# Patient Record
Sex: FEMALE | Race: WHITE | NOT HISPANIC OR LATINO | Employment: UNEMPLOYED | URBAN - METROPOLITAN AREA
[De-identification: names, ages, dates, MRNs, and addresses within clinical notes are randomized per-mention and may not be internally consistent; named-entity substitution may affect disease eponyms.]

---

## 2017-01-24 ENCOUNTER — HOSPITAL ENCOUNTER (OUTPATIENT)
Dept: BONE DENSITY | Facility: CLINIC | Age: 57
End: 2017-01-24
Attending: PHYSICIAN ASSISTANT
Payer: COMMERCIAL

## 2017-01-24 ENCOUNTER — HOSPITAL ENCOUNTER (OUTPATIENT)
Dept: MAMMOGRAPHY | Facility: CLINIC | Age: 57
Discharge: HOME | End: 2017-01-24
Attending: INTERNAL MEDICINE | Admitting: INTERNAL MEDICINE
Payer: COMMERCIAL

## 2017-01-24 DIAGNOSIS — Z12.31 VISIT FOR SCREENING MAMMOGRAM: ICD-10-CM

## 2017-01-24 DIAGNOSIS — N95.1 MENOPAUSAL STATE: ICD-10-CM

## 2017-01-24 PROCEDURE — G0202 SCR MAMMO BI INCL CAD: HCPCS

## 2017-01-24 PROCEDURE — 77080 DXA BONE DENSITY AXIAL: CPT

## 2017-03-02 ENCOUNTER — HOSPITAL ENCOUNTER (EMERGENCY)
Facility: CLINIC | Age: 57
Discharge: HOME | End: 2017-03-02
Attending: EMERGENCY MEDICINE | Admitting: EMERGENCY MEDICINE
Payer: OTHER MISCELLANEOUS

## 2017-03-02 VITALS
TEMPERATURE: 97.8 F | HEART RATE: 65 BPM | DIASTOLIC BLOOD PRESSURE: 91 MMHG | RESPIRATION RATE: 15 BRPM | SYSTOLIC BLOOD PRESSURE: 179 MMHG | WEIGHT: 155 LBS | OXYGEN SATURATION: 99 %

## 2017-03-02 DIAGNOSIS — S61.412A HAND LACERATION, LEFT, INITIAL ENCOUNTER: ICD-10-CM

## 2017-03-02 PROCEDURE — 12001 RPR S/N/AX/GEN/TRNK 2.5CM/<: CPT

## 2017-03-02 PROCEDURE — 99283 EMERGENCY DEPT VISIT LOW MDM: CPT

## 2017-03-02 ASSESSMENT — ENCOUNTER SYMPTOMS: WOUND: 1

## 2017-03-02 NOTE — ED AVS SNAPSHOT
Emergency Department    [address] [facility] [address]    Phone:  [phone]    Fax:  [phone]                                       Nancy Montemayor   MRN: [id number]    Department:   Emergency Department   Date of Visit:  3/2/2017           Patient Information     Date Of Birth          1960        Your diagnoses for this visit were:     Hand laceration, left, initial encounter        You were seen by Dorothy Hale MD.      Follow-up Information     Schedule an appointment as soon as possible for a visit with Anisha Cosme MD, MD.    Specialty:  Internal Medicine    Why:  For suture removal    Contact information:    [facility]  [address] [facility] [address] [id number] [phone]          Follow up with  Emergency Department.    Specialty:  EMERGENCY MEDICINE    Why:  If symptoms worsen    Contact information:    [address] [id number] [phone]        Discharge Instructions       Follow up with primary care provider for suture removal  Keep laceration dry for first 24 hours, then can get it wet but don't soak it  Every day, apply bacitracin and new dressing while at work  At night, can keep it open to air    Discharge Instructions  Laceration (Cut)    You were seen today for a laceration (cut).  Your doctor examined your laceration for any problems such a buried foreign body (like glass, a splinter, or gravel), or injury to blood vessels, tendons, and nerves.  Your doctor may have also rinsed and/or scrubbed your laceration to help prevent an infection.  Your laceration may have been closed with glue, staples or sutures (stitches).      It may not be possible to find all problems with your laceration on the first visit, and we can't always prevent infections.  Antibiotics are only given when the benefit is more than the risk, and don't prevent all infections. Some lacerations are too high risk to close, and are left  open to heal.  All lacerations, no matter how expertly repaired, will cause scarring.    Return to the Emergency Department right away if:    You have more redness, swelling, pain, drainage (pus), a bad smell, or red streaking from your laceration.      You have a fever of 101oF or more.    You have bleeding that you can t stop at home. If your cut starts to bleed, hold pressure on the bleeding area with a clean cloth or put pressure over the bandage.  If the bleeding doesn t stop after using constant pressure for 30 minutes, you should return to the Emergency Department for further treatment.    An area past the laceration is cool, pale, or blue compared with the other side, or has a slower return of color when squeezed.    Your dressing seems too tight or starts to get uncomfortable or painful.    You have loss of normal function or use of an area, such as being unable to straighten or bend a finger normally.    You have a numb area past the laceration.    Return to the Emergency Department or see your regular doctor if:    The laceration starts to come open.     You have something coming out of the cut or a feeling that there is something in the laceration.    Your wound will not heal, or keeps breaking open. There can always be glass, wood, dirt or other things in any wound.  They won t always show up, even on x-rays.  If a wound doesn t heal, this may be why, and it is important to follow-up with your regular doctor.    Home Care:    Take your dressing off in 12 hours, or as instructed by your doctor, to check your laceration. Remove the dressing sooner if it seems too tight or painful, or if it is getting numb, tingly, or pale past the dressing.    Gently wash your laceration 2 times a day with clean cloth and soap.     It is okay to shower, but do not let the laceration soak in water.      If your laceration was closed with wound adhesive or strips: pat it dry and leave it open to the air.     For all other  "repairs: after you wash your laceration, or at least 2 times a day, apply bacitracin or other antibiotic ointment to the laceration, then cover it with a Band-Aid  or gauze.    Keep the laceration clean. Wear gloves or other protective clothing if you are around dirt.    Follow-up:    You need to follow-up with your regular doctor in 7-10 days.    Your sutures or staples need to be removed in 7-10 days. Schedule an appointment with your regular doctor to have this done.    Scars:  To help minimize scarring:    Wear sunscreen over the healed laceration when out in the sun.    Massage the area regularly.    You may use Vitamin E oil.    Wait a year.  Most scars will start to fade within a year.    Probiotics: If you have been given an antibiotic, you may want to also take a probiotic pill or eat yogurt with live cultures. Probiotics have \"good bacteria\" to help your intestines stay healthy. Studies have shown that probiotics help prevent diarrhea and other intestine problems (including C. diff infection) when you take antibiotics. You can buy these without a prescription in the pharmacy section of the store.     If you were given a prescription for medicine here today, be sure to read all of the information (including the package insert) that comes with your prescription.  This will include important information about the medicine, its side effects, and any warnings that you need to know about.  The pharmacist who fills the prescription can provide more information and answer questions you may have about the medicine.  If you have questions or concerns that the pharmacist cannot address, please call or return to the Emergency Department.     Remember that you can always come back to the Emergency Department if you are not able to see your regular doctor in the amount of time listed above, if you get any new symptoms, or if there is anything that worries you.          24 Hour Appointment Hotline       To make an " appointment at any [facility] clinic, call [phone] ([phone]). If you don't have a family doctor or clinic, we will help you find one. [facility] clinics are conveniently located to serve the needs of you and your family.             Review of your medicines      Notice     You have not been prescribed any medications.            Orders Needing Specimen Collection     None      Pending Results     No orders found from 2/28/2017 to 3/3/2017.            Pending Culture Results     No orders found from 2/28/2017 to 3/3/2017.             Test Results from your hospital stay            Clinical Quality Measure: Blood Pressure Screening     Your blood pressure was checked while you were in the emergency department today. The last reading we obtained was  BP: (!) 179/91 . Please read the guidelines below about what these numbers mean and what you should do about them.  If your systolic blood pressure (the top number) is less than 120 and your diastolic blood pressure (the bottom number) is less than 80, then your blood pressure is normal. There is nothing more that you need to do about it.  If your systolic blood pressure (the top number) is 120-139 or your diastolic blood pressure (the bottom number) is 80-89, your blood pressure may be higher than it should be. You should have your blood pressure rechecked within a year by a primary care provider.  If your systolic blood pressure (the top number) is 140 or greater or your diastolic blood pressure (the bottom number) is 90 or greater, you may have high blood pressure. High blood pressure is treatable, but if left untreated over time it can put you at risk for heart attack, stroke, or kidney failure. You should have your blood pressure rechecked by a primary care provider within the next 4 weeks.  If your provider in the emergency department today gave you specific instructions to follow-up with your doctor or provider even sooner than that, you should follow that  "instruction and not wait for up to 4 weeks for your follow-up visit.        Thank you for choosing [facility]       Thank you for choosing [facility] for your care. Our goal is always to provide you with excellent care. Hearing back from our patients is one way we can continue to improve our services. Please take a few minutes to complete the written survey that you may receive in the mail after you visit with us. Thank you!        [facility] [employer] Information     [employer] lets you send messages to your doctor, view your test results, renew your prescriptions, schedule appointments and more. To sign up, go to [email or web address] . Click on \"Log in\" on the left side of the screen, which will take you to the Welcome page. Then click on \"Sign up Now\" on the right side of the page.     You will be asked to enter the access code listed below, as well as some personal information. Please follow the directions to create your username and password.     Your access code is: [email or web address]  Expires: 2017  1:18 PM     Your access code will  in 90 days. If you need help or a new code, please call your [facility] clinic or [phone].        Care EveryWhere ID     This is your Care EveryWhere ID. This could be used by other organizations to access your [facility] medical records  [id number]        After Visit Summary       This is your record. Keep this with you and show to your community pharmacist(s) and doctor(s) at your next visit.                  "

## 2017-03-02 NOTE — DISCHARGE INSTRUCTIONS
Follow up with primary care provider for suture removal  Keep laceration dry for first 24 hours, then can get it wet but don't soak it  Every day, apply bacitracin and new dressing while at work  At night, can keep it open to air    Discharge Instructions  Laceration (Cut)    You were seen today for a laceration (cut).  Your doctor examined your laceration for any problems such a buried foreign body (like glass, a splinter, or gravel), or injury to blood vessels, tendons, and nerves.  Your doctor may have also rinsed and/or scrubbed your laceration to help prevent an infection.  Your laceration may have been closed with glue, staples or sutures (stitches).      It may not be possible to find all problems with your laceration on the first visit, and we can't always prevent infections.  Antibiotics are only given when the benefit is more than the risk, and don't prevent all infections. Some lacerations are too high risk to close, and are left open to heal.  All lacerations, no matter how expertly repaired, will cause scarring.    Return to the Emergency Department right away if:    You have more redness, swelling, pain, drainage (pus), a bad smell, or red streaking from your laceration.      You have a fever of 101oF or more.    You have bleeding that you can t stop at home. If your cut starts to bleed, hold pressure on the bleeding area with a clean cloth or put pressure over the bandage.  If the bleeding doesn t stop after using constant pressure for 30 minutes, you should return to the Emergency Department for further treatment.    An area past the laceration is cool, pale, or blue compared with the other side, or has a slower return of color when squeezed.    Your dressing seems too tight or starts to get uncomfortable or painful.    You have loss of normal function or use of an area, such as being unable to straighten or bend a finger normally.    You have a numb area past the laceration.    Return to the  "Emergency Department or see your regular doctor if:    The laceration starts to come open.     You have something coming out of the cut or a feeling that there is something in the laceration.    Your wound will not heal, or keeps breaking open. There can always be glass, wood, dirt or other things in any wound.  They won t always show up, even on x-rays.  If a wound doesn t heal, this may be why, and it is important to follow-up with your regular doctor.    Home Care:    Take your dressing off in 12 hours, or as instructed by your doctor, to check your laceration. Remove the dressing sooner if it seems too tight or painful, or if it is getting numb, tingly, or pale past the dressing.    Gently wash your laceration 2 times a day with clean cloth and soap.     It is okay to shower, but do not let the laceration soak in water.      If your laceration was closed with wound adhesive or strips: pat it dry and leave it open to the air.     For all other repairs: after you wash your laceration, or at least 2 times a day, apply bacitracin or other antibiotic ointment to the laceration, then cover it with a Band-Aid  or gauze.    Keep the laceration clean. Wear gloves or other protective clothing if you are around dirt.    Follow-up:    You need to follow-up with your regular doctor in 7-10 days.    Your sutures or staples need to be removed in 7-10 days. Schedule an appointment with your regular doctor to have this done.    Scars:  To help minimize scarring:    Wear sunscreen over the healed laceration when out in the sun.    Massage the area regularly.    You may use Vitamin E oil.    Wait a year.  Most scars will start to fade within a year.    Probiotics: If you have been given an antibiotic, you may want to also take a probiotic pill or eat yogurt with live cultures. Probiotics have \"good bacteria\" to help your intestines stay healthy. Studies have shown that probiotics help prevent diarrhea and other intestine problems " (including C. diff infection) when you take antibiotics. You can buy these without a prescription in the pharmacy section of the store.     If you were given a prescription for medicine here today, be sure to read all of the information (including the package insert) that comes with your prescription.  This will include important information about the medicine, its side effects, and any warnings that you need to know about.  The pharmacist who fills the prescription can provide more information and answer questions you may have about the medicine.  If you have questions or concerns that the pharmacist cannot address, please call or return to the Emergency Department.     Remember that you can always come back to the Emergency Department if you are not able to see your regular doctor in the amount of time listed above, if you get any new symptoms, or if there is anything that worries you.

## 2017-03-02 NOTE — ED AVS SNAPSHOT
Emergency Department    [address] [place] [address]    Phone:  [phone]    Fax:  [phone]                                       Nancy Montemayor   MRN: [id number]    Department:   Emergency Department   Date of Visit:  3/2/2017           After Visit Summary Signature Page     I have received my discharge instructions, and my questions have been answered. I have discussed any challenges I see with this plan with the nurse or doctor.    ..........................................................................................................................................  Patient/Patient Representative Signature      ..........................................................................................................................................  Patient Representative Print Name and Relationship to Patient    ..................................................               ................................................  Date                                            Time    ..........................................................................................................................................  Reviewed by Signature/Title    ...................................................              ..............................................  Date                                                            Time

## 2017-03-02 NOTE — ED PROVIDER NOTES
History     Chief Complaint:  Laceration     HPI   Nancy Montemayor is a 56 year old female who presents with a laceration on the palm of her left hand. The patient cut her hand today on a wooden knife block while she was working at [employer]. Of note, the patient is right handed. Tetanus up to date within last 5 years. No numbness/tingling.    Allergies:  The patient has no known drug allergies.    Medications:    The patient is currently on no regular medications.      Past Medical History:    Depressive disorder  Migraines    Past Surgical History:    History reviewed.  No significant past surgical history.     Family History:    History reviewed.  No significant family history.     Social History:  Relationship status:   Tobacco use: Former smoker  Alcohol use: Pos  The patient presents with a friend.     Review of Systems   Skin: Positive for wound.   All other systems reviewed and are negative.    Physical Exam   First Vitals:  BP: (!) 179/91  Pulse: 65  Heart Rate: 65  Temp: 97.8  F (36.6  C)  Resp: 15  Weight: 70.3 kg (155 lb)  SpO2: 99 %    Physical Exam    General: Resting comfortably on the gurney  Eyes:  The pupils are equal and round  ENT:    Atraumatic  Neck:  Normal range of motion  CV:  Regular rate and rhythm    Skin warm and well perfused     Radial pulse 2+ bilaterally     Capillary refill <2 seconds on left hand  Resp:  Non labored breathing  MS:  Normal muscular tone    Moving left hand and left wrist in full ROM  Skin:  2 cm laceration on left palm, minimal bleeding, no visualized vessel, tendon, nerve injury. Superficial  Neuro:   Awake, alert.      Speech is normal and fluent.    Face is symmetric.     Moves all extremities equally    Normal ROM and strength on left hand/wrist    SILT on median/ulnar/radial nerve distribution on left hand  Psych:  Normal affect.  Appropriate interactions.    Emergency Department Course   Procedures:    Narrative: Procedure: Laceration Repair         LACERATION:  A simple clean 2 cm laceration.      LOCATION:  Left palm      FUNCTION:  Distally sensation, circulation, motor and tendon function are intact.      ANESTHESIA:  Local using 0.5% Marcaine total of 4 mLs      PREPARATION:  Irrigation with Normal Saline      DEBRIDEMENT:  no debridement      CLOSURE:  Wound was closed with One Layer.  Skin closed with 3 x 4.0 Ethylon using interrupted sutures.    Emergency Department Course:  Nursing notes and vitals reviewed.  I performed an exam of the patient as documented above.  The above workup was undertaken.  1245: I conducted the procedure as documented above.   1251: I rechecked the patient and discussed results.    Findings and plan explained to the Patient. Patient discharged home, status improved, with instructions regarding supportive care, medications, and reasons to return as well as the importance of close follow-up was reviewed.      Impression & Plan    Medical Decision Making:  Nancy Montemayor is a 56 year old female who presents for evaluation of a laceration to the left palm.  The wound was carefully evaluated and explored.  The laceration was closed with stitches as noted above.  There is no evidence of muscular, tendon, or bony damage with this laceration.  No signs of foreign body.  Possible complications (infection, scarring) were reviewed with the patient.  Follow up with primary care as noted in the discharge section for suture removal in 7-10 days.    Diagnosis:    ICD-10-CM   1. Hand laceration, left, initial encounter S61.412A     Disposition:  discharged to home    Bennett PALOMARES am serving as a scribe on 3/2/2017 at 12:36 PM to personally document services performed by Dorothy Hale MD, based on my observations and the provider's statements to me.   EMERGENCY DEPARTMENT       Dorothy Hale MD  03/02/17 [id number]

## 2018-02-02 ENCOUNTER — HOSPITAL ENCOUNTER (OUTPATIENT)
Dept: MAMMOGRAPHY | Facility: CLINIC | Age: 58
Discharge: HOME | End: 2018-02-02
Attending: INTERNAL MEDICINE | Admitting: INTERNAL MEDICINE
Payer: COMMERCIAL

## 2018-02-02 DIAGNOSIS — Z12.31 VISIT FOR SCREENING MAMMOGRAM: ICD-10-CM

## 2018-02-02 PROCEDURE — 77067 SCR MAMMO BI INCL CAD: CPT

## 2023-03-14 ENCOUNTER — HOSPITAL ENCOUNTER (EMERGENCY)
Facility: CLINIC | Age: 63
Discharge: HOME | End: 2023-03-15
Attending: EMERGENCY MEDICINE | Admitting: EMERGENCY MEDICINE
Payer: COMMERCIAL

## 2023-03-14 DIAGNOSIS — F12.90 MARIJUANA USE: ICD-10-CM

## 2023-03-14 DIAGNOSIS — F33.9 EPISODE OF RECURRENT MAJOR DEPRESSIVE DISORDER, UNSPECIFIED DEPRESSION EPISODE SEVERITY (H): ICD-10-CM

## 2023-03-14 PROCEDURE — 99285 EMERGENCY DEPT VISIT HI MDM: CPT | Mod: 25 | Performed by: EMERGENCY MEDICINE

## 2023-03-14 PROCEDURE — 99285 EMERGENCY DEPT VISIT HI MDM: CPT | Performed by: EMERGENCY MEDICINE

## 2023-03-14 PROCEDURE — 90791 PSYCH DIAGNOSTIC EVALUATION: CPT

## 2023-03-14 RX ORDER — DULOXETIN HYDROCHLORIDE 30 MG/1
30 CAPSULE, DELAYED RELEASE ORAL DAILY
COMMUNITY
End: 2023-03-16 | Stop reason: DRUGHIGH

## 2023-03-14 ASSESSMENT — COLUMBIA-SUICIDE SEVERITY RATING SCALE - C-SSRS
4. HAVE YOU HAD THESE THOUGHTS AND HAD SOME INTENTION OF ACTING ON THEM?: NO
4. HAVE YOU HAD THESE THOUGHTS AND HAD SOME INTENTION OF ACTING ON THEM?: NO
2. HAVE YOU ACTUALLY HAD ANY THOUGHTS OF KILLING YOURSELF?: YES
TOTAL  NUMBER OF ABORTED OR SELF INTERRUPTED ATTEMPTS LIFETIME: NO
6. HAVE YOU EVER DONE ANYTHING, STARTED TO DO ANYTHING, OR PREPARED TO DO ANYTHING TO END YOUR LIFE?: NO
REASONS FOR IDEATION PAST MONTH: MOSTLY TO END OR STOP THE PAIN (YOU COULDN'T GO ON LIVING WITH THE PAIN OR HOW YOU WERE FEELING)
ATTEMPT LIFETIME: NO
1. HAVE YOU WISHED YOU WERE DEAD OR WISHED YOU COULD GO TO SLEEP AND NOT WAKE UP?: YES
2. HAVE YOU ACTUALLY HAD ANY THOUGHTS OF KILLING YOURSELF?: YES
TOTAL  NUMBER OF INTERRUPTED ATTEMPTS LIFETIME: NO
5. HAVE YOU STARTED TO WORK OUT OR WORKED OUT THE DETAILS OF HOW TO KILL YOURSELF? DO YOU INTEND TO CARRY OUT THIS PLAN?: NO
1. IN THE PAST MONTH, HAVE YOU WISHED YOU WERE DEAD OR WISHED YOU COULD GO TO SLEEP AND NOT WAKE UP?: YES
5. HAVE YOU STARTED TO WORK OUT OR WORKED OUT THE DETAILS OF HOW TO KILL YOURSELF? DO YOU INTEND TO CARRY OUT THIS PLAN?: NO
3. HAVE YOU BEEN THINKING ABOUT HOW YOU MIGHT KILL YOURSELF?: YES
REASONS FOR IDEATION LIFETIME: MOSTLY TO END OR STOP THE PAIN (YOU COULDN'T GO ON LIVING WITH THE PAIN OR HOW YOU WERE FEELING)

## 2023-03-14 ASSESSMENT — ACTIVITIES OF DAILY LIVING (ADL): ADLS_ACUITY_SCORE: 35

## 2023-03-15 ENCOUNTER — TELEPHONE (OUTPATIENT)
Dept: BEHAVIORAL HEALTH | Facility: CLINIC | Age: 63
End: 2023-03-15
Payer: COMMERCIAL

## 2023-03-15 VITALS
HEART RATE: 97 BPM | OXYGEN SATURATION: 95 % | WEIGHT: 177 LBS | SYSTOLIC BLOOD PRESSURE: 122 MMHG | DIASTOLIC BLOOD PRESSURE: 90 MMHG | TEMPERATURE: 97.2 F | RESPIRATION RATE: 16 BRPM

## 2023-03-15 NOTE — TELEPHONE ENCOUNTER
Writer spoke with patient and scheduled initial psychiatry appointment for 03/16/2023 @ 10:30 am with Chela Levy. Tracker completed.    Nasreen Galavizrera  03/15/2023  1141    ----- Message from Roberto Carlos Dominguez RN sent at 3/15/2023  8:31 AM CDT -----  Regarding: FW: Medication Management and Therapy   Mental Health &Addiction (MH&A)Transition Clinic (TC):     Provides Patient Support While Waiting to Access Programmatic and Outpatient MH&A Care and Provides Select Crisis Assessment Services     NURSING Referral Review  _________________________________________    This RN has reviewed this Medication Management referral to the Transition Clinic and deemed the referral    Appropriate x   Inappropriate   Consulting     Based on the following criteria:    Pt has a psychiatric provider (or pending plan) in place for future prescribing: Yes:   To: Home with Med Management   Provider(s) Ameena Romero with [facility]   Location [address] [place] [address], [address]   Date/Time 3/22 @ 10am      Timeframe until pt's scheduled psychiatry appointment is less than 6 months: Yes: 7 days     Pt takes psychiatric medications: Yes:   DULoxetine (CYMBALTA) 30 MG capsule Take 30 mg by mouth daily      Pt's goals seem to align with this temporary service: Yes: Transition Clinic to bridge psychiatric care and psychiatric medication management until next Level of Care.         Any additional pertinent information regarding this referral: Patient was seen in the ED on 3/14/23 to 3/15/23. Per ED HPI. Nancy Montemayor is a 62 year old female with hx of fibromyalgia, hypertension, depression who presents with her sister for mental health evaluation. The patient was with friends tonight and she talked about thinking about suicide every day which has been going on for quite awhile.  They felt she should come for evaluation. She says she has been depressed for a long time and had suicidal thoughts for a long time but it seems to have worsened the  past 2 months.  She doesn't like her job. 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  Her parents are elderly and were both in the hospital with influenza in February. Her mother was just placed in a nursing home.  She lives alone. Her adult children live in [place]. She does not talk to her daughter but talks to her son once a week. She hasn't seen him for about 1 year.  She uses thc gummies sometimes and smokes thc about once a day.  She is on cymbalta 60 mg dose for a long period of time via her pmd.  She does not have a therapist or mental health provider. No prior mental health admissions.  She doesn't think she would ever kill herself because of her parents.  She has thought about ways of doing it but denies plan or intent. She finds herself sleeping a lot.  Saw pmd on 3/9/23 and they placed therapy and psychiatry referrals.     Initial contact w/ patient/parent: Transition Clinic RN called and spoke with Nancy JOJO Montemayor who stated that she was interested in Transition Clinic bridging service until her next level of care appointment with Ameena Romero MD. TC Coordinator to contact this patient/patients guardian to schedule a New Person Visit with TC Provider Chela Levy.       Additional Scheduling Instructions for Transition Clinic Coordinator:   TC Coordinators:  This is a Medication management and Therapy  Referral.        Please call the patient at [phone] . Please schedule a NewPerson appointment with TC Provider Chela Levy as soon as possible or as indicated by the patient.       TC RN informe this (patient) as to the purpose and benefit of the TC.      The Transition Clinic is a Temporary Service that helps to bridge the time to your next appointment.  It is not intended to be a long-term service and you are expected to attend your scheduled appointment with your next provider.      Patient/Parent/ Facility Staff Member verbalized understanding x    If you need support  between appointments, please call [phone] and let them know you're seen by [facility] Psychiatry.  You may also send a [facility] message to reach us.        RN Signature Roberto Carlos Dominguez RN on 3/15/2023 at 8:17 AM         FW: Medication Management and Therapy  Received: Today  Nasreen De La Fuente [facility] Rn Mateusz Fraser,     Medication-     Next Level of Care Patient Will Be Transitioned To: Home with Med Management   Provider(s)Ameena Romero with [facility]   Location [address] [place] [address], [address]   Date/Time 3/22 @ 10am       Thank you!         Previous Messages       ----- Message -----   From: Kushal Carreno   Sent: 3/15/2023  12:43 AM CDT   To: Transition [facility]   Subject: Medication Management and Therapy                 Transition Clinic [place]   [place]         Please Check Type of Referral Requested:       __x__THERAPY: The Transition clinic is able to schedule patients without current medical insurance; these patient will be referred to our Social Work Care Coordinator for Medical Insurance              Assistance. We are open for referral for psychotherapy. Patient is referred from:  [facility]       __x__MEDICATION:  Referrals for Medication are ONLY accepted from the following areas (select): Emergency Department/Urgent Care - HIGH PRIORITY                                       Suboxone and Opioid Management Referrals are automatically denied. TC Psychiatry cannot see patient without active medical insurance.   TC Psychiatry cannot accept patient with next level of care scheduled with PCP       Referring Provider Contact Name: Torri Metcalf; Phone Number: [phone]     Reason for Transition Clinic Referral: Medication Management and Therapy. Patient is very worried about not having appointments set up until next week.     Next Level of Care Patient Will Be Transitioned To: Home with Therapy   Provider(s)Luis A Garcia with [facility]    Location [address]   Date/Time 3/21 @ 10am     Next Level of Care Patient Will Be Transitioned To: Home with Med Management   Provider(s)Ameena Romero with [facility]   Location [address], [address]   Date/Time 3/22 @ 10am     What Would Be Helpful from the Transition Clinic: Medication Management and Therapy      Needs: NO     Does Patient Have Access to Technology: Yes     Patient E-mail Address: [email or web address]     Current Patient Phone Number: [phone]     Clinician Gender Preference (if applicable): NO     Patient location preference: [place]                        ----- Message -----  From: Nasreen De La Fuente  Sent: 3/15/2023   7:19 AM CDT  To: [facility] Magdalena Child  Subject: FW: Medication Management and Therapy            Hello,    Medication-    Next Level of Care Patient Will Be Transitioned To: Home with Med Management   Provider(s)Ameena smith [facility]   Location [address], [address]   Date/Time 3/22 @ 10am       Thank you!  ----- Message -----  From: Kushal Carreno  Sent: 3/15/2023  12:43 AM CDT  To: [facility]  Subject: Medication Management and Therapy                Transition Clinic Referral   [place]         Please Check Type of Referral Requested:       __x__THERAPY: The Transition clinic is able to schedule patients without current medical insurance; these patient will be referred to our Social Work Care Coordinator for Medical Insurance              Assistance. We are open for referral for psychotherapy. Patient is referred from:  [facility]      __x__MEDICATION:  Referrals for Medication are ONLY accepted from the following areas (select): Emergency Department/Urgent Care - HIGH PRIORITY                                       Suboxone and Opioid Management Referrals are automatically denied. TC Psychiatry cannot see patient without active medical insurance.   TC Psychiatry  cannot accept patient with next level of care scheduled with PCP      Referring Provider Contact Name: Torri Metcalf; Phone Number: [phone]    Reason for Transition Clinic Referral: Medication Management and Therapy. Patient is very worried about not having appointments set up until next week.     Next Level of Care Patient Will Be Transitioned To: Home with Therapy  Provider(s)Luis A Garcia with [facility]  Location [address]  Date/Time 3/21 @ 10am    Next Level of Care Patient Will Be Transitioned To: Home with Med Management  Provider(s)Ameena Romero with [facility]  Location [address] , [address]  Date/Time 3/22 @ 10am    What Would Be Helpful from the Transition Clinic: Medication Management and Therapy     Needs: NO    Does Patient Have Access to Technology: Yes    Patient E-mail Address: [email or web address]    Current Patient Phone Number: [phone]    Clinician Gender Preference (if applicable): NO    Patient location preference: [place]

## 2023-03-15 NOTE — CONSULTS
Diagnostic Evaluation Consultation  Crisis Assessment    Patient was assessed: In Person  Patient location: [facility] ED  Was a release of information signed: Yes. Providers included on the release: [facility] and [facility]      Referral Data and Chief Complaint  Nancy is a 62 year old, who uses she/her pronouns, and presents to the ED with family/friends. Patient is referred to the ED by family/friends. Patient is presenting to the ED for the following concerns: suicidal ideation.       Informed Consent and Assessment Methods     Patient is her own guardian. Writer met with patient and explained the crisis assessment process, including applicable information disclosures and limits to confidentiality, assessed understanding of the process, and obtained consent to proceed with the assessment. Patient was observed to be able to participate in the assessment as evidenced by being alert, oriented, and engaged. Assessment methods included conducting a formal interview with patient, review of medical records, collaboration with medical staff, and obtaining relevant collateral information from family and community providers when available.     Over the course of this crisis assessment provided reassurance, offered validation, engaged patient in problem solving and disposition planning and worked with patient on safety and aftercare planning. Patient's response to interventions was calm and cooperative.      Summary of Patient Situation     Patient presents to [facility] ED with her sister for evaluation of suicidal ideation. Pt has a hx of depression. The patient was with friends tonight and she talked about thinking about suicide every day which has been going on for quite awhile. She reports her friends encouraged her to come to the ED for evaluation. Pt confided in her sister as well who then accompanied her to the ED this evening. Pt endorses depression and SI for a long time, but this has worsened over the past 2  "months. Pt reports being very unhappy at her current job. She works at [employer] and starts her shift each day at 4 am. Reports it is very stressful and the hours are hard on her. She reports feeling as though she does not have the skill set to find a better job and states \"that is depressing in itself.\" Reports stress about her finances if she were to stop working. She reports sleeping excessively due to these hours, but also due to her depression. She reports withdrawing from others more, not responding as much to others and cancelling plans. She reports her parents are elderly and were both hospitalized with influenza recently. Her mother was recently placed in a nursing home. Her adult children live in [place]. She does not talk to her daughter but talks to her son once a week. She reports she only gets to see him once per year though over the holidays. She endorses using thc gummies occasional and smoking marijuana daily for the past 2 years. She reports she recently started drinking wine and has 1 glass per day. She has a PCP who prescribes her cymbalta 60 mg which she is compliant with. She recently asked for an increase, but her PCP told her that was the max she could do but offered to refer her to a psychiatrist. She does not have any other outpatient providers at this time. No hx of IP MH. Patient endorses SI, but denies plan or intent. States she does not believe she would ever follow through with this and is able to identify protective factors, including her parents. She denies HI, NSSI, hallucinations or delusions.     Brief Psychosocial History     Patient currently lives alone. She has two adult children who live in [place]. She reports she talks to her son once per week, but only sees him in-person once per year when he comes home for Thanksgiving. She has a daughter who does not talk to her. She reports she was  for 32 years and  her partner about 6 years ago and her " daughter was very upset about this and has not talked to her since. Pt currently works at [employer] and reports it is very stressful and takes a significant toll on her mental health. She is not happy with her job. She reports stress about finances if she were to quit though. She also feels that she does not have the skill set to find a different, better job. She is considering taking a leave of absence to focus on her mental kassi though. No relevant legal issues noted or reported.     Significant Clinical History    Patient has a hx of depression. She reports a hx of marijuana use, and she went to [facility] treatment at [facility] for this when she was 25 years old. She reports she started smoking marijuana again about two years ago daily and occasionally takes THC gummies. She reports a hx of seeing individual and marriage therapists in the past, but does not have one currently. No hx of MH day treatment programming. No hx of IP MH. No hx of commitment. Currently has a PCP who prescribes her cymbalta 60 mg, which she is compliant with. Denies any relevant trauma hx. No other providers at this time.      Collateral Information    Kavita Perry (sister) [phone] - present at the time of assessment. Supportive and able to engage in safety planning along with patient. Reports it was patient's friends that she opened up to and pt called her afterwards. Reports she is aware pt has struggled with her mental health in the past, especially during her marriage and divorce. Reports she was very unhappy in her marriage. Has offered to have patient stay with her for additional safety and support.      Risk Assessment    [place] Suicide Severity Rating Scale Full Clinical Version: moderate risk 3/14/23  Suicidal Ideation  1. Wish to be Dead (Lifetime): Yes  1. Wish to be Dead (Past 1 Month): Yes  2. Non-Specific Active Suicidal Thoughts (Lifetime): Yes  2. Non-Specific Active Suicidal Thoughts (Past 1 Month): Yes  3. Active  Suicidal Ideation with any Methods (Not Plan) Without Intent to Act (Lifetime): Yes  3. Active Suicidal Ideation with any Methods (Not Plan) Without Intent to Act (Past 1 Month): Yes  4. Active Suicidal Ideation with Some Intent to Act, Without Specific Plan (Lifetime): No  4. Active Suicidal Ideation with Some Intent to Act, Without Specific Plan (Past 1 Month): No  5. Active Suicidal Ideation with Specific Plan and Intent (Lifetime): No  5. Active Suicidal Ideation with Specific Plan and Intent (Past 1 Month): No  Intensity of Ideation  Most Severe Ideation Rating (Lifetime): 4  Most Severe Ideation Rating (Past 1 Month): 4  Frequency (Lifetime): Many times each day  Frequency (Past 1 Month): Many times each day  Duration (Lifetime): 1-4 hours/a lot of time  Duration (Past 1 Month): 1-4 hours/a lot of time  Controllability (Lifetime): Can control thoughts with little difficulty  Controllability (Past 1 Month): Can control thoughts with little difficulty  Deterrents (Lifetime): Deterrents definitely stopped you from attempting suicide  Deterrents (Past 1 Month): Deterrents definitely stopped you from attempting suicide  Reasons for Ideation (Lifetime): Mostly to end or stop the pain (You couldn't go on living with the pain or how you were feeling)  Reasons for Ideation (Past 1 Month): Mostly to end or stop the pain (You couldn't go on living with the pain or how you were feeling)  Suicidal Behavior  Actual Attempt (Lifetime): No  Has subject engaged in non-suicidal self-injurious behavior? (Lifetime): No  Interrupted Attempts (Lifetime): No  Aborted or Self-Interrupted Attempt (Lifetime): No  Preparatory Acts or Behavior (Lifetime): No  C-SSRS Risk (Lifetime/Recent)  Calculated C-SSRS Risk Score (Lifetime/Recent): Moderate Risk      Validity of evaluation is not impacted by presenting factors during interview.   Comments regarding subjective versus objective responses to [place] tool: none. See clinical narrative.    Environmental or Psychosocial Events: loss of relationship due to divorce/separation, challenging interpersonal relationships, helplessness/hopelessness, other life stressors and other: stressful job  Chronic Risk Factors: none identified  Warning Signs: talking or writing about death, dying, or suicide and withdrawing from friends, family, and society  Protective Factors: strong bond to family unit, community support, or employment, responsibilities and duties to others, including pets and children, lives in a responsibly safe and stable environment, supportive ongoing medical and mental health care relationships, help seeking and sense of belonging  Interpretation of Risk Scoring, Risk Mitigation Interventions and Safety Plan:  Pt is assessed to be of moderate risk of harm to herself based on the [place] screening. Pt endorses ongoing SI. She denies any plan or intent at this time though and reports she would never follow through with this and reports her parents as a protective factor. No hx of attempts. Able to engage in safety planning.     Does the patient have thoughts of harming others? No     Is the patient engaging in sexually inappropriate behavior?  no        Current Substance Abuse     Is there recent substance abuse? marijuana, THC edibles daily for past two years. 1 glass of wine per night.      Was a urine drug screen or blood alcohol level obtained: No       Mental Status Exam     Affect: Appropriate   Appearance: Appropriate    Attention Span/Concentration: Attentive  Eye Contact: Engaged   Fund of Knowledge: Appropriate    Language /Speech Content: Fluent   Language /Speech Volume: Normal    Language /Speech Rate/Productions: Normal    Recent Memory: Intact   Remote Memory: Intact   Mood: Normal    Orientation to Person: Yes    Orientation to Place: Yes   Orientation to Time of Day: Yes    Orientation to Date: Yes    Situation (Do they understand why they are here?): Yes    Psychomotor  Behavior: Normal    Thought Content: Clear   Thought Form: Intact      History of commitment: No    Medication    Psychotropic medications: Yes. Pt is currently taking cymbalta 60 mg. Medication compliant: Yes. Recent medication changes: No    Current Care Team    Primary Care Provider: Anisha Cosme MD - [facility], [facility]  [address]    [place] [id number]    Phone: [phone]    Fax: [phone]  Psychiatrist: No  Therapist: No  : No     CTSS or ARMHS: No  ACT Team: No  Other: No      Diagnosis    1 Major depressive disorder, Recurrent episode, Unspecified F33.9 - Primary, By History   Clinical Summary and Substantiation of Recommendations    After therapeutic assessment, intervention and aftercare planning by ED care team and LM and in consultation with attending provider, the patient's circumstances and mental state were appropriate for outpatient management. It is the recommendation of this clinician that pt discharge with OP MH support. A this time the pt is not presenting as an acute risk to self or others due to the following factors: pt endorses SI, but denies any plan or intent. Denies HI, NSSI, hallucinations or delusions. Pt is able to engage in safety planning and involved her sister, who is present at the time of assessment, in that safety plan. She plans to take a leave of absence from work to focus on her mental health. She is agreeable to recommendations for programmatic care, medication management and individual therapy. She feels safe discharging home to her sister's care with these appointments.    Disposition    Recommended disposition: Individual Therapy, Medication Management and Programmatic Care: Adult Day Treatment       Reviewed case and recommendations with attending provider. Attending Name: Dr. Sanchez       Attending concurs with disposition: Yes       Patient and/or validated legal guardian concurs with  disposition: Yes       Final disposition: Individual therapy , Medication management and Programmatic care: Adult Day Treatment.     Outpatient Details (if applicable):   Aftercare plan and appointments placed in the AVS and provided to patient: Yes. Given to patient by ED Nursing Staff    Assessment Details    Patient interview started at: 10:54 pm and completed at: 11:25 pm.     Total duration spent on the patient case in minutes: .50 hrs      CPT code(s) utilized: 20752 - Psychotherapy for Crisis - 60 (30-74*) min       JAYDEN Armendariz, Psychotherapist  [facility] - Triage & Transition Services  Callback: [phone]      Aftercare Plan     Mental Health Resources and Appointments     Intake Assessment - Virtual   A diagnostic assessment has been scheduled for the [facility] on the date of March 16th at 1:30pm.  This appointment will be conducted virtually. The [facility] has been given your contact information; They will provide you with appointment login information via e-mail and call you 15-30 minutes prior to your appointment to confirm you have the correct login information.      If you need to reschedule or cancel this appointment, please call Behavioral Access at [phone]. If you must cancel, please call at least 24 hours prior to your scheduled appointment. Please note that this assessment is needed to determine eligibility for Dialogic Programming. If you are determined to not be an appropriate fit for programming, please feel free to call [facility] at [phone] to speak with one of our coordinators on other programming options.      Type: Therapy - Initial (In-Person)  Date: Tuesday, 3/21/2023    Time: 10:00 am - 11:00 am  Provider: Luis A Garcia  Location: [facility], [facility] [place] [facility], [address] [place] [id number]  Phone: [phone]  This clinic will reach out to you and provide more information. If  you don't hear from them within 48 hours, please call the number listed above.      Type: Medication Mgmt - Initial (In-Person)  Date: Wednesday, 3/22/2023    Time: 10:00 am - 11:00 am  Provider: Ameena Romero MD, MD  Location: [facility], [address] , [address], [place] [address]  Phone: [phone]  Patient Instructions  THIS IS ONLY A RESERVATION. PATIENT MUST CALL [phone] TO PRE-REGISTER AND CONFIRM APPOINTMENT. Please arrive 15 min early with ID and insurance card. Insurance accepted: [place], PMAPs, commercial insurance. If pt has no insurance, we have a [facility] Navigator available to assist in applying for state insurance. We have 3 locations - intake appts available in [place] or [place], depending on day/time of week. Please call [phone] to verify location and pre-register to confirm appt.     Provider referred patient for Transition Clinic services through in basket message. The transition clinic will reach out directly to you.     If I am feeling unsafe or I am in a crisis, I will:   Contact my established care providers   Call the National Suicide Prevention Lifeline: 988  Go to the nearest emergency room   Call 911      Warning signs that I or other people might notice when a crisis is developing for me: increased thoughts or plans to harm myself or others, feeling unable to keep myself safe, symptoms do not improve or worsen     Things I am able to do on my own or with others to cope or help me feel better: listen to music, watch favorite movie or show, talk to friends or sister, yoga, meditation, coping skills listed below, go for a walk     Changes I can make to support my mental health and wellness: adhere to treatment recommendations, continue taking medications as prescribed, follow up with scheduled outpatient appointments, follow up with programmatic care      People in my life that I can ask for help: friends, sister, loved ones, outpatient providers - including  "newly scheduled providers above     Your county has a mental health crisis team you can call 24/7: [facility] Mobile Crisis  [phone]      Crisis Lines  Crisis Text Line  Text 053070  You will be connected with a trained live crisis counselor to provide support.     Por aylin, texto  EDUARDO a 099868 o texto a 442-AYUDAME en WhatsApp     The Jm Project (LGBTQ Youth Crisis Line)  4.606.565.9892  text START to 297-543        [employer]  Fast Tracker  Linking people to mental health and substance use disorder resources  [employer]      [place] Mental Health Warm Line  Peer to peer support  Monday thru Saturday, 12 pm to 10 pm  941.492.8108 or 9.222.717.1540  Text \"Support\" to 86183     National [place] on Mental Illness (KAN)  794.713.7892 or 1.888.KAN.HELPS        Mental Health Apps  My3  https://Cloud Content.org/     VirtualHopeBox  https://Hithru.org/apps/virtual-hope-box/        Additional Information  Today you were seen by a licensed mental health professional through Triage and Transition services, [facility] ([facility])  for a crisis assessment in the Emergency Department at [facility].  It is recommended that you follow up with your established providers (psychiatrist, mental health therapist, and/or primary care doctor - as relevant) as soon as possible. Coordinators from [facility] will be calling you in the next 24-48 hours to ensure that you have the resources you need.  You can also contact [facility] coordinators directly at [phone]. You may have been scheduled for or offered an appointment with a mental health provider. [facility] maintains an extensive network of licensed behavioral health providers to connect patients with the services they need.  We do not charge providers a fee to participate in our referral network.  We match patients with providers based on a patient's specific needs, insurance coverage, and location.  Our first effort will be " to refer you to a provider within your care system, and will utilize providers outside your care system as needed.       Grounding Techniques:    Try to notice where you are, your surroundings including the people, the sounds like the TV or radio.    Concentrate on your breathing. Take a deep cleansing breath from your diaphragm. Count the breaths as you exhale. Make sure you breath slowly.    Hold something that you find comforting, for some it may be a stuffed animal or a blanket. Notice how it feels in your hands. Is it hard or soft?    During a non-crisis time make a list of positive affirmations. Print them out and keep them handy for times of intense anxiety. At those times, read them aloud.  Try the Orchard Platform game:    Name 5 things you can see in the room with you    Name 4 things you can feel ( chair on my back  or  feet on floor )     Name 3 things you can hear right now ( people talking  or  tv )     Name 2 things you can smell right now (or, 2 things you like the smell of)     Name 1 good thing about yourself  Create A Safe Place    Image a safe place -- it can be a real or imaginary place:     What do you see -- especially colors?     What sounds do you hear?     What sensations do you feel?     What smells do you smell?     What people or animals would you want in your safe place?     Imagine a protective bubble, wall or boundary around your safe place.     Imagine a door or gate with a guard at your safe place.     Image a lock and key to your safe place and only you can unlock it.    You can draw or make a collage that represents your safe place.     Choose a souvenir of your safe place -- a color, an object, a song.     Keep your image of your safe place so you can come back to it when you need to

## 2023-03-15 NOTE — ED PROVIDER NOTES
ED Provider Note  [facility]      History     Chief Complaint   Patient presents with     Suicidal     Patient reports suicidal thoughts, making plans. Feeling these thoughts on and off for months.      HPI  Nancy Montemayor is a 62 year old female with hx of fibromyalgia, hypertension, depression who presents with her sister for mental health evaluation. The patient was with friends tonight and she talked about thinking about suicide every day which has been going on for quite awhile.  They felt she should come for evaluation. She says she has been depressed for a long time and had suicidal thoughts for a long time but it seems to have worsened the past 2 months.  She doesn't like her job. 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  Her parents are elderly and were both in the hospital with influenza in February. Her mother was just placed in a nursing home.  She lives alone. Her adult children live in [place]. She does not talk to her daughter but talks to her son once a week. She hasn't seen him for about 1 year.  She uses thc gummies sometimes and smokes thc about once a day.  She is on cymbalta 60 mg dose for a long period of time via her pmd.  She does not have a therapist or mental health provider. No prior mental health admissions.  She doesn't think she would ever kill herself because of her parents.  She has thought about ways of doing it but denies plan or intent. She finds herself sleeping a lot.     Saw pmd on 3/9/23 and they placed therapy and psychiatry referrals.     Past Medical History  Past Medical History:   Diagnosis Date     Depressive disorder      Migraines      History reviewed. No pertinent surgical history.  DULoxetine (CYMBALTA) 30 MG capsule      No Known Allergies  Family History  History reviewed. No pertinent family history.  Social History   Social History     Tobacco Use     Smoking status: Former     Smokeless tobacco: Never   Substance  Use Topics     Alcohol use: Yes     Comment: 1-2 glasses of wine at night     Drug use: Yes     Types: Marijuana      Past medical history, past surgical history, medications, allergies, family history, and social history were reviewed with the patient. No additional pertinent items.      A complete review of systems was performed with pertinent positives and negatives noted in the HPI, and all other systems negative.    Physical Exam   BP: (!) 160/97  Pulse: 95  Temp: 97.2  F (36.2  C)  Resp: 16  Weight: 80.3 kg (177 lb)  SpO2: 97 %  Physical Exam  Vitals and nursing note reviewed.   Constitutional:       General: She is not in acute distress.     Appearance: She is not ill-appearing, toxic-appearing or diaphoretic.   HENT:      Head: Normocephalic and atraumatic.      Right Ear: External ear normal.      Left Ear: External ear normal.      Nose: No congestion or rhinorrhea.   Eyes:      General: No scleral icterus.     Extraocular Movements: Extraocular movements intact.   Cardiovascular:      Rate and Rhythm: Normal rate.   Pulmonary:      Effort: Pulmonary effort is normal.   Musculoskeletal:         General: No deformity or signs of injury.      Cervical back: Normal range of motion.   Skin:     Coloration: Skin is not jaundiced.   Neurological:      General: No focal deficit present.      Mental Status: She is alert and oriented to person, place, and time.   Psychiatric:         Attention and Perception: Attention and perception normal.         Mood and Affect: Mood is depressed.         Speech: Speech normal.         Behavior: Behavior normal. Behavior is cooperative.         Thought Content: Thought content is not paranoid or delusional. Thought content includes suicidal (chronic) ideation. Thought content does not include homicidal ideation. Thought content does not include homicidal or suicidal plan.         Cognition and Memory: Cognition and memory normal.         Judgment: Judgment normal.           ED  Course, Procedures, & Data      Procedures         Mental Health Risk Assessment      PSS-3    Date and Time Over the past 2 weeks have you felt down, depressed, or hopeless? Over the past 2 weeks have you had thoughts of killing yourself? Have you ever attempted to kill yourself? When did this last happen? User   03/14/23 2130 yes yes no -- [facility]      C-SSRS ([place])    Date and Time Q1 Wished to be Dead (Past Month) Q2 Suicidal Thoughts (Past Month) Q3 Suicidal Thought Method Q4 Suicidal Intent without Specific Plan Q5 Suicide Intent with Specific Plan Q6 Suicide Behavior (Lifetime) Within the Past 3 Months? RETIRED: Level of Risk per Screen Screening Not Complete User   03/14/23 2130 yes yes yes no yes no -- -- -- [facility]              Suicide assessment completed by mental health (D.E.C., LCSW, etc.)       No results found for any visits on 03/14/23.  Medications - No data to display  Labs Ordered and Resulted from Time of ED Arrival to Time of ED Departure - No data to display  No orders to display          Critical care was not performed.     Medical Decision Making  The patient's presentation was of high complexity (an acute health issue posing potential threat to life or bodily function).    The patient's evaluation involved:  an assessment requiring an independent historian (sister present and involved with discussion)  discussion of management or test interpretation with another health professional (dec )    The patient's management necessitated high risk (a decision regarding hospitalization).      Assessment & Plan    Nancy Montemayor is a 62 year old female with hx of fibromyalgia and depression who presents with her sister for mental health evaluation  She has chronic suicidal thoughts.  Her depression and suicidal thoughts have worsened the past 2 months.  She thinks of ways to kill herself but says she would never do it because of her elderly parents.  She denies plan or intent.  She was seen by  myself and the DEC  and we discussed her case.  The patient denies suicidal plan, active suicidal ideation or intent.  She is able to safety plan and can identify parents as reason why she would not kill herself. She was discharged with referral to PHP, therapy and medication provider appointments.     I have reviewed the nursing notes. I have reviewed the findings, diagnosis, plan and need for follow up with the patient.    New Prescriptions    No medications on file       Final diagnoses:   Episode of recurrent major depressive disorder, unspecified depression episode severity (H)       Wanda Sanchez MD  [facility] EMERGENCY DEPARTMENT  3/14/2023     Wanda Sanchez MD  03/16/23 1020

## 2023-03-15 NOTE — TELEPHONE ENCOUNTER
Writer has fwd medication management referral only to TC RN pool as next level of care set and replied to referral source. Will wait to hear if referral is appropriate or inappropriate.Therapy and psychiatry first attempt.    Nasreen Galavizrera  03/15/2023  720    ----- Message from Kushal Carreno sent at 3/15/2023 12:38 AM CDT -----  Regarding: Medication Management and Therapy  Transition Clinic Referral   [place]         Please Check Type of Referral Requested:       __x__THERAPY: The Transition clinic is able to schedule patients without current medical insurance; these patient will be referred to our Social Work Care Coordinator for Medical Insurance              Assistance. We are open for referral for psychotherapy. Patient is referred from:  [facility]      __x__MEDICATION:  Referrals for Medication are ONLY accepted from the following areas (select): Emergency Department/Urgent Care - HIGH PRIORITY                                       Suboxone and Opioid Management Referrals are automatically denied. TC Psychiatry cannot see patient without active medical insurance.   TC Psychiatry cannot accept patient with next level of care scheduled with PCP      Referring Provider Contact Name: Torri Metcalf; Phone Number: [phone]    Reason for Transition Clinic Referral: Medication Management and Therapy. Patient is very worried about not having appointments set up until next week.     Next Level of Care Patient Will Be Transitioned To: Home with Therapy  Provider(s)Luis A Garcia with [facility]  Location [address]  Date/Time 3/21 @ 10am    Next Level of Care Patient Will Be Transitioned To: Home with Med Management  Provider(s)Ameena Romero with [facility]  Location [address], [address]  Date/Time 3/22 @ 10am    What Would Be Helpful from the Transition Clinic: Medication Management and Therapy     Needs: NO    Does Patient Have Access  to Technology: Yes    Patient E-mail Address: [email or web address]    Current Patient Phone Number: [phone]    Clinician Gender Preference (if applicable): NO    Patient location preference: [place]

## 2023-03-15 NOTE — TELEPHONE ENCOUNTER
First attempt to contact pt. Writer left a VM with TC contact info and encouraged a phone call back to schedule initial therapy and psychiatry appointment. Writer will postpone for tomorrow.    Nasreen De La Fuente  03/15/2023  926    ----- Message from Roberto Carlos Dominguez RN sent at 3/15/2023  8:31 AM CDT -----  Regarding: FW: Medication Management and Therapy   Mental Health &Addiction (MH&A)Transition Clinic (TC):     Provides Patient Support While Waiting to Access Programmatic and Outpatient MH&A Care and Provides Select Crisis Assessment Services     NURSING Referral Review  _________________________________________    This RN has reviewed this Medication Management referral to the Transition Clinic and deemed the referral    Appropriate x   Inappropriate   Consulting     Based on the following criteria:    Pt has a psychiatric provider (or pending plan) in place for future prescribing: Yes:   To: Home with Med Management   Provider(s) Ameena Romero with [facility]   Location [address] [place] [address], [address]   Date/Time 3/22 @ 10am      Timeframe until pt's scheduled psychiatry appointment is less than 6 months: Yes: 7 days     Pt takes psychiatric medications: Yes:   DULoxetine (CYMBALTA) 30 MG capsule Take 30 mg by mouth daily      Pt's goals seem to align with this temporary service: Yes: Transition Clinic to bridge psychiatric care and psychiatric medication management until next Level of Care.         Any additional pertinent information regarding this referral: Patient was seen in the ED on 3/14/23 to 3/15/23. Per ED HPI. Nancy Montemayor is a 62 year old female with hx of fibromyalgia, hypertension, depression who presents with her sister for mental health evaluation. The patient was with friends tonight and she talked about thinking about suicide every day which has been going on for quite awhile.  They felt she should come for evaluation. She says she has been depressed for a long time and had suicidal thoughts  for a long time but it seems to have worsened the past 2 months.  She doesn't like her job. 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  Her parents are elderly and were both in the hospital with influenza in February. Her mother was just placed in a nursing home.  She lives alone. Her adult children live in [place]. She does not talk to her daughter but talks to her son once a week. She hasn't seen him for about 1 year.  She uses thc gummies sometimes and smokes thc about once a day.  She is on cymbalta 60 mg dose for a long period of time via her pmd.  She does not have a therapist or mental health provider. No prior mental health admissions.  She doesn't think she would ever kill herself because of her parents.  She has thought about ways of doing it but denies plan or intent. She finds herself sleeping a lot.  Saw pmd on 3/9/23 and they placed therapy and psychiatry referrals.     Initial contact w/ patient/parent: Transition Clinic RN called and spoke with Nancy Montemayor who stated that she was interested in Transition Clinic bridging service until her next level of care appointment with Ameena Romero MD. TC Coordinator to contact this patient/patients guardian to schedule a New Person Visit with TC Provider Chela Levy.       Additional Scheduling Instructions for Transition Clinic Coordinator:   TC Coordinators:  This is a Medication management and Therapy  Referral.        Please call the patient at [phone] . Please schedule a NewPerson appointment with TC Provider Chela Levy as soon as possible or as indicated by the patient.       TC RN informe this (patient) as to the purpose and benefit of the TC.      The Transition Clinic is a Temporary Service that helps to bridge the time to your next appointment.  It is not intended to be a long-term service and you are expected to attend your scheduled appointment with your next provider.      Patient/Parent/ Facility Staff Member  verbalized understanding x    If you need support between appointments, please call [phone] and let them know you're seen by Transition Clinic Psychiatry.  You may also send a Vee[employer] message to reach us.        RN Signature Roberto Carlos Dominguez RN on 3/15/2023 at 8:17 AM         FW: Medication Management and Therapy  Received: Today  Nasreen De La Fuente [facility] Rn Mateusz Fraser     Medication-     Next Level of Care Patient Will Be Transitioned To: Home with Med Management   Provider(s)Ameena Romero with [facility]   Location [address] [place] [address], [address]   Date/Time 3/22 @ 10am       Thank you!         Previous Messages       ----- Message -----   From: Kushal Carreno   Sent: 3/15/2023  12:43 AM CDT   To: Transition Clinic   Subject: Medication Management and Therapy                 Transition Clinic Referral   [place]         Please Check Type of Referral Requested:       __x__THERAPY: The Transition clinic is able to schedule patients without current medical insurance; these patient will be referred to our Social Work Care Coordinator for Medical Insurance              Assistance. We are open for referral for psychotherapy. Patient is referred from:  [facility]       __x__MEDICATION:  Referrals for Medication are ONLY accepted from the following areas (select): Emergency Department/Urgent Care - HIGH PRIORITY                                       Suboxone and Opioid Management Referrals are automatically denied. TC Psychiatry cannot see patient without active medical insurance.   TC Psychiatry cannot accept patient with next level of care scheduled with PCP       Referring Provider Contact Name: Torri Metcalf; Phone Number: [phone]     Reason for Transition Clinic Referral: Medication Management and Therapy. Patient is very worried about not having appointments set up until next week.     Next Level of Care Patient Will Be Transitioned To: Home with Therapy    Provider(s)Luis A Garcia with [facility]   Location [address] [facility] [address]   Date/Time 3/21 @ 10am     Next Level of Care Patient Will Be Transitioned To: Home with Med Management   Provider(s)Ameena Romero with [facility]   Location [address] , [address]   Date/Time 3/22 @ 10am     What Would Be Helpful from the Transition Clinic: Medication Management and Therapy      Needs: NO     Does Patient Have Access to Technology: Yes     Patient E-mail Address: [email or web address]     Current Patient Phone Number: [phone]     Clinician Gender Preference (if applicable): NO     Patient location preference: [place]                        ----- Message -----  From: Nasreen De La Fuente  Sent: 3/15/2023   7:19 AM CDT  To: Transition Clinic Magdalena Child  Subject: FW: Medication Management and Therapy            Hello,    Medication-    Next Level of Care Patient Will Be Transitioned To: Home with Med Management   Provider(s)Ameena smith [facility]   Location [address]   Date/Time 3/22 @ 10am       Thank you!  ----- Message -----  From: Kushal Carreno  Sent: 3/15/2023  12:43 AM CDT  To: Transition Clinic  Subject: Medication Management and Therapy                Transition Clinic Referral   [place]         Please Check Type of Referral Requested:       __x__THERAPY: The Transition clinic is able to schedule patients without current medical insurance; these patient will be referred to our Social Work Care Coordinator for Medical Insurance              Assistance. We are open for referral for psychotherapy. Patient is referred from:  [facility]      __x__MEDICATION:  Referrals for Medication are ONLY accepted from the following areas (select): Emergency Department/Urgent Care - HIGH PRIORITY                                       Suboxone and Opioid Management Referrals are automatically denied. TC Psychiatry cannot see  patient without active medical insurance.    Psychiatry cannot accept patient with next level of care scheduled with PCP      Referring Provider Contact Name: Torri Metcalf; Phone Number: [phone]    Reason for Transition Clinic Referral: Medication Management and Therapy. Patient is very worried about not having appointments set up until next week.     Next Level of Care Patient Will Be Transitioned To: Home with Therapy  Provider(s)Luis A Garcia with [facility]  Location [address] [facility] [address]  Date/Time 3/21 @ 10am    Next Level of Care Patient Will Be Transitioned To: Home with Med Management  Provider(s)Ameena Romero with [facility]  Location [address] , [address]  Date/Time 3/22 @ 10am    What Would Be Helpful from the Transition Clinic: Medication Management and Therapy     Needs: NO    Does Patient Have Access to Technology: Yes    Patient E-mail Address: [email or web address]    Current Patient Phone Number: [phone]    Clinician Gender Preference (if applicable): NO    Patient location preference: [place]

## 2023-03-15 NOTE — DISCHARGE INSTRUCTIONS
Aftercare Plan    Mental Health Resources and Appointments    Intake Assessment - Virtual   A diagnostic assessment has been scheduled for the [facility] on the date of March 16th at 1:30pm.  This appointment will be conducted virtually. The [facility] has been given your contact information; They will provide you with appointment login information via e-mail and call you 15-30 minutes prior to your appointment to confirm you have the correct login information.     If you need to reschedule or cancel this appointment, please call [facility] at [phone]. If you must cancel, please call at least 24 hours prior to your scheduled appointment. Please note that this assessment is needed to determine eligibility for [facility]. If you are determined to not be an appropriate fit for programming, please feel free to call [facility] at [phone] to speak with one of our coordinators on other programming options.     Type: Therapy - Initial (In-Person)  Date: Tuesday, 3/21/2023    Time: 10:00 am - 11:00 am  Provider: Luis A Garcia  Location: [facility], [address] [place] [address]  Phone: [phone]  This clinic will reach out to you and provide more information. If you don't hear from them within 48 hours, please call the number listed above.     Type: Medication Mgmt - Initial (In-Person)  Date: Wednesday, 3/22/2023    Time: 10:00 am - 11:00 am  Provider: Ameena Romero MD, MD  [facility], [address] [place] [address], [address], [place] [address]  Phone: [phone]  Patient Instructions  THIS IS ONLY A RESERVATION. PATIENT MUST CALL [phone] TO PRE-REGISTER AND CONFIRM APPOINTMENT. Please arrive 15 min early with ID and insurance card. Insurance accepted: Dylan [place]STON, commercial insurance. If pt has no insurance, we have a [facility] Navigator available to assist in applying  for state insurance. We have 3 locations - intake appts available in [place] or [place], depending on day/time of week. Please call [phone] to verify location and pre-register to confirm appt.    Provider referred patient for Transition Clinic services through in basket message. The transition clinic will reach out directly to you.    If I am feeling unsafe or I am in a crisis, I will:   Contact my established care providers   Call the National Suicide Prevention Lifeline: 988  Go to the nearest emergency room   Call 911     Warning signs that I or other people might notice when a crisis is developing for me: increased thoughts or plans to harm myself or others, feeling unable to keep myself safe, symptoms do not improve or worsen    Things I am able to do on my own or with others to cope or help me feel better: listen to music, watch favorite movie or show, talk to friends or sister, yoga, meditation, coping skills listed below, go for a walk    Changes I can make to support my mental health and wellness: adhere to treatment recommendations, continue taking medications as prescribed, follow up with scheduled outpatient appointments, follow up with programmatic care     People in my life that I can ask for help: friends, sister, loved ones, outpatient providers - including newly scheduled providers above    Your county has a mental health crisis team you can call 24/7: [facility] Mobile Crisis  [phone]     Crisis Lines  Crisis Text Line  Text 063935  You will be connected with a trained live crisis counselor to provide support.    Por aylin, texto  EDUARDO a 066935 o texto a 442-AYUDAME en WhatsApp    The Jm Project (LGBTQ Youth Crisis Line)  7.349.922.3364  text START to 194-471      Community Resources  Fast Tracker  Linking people to mental health and substance use disorder resources  fastEadBoxckermn.org     [place] Mental Health Warm Line  Peer to peer support  Monday thru  "Saturday, 12 pm to 10 pm  819.586.6114 or 5.217.314.7966  Text \"Support\" to 23277    National [place] on Mental Illness (KAN)  994.281.9755 or 1.888.KAN.HELPS      Mental Health Apps  My3  https://Bonfaire.org/    VirtualHopeBox  https://SigFig/apps/virtual-hope-box/      Additional Information  Today you were seen by a licensed mental health professional through Triage and Transition services, [facility] ([facility])  for a crisis assessment in the Emergency Department at [facility].  It is recommended that you follow up with your established providers (psychiatrist, mental health therapist, and/or primary care doctor - as relevant) as soon as possible. Coordinators from [facility] will be calling you in the next 24-48 hours to ensure that you have the resources you need.  You can also contact [facility] coordinators directly at [phone]. You may have been scheduled for or offered an appointment with a mental health provider. [facility] maintains an extensive network of licensed behavioral health providers to connect patients with the services they need.  We do not charge providers a fee to participate in our referral network.  We match patients with providers based on a patient's specific needs, insurance coverage, and location.  Our first effort will be to refer you to a provider within your care system, and will utilize providers outside your care system as needed.      Grounding Techniques:  Try to notice where you are, your surroundings including the people, the sounds like the TV or radio.  Concentrate on your breathing. Take a deep cleansing breath from your diaphragm. Count the breaths as you exhale. Make sure you breath slowly.  Hold something that you find comforting, for some it may be a stuffed animal or a blanket. Notice how it feels in your hands. Is it hard or soft?  During a non-crisis time make a list of positive affirmations. Print them out and keep them handy for times of " intense anxiety. At those times, read them aloud.  Try the Crispy Driven Pixels game:  Name 5 things you can see in the room with you  Name 4 things you can feel ( chair on my back  or  feet on floor )   Name 3 things you can hear right now ( people talking  or  tv )   Name 2 things you can smell right now (or, 2 things you like the smell of)   Name 1 good thing about yourself  Create A Safe Place  Image a safe place -- it can be a real or imaginary place:   What do you see -- especially colors?   What sounds do you hear?   What sensations do you feel?   What smells do you smell?   What people or animals would you want in your safe place?   Imagine a protective bubble, wall or boundary around your safe place.   Imagine a door or gate with a guard at your safe place.   Image a lock and key to your safe place and only you can unlock it.  You can draw or make a collage that represents your safe place.   Choose a souvenir of your safe place -- a color, an object, a song.   Keep your image of your safe place so you can come back to it when you need to.

## 2023-03-15 NOTE — ED NOTES
This writer called pt's emergency contact/sister Kavita Perry at [phone] and left a voicemail requesting a call-back for collateral.    JAYDEN Bee

## 2023-03-15 NOTE — TELEPHONE ENCOUNTER
Mental Health &Addiction (MH&A)Transition Clinic (TC):     Provides Patient Support While Waiting to Access Programmatic and Outpatient MH&A Care and Provides Select Crisis Assessment Services     NURSING Referral Review  _________________________________________    This RN has reviewed this Medication Management referral to the Transition Clinic and deemed the referral   [x] Appropriate x  [] Inappropriate   []Consulting     Based on the following criteria:    Pt has a psychiatric provider (or pending plan) in place for future prescribing: Yes:   To: Home with Med Management   Provider(s) Ameena Romero with [facility]   Location [address], [address]   Date/Time 3/22 @ 10am      Timeframe until pt's scheduled psychiatry appointment is less than 6 months: Yes: 7 days     Pt takes psychiatric medications: Yes:   DULoxetine (CYMBALTA) 30 MG capsule Take 30 mg by mouth daily      Pt's goals seem to align with this temporary service: Yes: Transition Clinic to bridge psychiatric care and psychiatric medication management until next Level of Care.         Any additional pertinent information regarding this referral: Patient was seen in the ED on 3/14/23 to 3/15/23. Per ED HPI. Nancy Montemayor is a 62 year old female with hx of fibromyalgia, hypertension, depression who presents with her sister for mental health evaluation. The patient was with friends tonight and she talked about thinking about suicide every day which has been going on for quite awhile.  They felt she should come for evaluation. She says she has been depressed for a long time and had suicidal thoughts for a long time but it seems to have worsened the past 2 months.  She doesn't like her job. 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  Her parents are elderly and were both in the hospital with influenza in February. Her mother was just placed in a nursing home.  She lives alone. Her adult children live in [place]. She does  not talk to her daughter but talks to her son once a week. She hasn't seen him for about 1 year.  She uses thc gummies sometimes and smokes thc about once a day.  She is on cymbalta 60 mg dose for a long period of time via her pmd.  She does not have a therapist or mental health provider. No prior mental health admissions.  She doesn't think she would ever kill herself because of her parents.  She has thought about ways of doing it but denies plan or intent. She finds herself sleeping a lot.  Saw pmd on 3/9/23 and they placed therapy and psychiatry referrals.     Initial contact w/ patient/parent: Transition Clinic RN called and spoke with Nancy Montemayor who stated that she was interested in Transition Clinic bridging service until her next level of care appointment with Ameena Romero MD. TC Coordinator to contact this patient/patients guardian to schedule a New Person Visit with TC Provider Chela Levy.       Additional Scheduling Instructions for Transition Clinic Coordinator:   TC Coordinators:  This is a Medication management and Therapy  Referral.        Please call the patient at [phone] . Please schedule a NewPerson appointment with TC Provider Chela Levy as soon as possible or as indicated by the patient.       TC RN informe this (patient) as to the purpose and benefit of the TC.      The Transition Clinic is a Temporary Service that helps to bridge the time to your next appointment.  It is not intended to be a long-term service and you are expected to attend your scheduled appointment with your next provider.      Patient/Parent/ Facility Staff Member verbalized understanding x    If you need support between appointments, please call [phone] and let them know you're seen by Transition Clinic Psychiatry.  You may also send a [employer] message to reach us.        RN Signature Roberto Carlos Dominguez RN on 3/15/2023 at 8:17 AM         FW: Medication Management and Therapy  Received: Today  Sudhakar  Nasreen MATUTE Transition [facility] Magdalena Fraser,     Medication-     Next Level of Care Patient Will Be Transitioned To: Home with Med Management   Provider(s)Ameena Romero with [facility]   Location [address], [address]   Date/Time 3/22 @ 10am       Thank you!           Previous Messages       ----- Message -----   From: Kushal Carreno   Sent: 3/15/2023  12:43 AM CDT   To: Transition Clinic   Subject: Medication Management and Therapy                 Transition Clinic Ref[place]         Please Check Type of Referral Requested:       __x__THERAPY: The Transition clinic is able to schedule patients without current medical insurance; these patient will be referred to our Social Work Care Coordinator for Medical Insurance              Assistance. We are open for referral for psychotherapy. Patient is referred from:  Assessment Center       __x__MEDICATION:  Referrals for Medication are ONLY accepted from the following areas (select): Emergency Department/Urgent Care - HIGH PRIORITY                                       Suboxone and Opioid Management Referrals are automatically denied. TC Psychiatry cannot see patient without active medical insurance.   TC Psychiatry cannot accept patient with next level of care scheduled with PCP       Referring Provider Contact Name: Torri Metcalf; Phone Number: [phone]     Reason for Transition Clinic Referral: Medication Management and Therapy. Patient is very worried about not having appointments set up until next week.     Next Level of Care Patient Will Be Transitioned To: Home with Therapy   Provider(s)Luis A Garcia with [facility]   Location [address]   Date/Time 3/21 @ 10am     Next Level of Care Patient Will Be Transitioned To: Home with Med Management   Provider(s)Ameena Romero with [facility]   Location [address], [address]   Date/Time 3/22 @ 10am     What Would Be Helpful from the  Transition Clinic: Medication Management and Therapy      Needs: NO     Does Patient Have Access to Technology: Yes     Patient E-mail Address: [email or web address]     Current Patient Phone Number: [phone]     Clinician Gender Preference (if applicable): NO     Patient location preference: [place]

## 2023-03-15 NOTE — ED TRIAGE NOTES
Patient reports suicidal thoughts, making plans. Feeling these thoughts on and off for months.        Triage Assessment     Row Name 03/14/23 2121       Triage Assessment (Adult)    Airway WDL WDL       Respiratory WDL    Respiratory WDL WDL       Skin Circulation/Temperature WDL    Skin Circulation/Temperature WDL WDL       Cardiac WDL    Cardiac WDL WDL       Peripheral/Neurovascular WDL    Peripheral Neurovascular WDL WDL       Cognitive/Neuro/Behavioral WDL    Cognitive/Neuro/Behavioral WDL WDL

## 2023-03-16 ENCOUNTER — HOSPITAL ENCOUNTER (OUTPATIENT)
Dept: BEHAVIORAL HEALTH | Facility: CLINIC | Age: 63
Discharge: HOME | End: 2023-03-16
Attending: FAMILY MEDICINE | Admitting: FAMILY MEDICINE
Payer: COMMERCIAL

## 2023-03-16 ENCOUNTER — BEH TREATMENT PLAN (OUTPATIENT)
Dept: BEHAVIORAL HEALTH | Facility: CLINIC | Age: 63
End: 2023-03-16
Attending: PSYCHIATRY & NEUROLOGY

## 2023-03-16 ENCOUNTER — VIRTUAL VISIT (OUTPATIENT)
Dept: BEHAVIORAL HEALTH | Facility: CLINIC | Age: 63
End: 2023-03-16
Payer: COMMERCIAL

## 2023-03-16 DIAGNOSIS — F33.1 MODERATE EPISODE OF RECURRENT MAJOR DEPRESSIVE DISORDER (H): Primary | ICD-10-CM

## 2023-03-16 PROCEDURE — 99205 OFFICE O/P NEW HI 60 MIN: CPT | Mod: VID | Performed by: NURSE PRACTITIONER

## 2023-03-16 PROCEDURE — 90791 PSYCH DIAGNOSTIC EVALUATION: CPT | Mod: GT,95 | Performed by: COUNSELOR

## 2023-03-16 RX ORDER — ESTROGEN,CON/M-PROGEST ACET 0.3-1.5MG
1 TABLET ORAL
COMMUNITY
Start: 2023-02-27

## 2023-03-16 RX ORDER — BUPROPION HYDROCHLORIDE 300 MG/1
300 TABLET ORAL EVERY MORNING
Qty: 30 TABLET | Refills: 0 | Status: SHIPPED | OUTPATIENT
Start: 2023-03-16 | End: 2023-03-29 | Stop reason: DRUGHIGH

## 2023-03-16 RX ORDER — AMLODIPINE BESYLATE 5 MG/1
1 TABLET ORAL DAILY
COMMUNITY
Start: 2023-03-09

## 2023-03-16 RX ORDER — ATORVASTATIN CALCIUM 20 MG/1
20 TABLET, FILM COATED ORAL
Status: ON HOLD | COMMUNITY
Start: 2023-02-01 | End: 2023-08-24

## 2023-03-16 RX ORDER — DULOXETIN HYDROCHLORIDE 60 MG/1
60 CAPSULE, DELAYED RELEASE ORAL DAILY
COMMUNITY
Start: 2023-01-14 | End: 2023-03-29 | Stop reason: SINTOL

## 2023-03-16 RX ORDER — ALPRAZOLAM 0.25 MG
0.25 TABLET ORAL PRN
Status: ON HOLD | COMMUNITY
Start: 2023-03-09 | End: 2023-08-18

## 2023-03-16 RX ORDER — BUPROPION HYDROCHLORIDE 150 MG/1
150 TABLET ORAL EVERY MORNING
Qty: 10 TABLET | Refills: 0 | Status: SHIPPED | OUTPATIENT
Start: 2023-03-16 | End: 2023-03-29

## 2023-03-16 ASSESSMENT — ANXIETY QUESTIONNAIRES
IF YOU CHECKED OFF ANY PROBLEMS ON THIS QUESTIONNAIRE, HOW DIFFICULT HAVE THESE PROBLEMS MADE IT FOR YOU TO DO YOUR WORK, TAKE CARE OF THINGS AT HOME, OR GET ALONG WITH OTHER PEOPLE: SOMEWHAT DIFFICULT
2. NOT BEING ABLE TO STOP OR CONTROL WORRYING: NEARLY EVERY DAY
7. FEELING AFRAID AS IF SOMETHING AWFUL MIGHT HAPPEN: NOT AT ALL
2. NOT BEING ABLE TO STOP OR CONTROL WORRYING: MORE THAN HALF THE DAYS
7. FEELING AFRAID AS IF SOMETHING AWFUL MIGHT HAPPEN: NOT AT ALL
GAD7 TOTAL SCORE: 9
1. FEELING NERVOUS, ANXIOUS, OR ON EDGE: MORE THAN HALF THE DAYS
7. FEELING AFRAID AS IF SOMETHING AWFUL MIGHT HAPPEN: NOT AT ALL
3. WORRYING TOO MUCH ABOUT DIFFERENT THINGS: NEARLY EVERY DAY
3. WORRYING TOO MUCH ABOUT DIFFERENT THINGS: MORE THAN HALF THE DAYS
5. BEING SO RESTLESS THAT IT IS HARD TO SIT STILL: NOT AT ALL
6. BECOMING EASILY ANNOYED OR IRRITABLE: SEVERAL DAYS
4. TROUBLE RELAXING: NOT AT ALL
GAD7 TOTAL SCORE: 7
GAD7 TOTAL SCORE: 7
6. BECOMING EASILY ANNOYED OR IRRITABLE: SEVERAL DAYS
4. TROUBLE RELAXING: SEVERAL DAYS
GAD7 TOTAL SCORE: 7
GAD7 TOTAL SCORE: 9
1. FEELING NERVOUS, ANXIOUS, OR ON EDGE: SEVERAL DAYS
5. BEING SO RESTLESS THAT IT IS HARD TO SIT STILL: NOT AT ALL
8. IF YOU CHECKED OFF ANY PROBLEMS, HOW DIFFICULT HAVE THESE MADE IT FOR YOU TO DO YOUR WORK, TAKE CARE OF THINGS AT HOME, OR GET ALONG WITH OTHER PEOPLE?: SOMEWHAT DIFFICULT

## 2023-03-16 ASSESSMENT — PATIENT HEALTH QUESTIONNAIRE - PHQ9
SUM OF ALL RESPONSES TO PHQ QUESTIONS 1-9: 15
SUM OF ALL RESPONSES TO PHQ QUESTIONS 1-9: 23
10. IF YOU CHECKED OFF ANY PROBLEMS, HOW DIFFICULT HAVE THESE PROBLEMS MADE IT FOR YOU TO DO YOUR WORK, TAKE CARE OF THINGS AT HOME, OR GET ALONG WITH OTHER PEOPLE: VERY DIFFICULT
SUM OF ALL RESPONSES TO PHQ QUESTIONS 1-9: 15

## 2023-03-16 NOTE — PROGRESS NOTES
" This video/telephone visit will be conducted virtually between you and your provider. We have found that certain health care needs can be provided without the need for an in-person physical exam. This service lets us provide the care you need with a video /telephone conversation. If a prescription is necessary we can send it directly to your pharmacy. If lab work is needed we can place an order for that and you can then stop by our lab to have the test done at a later time.\"   Just as we bill insurance for in-person visits, we also bill insurance for video/telephone visits. If you have questions about your insurance coverage, we recommend that you speak with your insurance company.      Patient/Parent has given verbal consent for video/Telephone visit? yes    Patient would like the video visit invitation sent by: Send to email: [email or web address]. if difficulties connecting text to [phone]    Patient verified allergies, medications and pharmacy via phone. Patient states they are ready for visit.    Mental Health &Addiction (MH&A)Transition Clinic (TC):     Provides Patient Support While Waiting to Access Programmatic and Outpatient MH&A Care and Provides Select Crisis Assessment Services     INTAKE  ____________________________________________________    \"The Transition Clinic is a temporary psychiatry service that helps to bridge the time to your next appointment. It is not intended to be a long-term service and you are expected to attend your scheduled appointment with your next provider.\"  [x] Patient/Parent verbalized understanding    If you need support between appointments, please call [phone] and let them know you're seen by Transition Clinic Psychiatry. You may also send a [employer] message to reach us.    General-     Most pressing MH needs at this time: depression and anxiety      Any physical health conditions or diagnoses we should be aware of or that are impacting you: fibromyalgia, has only " one kidney      Medications-     Injectable medications currently prescribed: None  If yes, do you need an appointment for the next injection: NA    Any Controlled Substances that you are prescribed: Alprazolam      Primary care provider: Anisha Cosme MD, MD        RAMAN-7 scores:  9    PHQ-9 scores: 23 Flag for SI        Anything the provider should be aware of for today's appointment: pt reports sleeping all the time. Duloxetine dose is 60 mg/day    New (awaiting) Mental health provider or next programming:  Summer Romero with [facility]     Date of scheduled apt: 3/22/23        Nohemi Russell LPN on March 16, 2023 at 9:46 AM

## 2023-03-16 NOTE — PROGRESS NOTES
"[facility] [place]      Mental Health & Addiction Service Line    Transition Clinic: Psychiatry Note  Medication Management              Charts/documentation reviewed within EMR:  -3/14/2023 x2    -3/9/2023          VISIT INFORMATION    Date:  2023     Number:  -Initial     Referral source:  -ED      Patient Identifying Information:  Legal name: Nancy Montemayor  Preferred name: Nancy  : 1960  Preferred pronouns: She/her      Participants:   -Patient  -Provider          Telehealth visit details:  Type of service:  Video  Patient location:  At home, Off site  Provider Location:  [facility] Mental Health & Addiction Service Line  Platform utilized:  Bestcake    Start time: 10:37 am  End time:  11:10 am      HPI    Copied/Pasted from 3/14/2023 DEC encounter:    Patient presents to [facility] ED with her sister for evaluation of suicidal ideation. Pt has a hx of depression. The patient was with friends tonight and she talked about thinking about suicide every day which has been going on for quite awhile. She reports her friends encouraged her to come to the ED for evaluation. Pt confided in her sister as well who then accompanied her to the ED this evening. Pt endorses depression and SI for a long time, but this has worsened over the past 2 months. Pt reports being very unhappy at her current job. She works at [employer] and starts her shift each day at 4 am. Reports it is very stressful and the hours are hard on her. She reports feeling as though she does not have the skill set to find a better job and states \"that is depressing in itself.\" Reports stress about her finances if she were to stop working. She reports sleeping excessively due to these hours, but also due to her depression.    She reports withdrawing from others more, not responding as much to others and cancelling plans. She reports her parents are elderly and were both hospitalized with influenza recently. Her mother was recently " placed in a nursing home. Her adult children live in [place]. She does not talk to her daughter but talks to her son once a week. She reports she only gets to see him once per year though over the holidays.     She endorses using thc gummies occasional and smoking marijuana daily for the past 2 years. She reports she recently started drinking wine and has 1 glass per day. She has a PCP who prescribes her cymbalta 60 mg which she is compliant with. She recently asked for an increase, but her PCP told her that was the max she could do but offered to refer her to a psychiatrist. She does not have any other outpatient providers at this time. No hx of IP MH. Patient endorses SI, but denies plan or intent. States she does not believe she would ever follow through with this and is able to identify protective factors, including her parents. She denies HI, NSSI, hallucinations or delusions.           PSYCHIATRIC ROS    Sleep:   -To much  -Last Sunday slept 16 hours  -Don't like going to bed early but should because I have to get up around 4 am for my job        Appetite/Weight Changes:   -Denies unintentional loss or gain > 10 lbs in the past 4 weeks      Energy Levels:   -3/10  -Yesterday just didn't have the motivation to shower or do much, but today I did get up and take a shower      Depression/Anxiety:   -See above      Suicidal ideations:   +Passive ideations  -Denies intent or plan      SIB:  -Denies hx or current engagement of self harm       Side effects:  -Not using Xanax .25 mg prn because it just makes me tired and I'm already sleeping to much        MENTAL HEALTH HISTORY      Individual therapy or IOP:   -Hx of: individual therapy+ marriage couseling      Suicide attempts:  -None reported       Inpatient psychiatric hospitalizations:  -None reported   -No hx of commitments       ECT:  -None reported         Medication Trials:    SSRIs:  -Celexa  -Prozac    SNRIs:  -Effexor    Other  antidepressants:  -Wellbutrin 450 mg        SUBSTANCE USE    Prior use:  -Previously attended [facility], age 26 y/o for THC usage        Current use:    Alcohol:   -1 glass wine per day      Recreational Drugs:   -THC smokes daily + uses gummies      Medical Marijuana:  -None reported       Cigarettes per day:   -None reported       Chewing tobacco:   -None reported       Vaping:    -None reported       Caffeine intake:    -None reported             SOCIAL HISTORY  -Was reviewed within the EMR per: 3/14/2023 encounter by JAYDEN Corona          MEDICAL HISTORY    Current:  -The problem list was reviewed prior to the appointment  -The patient denies any concerning physical and or medical symptoms during the interviewing process      Developmental:   -Mother had normal pregnancy: Yes  -Met age appropriate milestones: Yes  -Participated in special education classes and or had an IEP: No, but might have had ADHD  -Hx of autism spectrum disorder, learning disability, and or other cognitive disorder: No      Neurological:  -Denies any hx of: seizures, concussions, or TBI        MEDICATIONS      Current Outpatient Medications:      DULoxetine (CYMBALTA) 30 MG capsule, Take 30 mg by mouth daily, Disp: , Rfl:           If a controlled substance has been prescribed during the appointment:    -The [place] Prescription Monitoring Program has been reviewed and there are no current concerns with: diversionary activity, early refill requests, and or obtaining the medication from multiple providers.          VITALS    BP Readings from Last 3 Encounters:   03/15/23 (!) 122/90   03/02/17 (!) 179/91       Pulse Readings from Last 3 Encounters:   03/15/23 97   03/02/17 65       Wt Readings from Last 3 Encounters:   03/14/23 80.3 kg (177 lb)   03/02/17 70.3 kg (155 lb)       LABS    The following have been reviewed prior to or during the appointment:  -3/9/2023      SCALES    No flowsheet data found.     No flowsheet data found.    "    MENTAL STATUS EXAMINATION    Appearance: Well Groomed, Attire Appropriate for the Season  General Behavior:  Cooperative, Direct Eye Contact  Speech: Fluent, Normal rate and volume  Musculoskeletal:    -Gait not observed during t.h. visit  -No facial tics/tremors observed   -Motor coordination is grossly intact   Mood: Depressed, Down  Affect: Appropriate to Content of Speech and Circumstances  Attention: Intact  Orientation:  Person, Place, Time, Situation  Thought Associations:  Intact  Thought Content: Reality based   Thought Processes: Organized, Normal rate  Memory: No overt impairment; no screenings or formal testing performed  Language: Intact  Judgement: Fair to good  Insight: Fair to good        ASSESSMENT/CLINICAL IMPRESSIONS    Summary:    Nancy Montemayor is a 61 y/o female with a history of: MDD, Anxiety Disorder + Panic attacks.    Clinical/medical decision making is complex based on: age, problem list (see 3/9/2023 PCP encounter for further details), chronic pain, and polypharmacy.    Recently went to the ED on 3/14/2023 in the context of worsening of depressive symptoms + suicidal ideations.    Nancy outlines (also reviewed per the above encounter by PCP) she previously was on Wellbutrin   450 mg/day which was keeping mood stable and \"effective\".  However, she ended up eventually being switched from Wellbutrin to Cymbalta since she could no longer be on Celbrex (elevated Cr + s/p nehrectomy 2020) as a means to try and address fibromyalgia pain + MDD.    Will restart Wellbutrin XL with titration to 300 mg/day.   Discussed extensively with Nancy the drug + drug interaction of Cymbalta + Wellbutrin (see below) per [employer] and [employer] :      Concurrent use of BUPROPION and SEIZURE THRESHOLD LOWERING CYP2D6 SUBSTRATES may result in increased exposure of CYP2D6 substrates; increased risk of seizure.    CYP2D6 Inhibitors (Strong) may increase the serum concentration of DULoxetine.     Also highlighted " both antidepressants can increase heart rate, bp, therefore monitor these remain WNL.    Continues to experience: hypersomnia, poor motivation, anhedonia, and low mood.   Endorses passive suicidal ideations since discharge from the ED but denies intent/plan and identifies a good support system: her sister + girlfriends are calling/checking in on her regularly.   Is forward thinking/future oriented.   Reviewed safety plan (see below) + provided additional crisis resources (see AVS).      DSM-V and or working diagnosis:    1. Moderate to severe episode of recurrent major depressive disorder (H)    2. Hx of: Anxiety Disorder        SAFETY EVALUATION:  Suicidal ideations:  +passive ideations  -denies intent or plan  Homicidal ideations:  -denies  Risk factors:  -currently single/  -psychosocial stressors: financial strain  Protective and mitigating factors:  -parents, siblings, friend, son  -no prior attempts  Risk assessment:  -low to medium      SAFETY PLAN:  -Has numbers of at least 1 family member + 1 friend in personal contact list  -Knows clinic number(s) + operation of regular business hours   -Reviewed to utilize 988 or text [place] to [phone] for mental health crisis  -Discussed to call 911 and or return to the nearest Emergency Department if unable to maintain safety of self or others (due to severity of suicidal and or homicidal ideations)          TREATMENT PLAN      Medications:  Start:  Bupropion/Wellbutrin 150 mg XL in the AM for 10 days then increase to 300 mg in the AM thereafter    Continue:  Duloxetine/Cymbalta 60 mg daily      Labs:  -None Obtained        Therapy and or Non-pharmacological modalities:  -Individual therapy recommended + psychotropics  -Discontinue drinking 1 glass of wine daily        Return to Clinic or Referrals:  -You do not need to return to the Transition Clinic for medication management  -Please make sure to keep the appointment for longitudinal outpatient psychiatry  services      Provider(s): Ameena Romero  Clinic/Location: [facility], [address], [address], [place] [address]  Phone: [phone]  Date/Time:  3/22/2023 @ 10am      Total time: 62 minutes per:    -Review of EMR   -Appointment time  -Documentation           Chela CRAVEN  [facility]          ----------------------------------------------------------------------------------------------------------------------        TREATMENT RISK STATEMENT    The risks, benefits, alternatives, and potential adverse effects have been explained and are understood by the patient.  The patient agrees to the treatment plan with their ability to do so.      The patient knows to call the clinic: [phone]  for any problems or concerns until the next psychiatry visit, regardless if it is within or outside of the [facility] system.     If unable to reach clinic staff (via phone call or medical messaging) during the normal business hours: 8:00 am to 4:30 pm then it is recommended accessing the nearest: emergency department, urgent care facility, or utilizing local (varies based on county of residence) and national crisis #'s or text messaging services for immediate assistance.          ----------------------------------------------------------------------------------------------------------------------        If applicable the following has been discussed with the patient, parent/guardian, and or attending family member during the appointment:    1. Risks of polypharmacy and possible drug interactions with current medication list + common OTC products, herbs, and supplements.  Moving forward, it is suggested to intermittently check-in with a clinic or retail pharmacist whenever new medications or OTC/h/s are consumed.    2. Risks of polypharmacy and possible drug + drug interactions with current medication list.  Moving forward, it is suggested to intermittently check-in with a clinic or retail pharmacist  whenever new prescription medications are added to your treatment regimen.    3. Recommendation to adhere to CDC guidelines as it relates to alcohol consumption.  If taking benzodiazepines, you should abstain from alcohol intake due to increased risks of CNS and respiratory depression, as well as psychomotor impairment.    4. If possible, it is recommended to avoid concurrent use of prescribed: opioids + benzodiazepines due to increased risks of CNS and respiratory depression, as well as the increased risk of overdose.     5. Recommendation to minimize and or abstain from THC use (unless you are prescribed medical marijuana).    6. Recommendation to abstain from illicit substances including but not limited to the following: heroin, street fentanyl, cocaine, methamphetamines, bath salts, and other synthetic products.    7. Recommendation to abstain from tobacco/smoking/nicotine, alcohol, THC, and all illicit substances if trying to become or are pregnant.    8. Do not take opioids, stimulants, and or other prescription medications unless they are specifically prescribed for you.     9. Black Box Warnings associated with the prescribed psychotropic(s).    10.  Potential adverse effects of antipsychotics including but not limited to the following: weight gain, metabolic syndrome, EPS/Tardive Dyskinesias, and Neuroleptic Malignant Syndrome.    11. Potential adverse effects of stimulants including but not limited to the following: sudden death, MI, stroke, HTN, cardiomyopathy (long term use), seizures, janet, psychosis, and aggressive behaviors.

## 2023-03-16 NOTE — PATIENT INSTRUCTIONS
-You do not need to return to the Transition Clinic for medication management  -Please make sure to keep the appointment for longitudinal outpatient psychiatry services      Provider(s): Ameena Romero  Clinic/Location: [facility], [address], [address], [place] [address]  Phone: [phone]  Date/Time:  3/22/2023 @ 10 am      --------------------      Start:  Bupropion/Wellbutrin 150 mg XL in the AM for 10 days then increase to 300 mg in the AM thereafter    Continue:  Duloxetine/Cymbalta 60 mg daily      If you or someone you know is experiencing a mental health crisis contributing to immediate danger that is life threatening: call 911 and or go to the nearest emergency department.    ----------------------------------------------------------------------------------------------------------------------      Call **CRISIS (**[phone]) from a cell phone anywhere in the [facility] to reach the local [facility] crisis team.      ----------------------------------------------------------------------------------------------------------------------      Please use the following websites to obtain a comprehensive list of all counties within the [facility] for adult and child mental health crisis numbers:    https://[place].gov/dhs/people-we-serve/people-with-disabilities/health-care/adult-mental-health/resources/crisis-contacts.jsp    https://[place]/dhs/people-we-serve/people-with-disabilities/health-care/childrens-mental-health/resources/crisis-contacts.jsp      Below is a list of county (also found on the above websites), state, and national crisis numbers.   These resources can provide real-time assistance for moderately severe mental health symptoms.    Additionally, please visit your county website to find the most up-to-date list of local:  adult, child, family, and chemical dependency services available.          [facility]  [phone]      [facility]  [phone]      [facility]  [facility]  [phone]      [facility]  [phone]      [facility]  [phone]: Adults 18 and over    [phone]: Children 17 and under    [facility] ([facility] Acute Psychiatric Services (APS) Assessment & Referral:   [phone]    Community Outreach for Psychiatric Emergencies (COPE):  [phone]      [facility]  [phone]: Adults 18 and over    [phone]: Children 17 and under      Walk-in crisis services are available Monday to Friday from 8 am to 5:30 pm at Urgent Care for  Adult metal health:    [address] [place] [address]  Closed on Saturday, Sunday, and holidays      [place]  [phone]: Toll free      [facility]  [phone]      [facility] [place]  [phone]: Toll free    Text: [place] to 619210      [facility] and human services  Call 211 or visit [email or web address] to obtain free and confidential h/hs information       [place] [facility]  If you are an adult needing support, you can talk to a specialist who has first hand experience living with a mental health condition:    [phone]    Text: Support to 45137      [facility] [place]:  domestic violence/LGBTQ+  [phone] option 3      [employer]: LGBTQ+  2-852-614-7117    Text: START to 633664       Resources  0-555-JsiqCnh: (1-928.839.2454)    Crisis line: 1-513.291.2406    Text: 666278      [employer]: women and sexually exploited youth  [phone]      [employer] Domestic Violence Shelter Hotline  539.213.1489      National Suicide Prevention Lifeline  981    National Youth Crisis Hotline  460
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
## 2023-03-16 NOTE — PATIENT INSTRUCTIONS
Welcome to [facility] Adult Mental Health Outpatient Programs    Thank you for choosing [facility]!    Congratulations! You have completed the first step in your recovery by participating in a Diagnostic Assessment. With your input, [facility], is recommending the following level of care and services to best meet your needs.    Managing mental health symptoms while balancing life stressors can be difficult. Our mental health programming will provide the group therapy, education, skills, and support needed to improve your well-being while living a healthy and manageable lifestyle.    All our Adult Mental Health Outpatient Programs are group-based and allow you to meet with peers who are trying to manage similar symptoms and/or life circumstances in a safe and therapeutic setting.    Recommendations and Plan:    Level of Care:  Admission Intensive Outpatient Program     Start Date:  March / 17 / 2023    Schedule:  Admission IOP AM: Monday through Thursday @ 9 AM to 11:50 AM    If you were placed on a Wait List following your Diagnostic Assessment, please expect the following:  You will receive a phone call from the program within a few days to discuss a start date and plan.    Please contact the program at the number listed above if you are choosing to be removed from the Wait List, need to reschedule your start or if you have any additional questions.    Type of Participation:  Virtual     We are currently providing 100% online group-based programming. It is a requirement that you be physically in the State of [place] when accessing services. Our providers must be licensed in the state you are located in.      To provide the best group experience for everyone, we expect confidentiality, regular attendance, and respectful participation by all.      Cameras need to be on during groups. We want to see you!   Be sure to be in a private place where others will not overhear or walk in. Using headphones  and making sure your screen is not visible to others are steps you can take to ensure confidentiality.   What is said in group, stays in group. (All personal or identifying information shared in group should be kept confidential and not shared with anyone).  NOTE: Audio or Visual recordings are not allowed and may result in immediate discharge from the program.  Zoom may automatically show your first and last name unless you change it when logging into group. We encourage you to change your name to your first or preferred name. You may also include preferred pronouns.   Please be sitting upright in a comfortable position. This will maximize engagement and participation for everyone.   Please refrain from smoking, eating, driving, or engaging in other distracting activities during groups.  Facilitators may provide reminders of the above expectations during group as needed.    If your camera is off and you are not responding to prompts, facilitators will assume you have left and place you in the waiting room. You will need to request to return to group.    Please do NOT cancel your appointments through [employer].  If you are going to be absent, please contact the program number and leave a message so staff will not expect you.   You will NOT be billed for any sessions you do not attend.    See additional attendance and program participation information below.     Accessing Virtual Groups:    The best way to attend groups is through your [employer] account. Please ask staff if you need assistance setting up an account. [employer] HELPLINE: [phone] or [employer] - Login Page ([email or web address]).  You will also need to download Zoom Download for Windows - Zoom to your computer (preferred), phone or iPad/Tablet devise. It is NOT necessary to log into or set up a Zoom account.  Log into My Chart each day before group.   Go to the  Visits  tab and select the current date.    You will be asked to verify personal information on the  first day or for longer programs, every 30 days. Please allow extra time on your first day to complete this. You will also be asked to complete assessments regularly so we can monitor your progress and address concerns.    The daily invite through [email or web address] expires 15 minutes after group starts.   You will need to call the program number to request a link to the group if you:   log out of group once it begins   are late to group   get disconnected   are unable to access group for any reason    There is a new link created every day.    Breaks are provided between groups (10 minutes)   Do not log out.  You may mute or pause your video.   The group facilitator may put you in virtual waiting room until the next group starts.        Admission Intensive Outpatient Treatment Program Overview  (Admission-IOP) [phone]      Patients will be scheduled to start the Admission IOP track which meets four days/week. The treatment team will provide education, skills training, and support to build a foundation for a successful treatment experience. The focus is to help orient new patients to programming and prepare them for the best possible outcomes. This track is intended to be short-term, and most patients will participate for approximately one week. Providers will continue to assess your needs, help you set up an individualized treatment plan and, with your input, determine which treatment team and peer group is best for you.       Topics covered include: a review of program expectations and structure, assessing group readiness, introduction to understanding healthy vs. unhealthy coping strategies, experiential mindfulness, and working to identify and create social supports and services. Additional topics will align with those listed in the grid below. All groups are facilitated by a Licensed mental health professional and include the expertise of a Registered Nurse and Occupational Therapist.       Patients will also see the  program Psychiatrist and/or a psychologist on the first or second day of treatment.      Patients will transition to an IOP  home track,  where they will continue their treatment. The  home track  will meet three days per week. See more information in the grid.      Most patients attend the combined IOP services for up to 12-weeks. Time frames may vary throughout this process as patients will transfer when an appropriate placement opportunity is available. Your treatment team will work closely with you for a smooth transition.       [facility] provides several  home track  IOP options. Sometimes patients and/or the treatment team may determine that another service is recommended. Referrals will be offered when appropriate.      Intensive Outpatient Program Overview:   This level of care is less intensive than an inpatient or partial hospitalization program and provides more support than traditional individual psychotherapy.        Length of Stay Typically, patients attend up to 12 weeks of programming, although this can     vary depending on specific patient needs.      Treatment team During the Admission IOP patients will meet with a multi-disciplinary team  including: a psychiatrist, psychotherapist, registered nurse, and occupational     therapist.      When transferred to an IOP  home group , patients will continue groups with a team of licensed mental health professionals including psychotherapists and a psychiatrist and/or psychologist.          Group-based programming 3 groups per day; 3-4 days per week   50 minutes per group   10-minute break between each group   Facilitated by a member of the treatment team      Group Psychotherapy is provided daily, allowing time to check-in, process concerns and share feedback/support. All other groups include a topic and provide opportunities for education, skill building, discussion, and support.       Example   Group   Topics Admission IOP topics are listed above  and will align with additional group topics covered throughout the 12-week combined programming.       Topics may include:        Behavior Activation, Cognitive Restructuring: Cognitive Distortions, Communication Skills/ Areas for Self-Improvement. Coping Skills, Discharge Planning, Dimensions of Wellness, Emotions, Forgiveness, Functional Nutrition, Grief, Life Transitions, Leisure Exploration, Life Skills, Listening for Meaning, Medication Assessment, Mental Health Social Support, Mindfulness, Mood Tracking/Triggers, Motivation and Procrastination, Relationship, Relaxation Techniques, Self-Awareness, Self-Confidence, Self-Care, Self-Compassion, Self-Esteem and Self-compassion, Shame and Guilt, Sleep hygiene, SMART Goals, Stages to Ord with Stress, Stigma, Strategies to Improve Motivation, Symptom Awareness, Time Management, Trauma Triggers, Values, Wellness       Psychiatrist and/or Psychologist Patients will continue to see the program psychiatrist and/or psychologist for follow-up every 30 days or more frequent, as needed.       If seeing the psychiatrist, they will partner with you and your other providers for medication management, as needed.    Psychiatry services will be billed separately.    For insurance purposes, please coordinate with team to prevent scheduling to see the program psychiatrist on the same day you see your outpatient psychiatrist.    If seeing the psychologist, they will provide individual therapy. Medication management will not be provided.       NOTE: Either provider will continue to assess your progress and coordinate with the treatment team to determine when you may be ready for completion.  This is a program requirement.        Individual Therapy See psychologist services above.   Physical Health Screen You will meet with a program nurse within the first week to complete a brief physical health screen. This is a program requirement.   FMLA or Short-term Disability We encourage you to  request completion of paperwork from your long-term providers.   If this is not an option, please notify the program nurse as soon as possible.  We will do our best to help you coordinate completion of paperwork.   Medical Record requests: please contact Release of Information  at [phone] and allow up to 14 days for records once the authorization for release is received.    Treatment and Discharge Planning Patients meet individually with team members for treatment planning.     We will help you develop goals and identify strengths and/or barriers.   We will discuss your program participation, progress, and discharge planning.   We are available to assist with referrals and service coordination; please let us know how best we can support your specific needs.   You will receive copies of your treatment plan and discharge summary via [employer].          An Important Note from your Program Treatment Team...    Welcome! We are happy to be partnering with you on your recovery journey. Our experienced clinicians have developed programming based on current research and evidence-based practice to provide you with high quality mental health treatment.     Attendance and Program Participation:  You will participate in a variety of groups each day. Our goal is to provide you with a rich and varied therapeutic healing environment knowing patients have unique experiences and preferred ways of sharing, learning, processing, and engaging in the recovery process.  You are expected to attend all groups on time.  If you are going to be late or absent, please let a team member know the reason. You can also leave that same information at the number listed above.  In the event of an unreported absence, we will reach out to you. If we are unable to reach you, know that we may call your emergency contact.  We always attempt to establish contact with your emergency contact prior to initiating a wellness check.  While it is important that  you continue to attend appointments with your individual therapist, psychiatrist, and other medical providers, you are encouraged to schedule these appointments outside of group hours whenever possible.  If your attendance becomes a concern, your treatment team will have a discussion with you and may start an Attendance Agreement. Following your Attendance Agreement is required to prevent early discharge from the program.  To get the most out of the program and to support your wellbeing we require that you do not use any chemicals, tobacco or vape products on screen or during program hours. The team is available to assist you if this is something you struggle with.  Please be mindful that you are part of a group; therefore, we ask that you be respectful of other group members' needs and do not use derogatory, offensive, or discriminatory language.  You will be removed from group if suspected of being under the influence of substances or if using language that negatively impacts the group.  Your treatment team will address any concerns with you and offer recommendations. A Behavior Agreement may be started. Following your Behavior Agreement is required to prevent early discharge from the program.  Communication with other group members outside of group is discouraged. This can affect your treatment and the way the group functions.  If you choose to share contact information with group peers AFTER you are discharged from the program, this decision is completely independent of any program coordination or support.  While in the program, participants may not engage in any sexual or intimate relationships with each other outside of group. This may result in immediate discharge of both participants from the program.   Trauma:  Many of our patients have experienced trauma. You are not alone. This can be challenging for patients to manage. All our team members have been trained in Trauma Informed Care and will provide you  "with the education, skills training, and support that you need to stabilize your symptoms.  Specific details and descriptions of abuse, assault, violence, neglect, etc., are best processed in individual therapy as to avoid triggering other group members.  Discussing how traumatic experiences have impacted beliefs about self/others/world and practicing skills to cope with symptoms is very appropriate for group therapy.     We look forward to working together to support your mental health.     We love feedback from our patients about our program!  Please take a few moments to respond to surveys sent out by [employer].  This will help us continue to make improvements and to keep the things that are   important to you!    [facility]                                        Nancy Montemayor      SAFETY PLAN:  Step 1: Warning signs / cues (Thoughts, images, mood, situation, behavior) that a crisis may be developing:  Thoughts: \"I don't matter\", \"People would be better off without me\", \"I'm a burden\" and \"I can't do this anymore\"  Images: denies  Thinking Processes: ruminations (can't stop thinking about my problems): and highly critical and negative thoughts  Mood: worsening depression, hopelessness and helplessness  Behaviors: isolating/withdrawing  and sleeping too much  Situations: \"Work is very stressful, my daughter doesn't speak to me, I don't know if I will have enough money to keep my condo and my parents are sick.\"   Step 2: Coping strategies - Things I can do to take my mind off of my problems without contacting another person (relaxation technique, physical activity):  Distress Tolerance Strategies:  listen to positive and upbeat music and watch a funny movie  Physical Activities: None  Focus on helpful thoughts:  \"This is temporary\", \"I will get through this\" and \"It always passes\"  Step 3: People and social settings that provide distraction:                " " Name:  My friends                              going out to eat          Step 4: Remind myself of people and things that are important to me and worth living for:  \"my parents and my friends\"        Step 5: When I am in crisis, I can ask these people to help me use my safety plan:                 Name: Kavita Wooten (sister)             Phone: [phone]                                    Step 6: Making the environment safe:   remove alcohol, remove things I could use to hurt myself and be around others  Step 7: Professionals or agencies I can contact during a crisis:  Suicide Prevention Lifeline: Call or Text 538   Local Crisis Services: [facility] Crisis line [phone]     Call 911 or go to my nearest emergency department.       I helped develop this safety plan and agree to use it when needed.  I have been given a copy of this plan.       Client signature _________________________________________________________________  Today s date:  3/16/2023  Completed by Provider Name/ Credentials:  Shannan Perkins, [facility], [facility] [place] [facility] [place]                                March 16, 2023  Adapted from Safety Plan Template 2008 Lola Robison and Tony Resendez is reprinted with the express permission of the authors.  No portion of the Safety Plan Template may be reproduced without the express, written permission.  You can contact the authors at [email or web address] or [email or web address].                  "

## 2023-03-17 ENCOUNTER — TELEPHONE (OUTPATIENT)
Dept: BEHAVIORAL HEALTH | Facility: CLINIC | Age: 63
End: 2023-03-17
Payer: COMMERCIAL

## 2023-03-17 NOTE — TELEPHONE ENCOUNTER
Clinician was informed that pt would not be able to start group until Monday. Clinician called pt and informed her of her new start date.

## 2023-03-21 ENCOUNTER — TELEPHONE (OUTPATIENT)
Dept: BEHAVIORAL HEALTH | Facility: CLINIC | Age: 63
End: 2023-03-21
Payer: COMMERCIAL

## 2023-03-21 NOTE — PROGRESS NOTES
"    Admission SBAR NOTE  Adult  Outpatient Programs          SITUATION:     Admission Date: 3/21/2023    Provider verified identity through the following two step process.  Patient provided: verbal spelling of full first and last name and Patient     Patient name:  Nancy Montemayor  Preferred name: Nancy She/Her/Hers/Herself 62 year old  Diagnosis/-es (copy from [employer], including ICD-10):   Principal DSM5 Diagnoses  (Sustained by DSM5 Criteria Listed Above):   296.32 (F33.1) Major Depressive Disorder, Recurrent Episode, Moderate _.       Assigned Program/Track: AIOP    Reviewed patient's schedule and informed them of any variation due to holidays. yes    Does the patient have any planned absences and/or barriers to admission/treatment? no  NOTE: impact of transportation, technology, childcare, work, or housing concerns.    Insurance: Payor: MEDICA / Plan: MEDICA CHOICE / Product Type: Indemnity /  Changes/Concerns: no    Does patient need an appointment with the program provider? Yes- appt with [employer] 3/22 at 10am  NOTE: If yes, please confirm/schedule provider visit.      BACKGROUND:     Patient's stated goal/reason for treatment (copy from [employer]; confirm with patient): \"Depression\".         ASSESSMENT:     Please consult  if any of the following concerns may impact admission/participation in program:     PHQ, RAMAN and PROMIS done within 7 days OR send upon admission if over 7 days. ( Last done 3/16/23- sent new assessments via [employer] 3/21/23)    [place] Suicide Severity Rating (select Lifetime/Recent):   [place] Suicide Severity Rating Scale (Lifetime/Recent)  [place] Suicide Severity Rating (Lifetime/Recent) 3/14/2023   Q1 Wished to be Dead (Past Month) yes   Q2 Suicidal Thoughts (Past Month) yes   Q3 Suicidal Thought Method yes   Q4 Suicidal Intent without Specific Plan no   Q5 Suicide Intent with Specific Plan yes   Q6 Suicide Behavior (Lifetime) no   Level of Risk per Screen high risk   1. Wish " to be Dead (Lifetime) 1   1. Wish to be Dead (Past 1 Month) 1   2. Non-Specific Active Suicidal Thoughts (Lifetime) 1   2. Non-Specific Active Suicidal Thoughts (Past 1 Month) 1   3. Active Suicidal Ideation with any Methods (Not Plan) Without Intent to Act (Lifetime) 1   3. Active Suicidal Ideation with any Methods (Not Plan) Without Intent to Act (Past 1 Month) 1   4. Active Suicidal Ideation with Some Intent to Act, Without Specific Plan (Lifetime) 0   4. Active Suicidal Ideation with Some Intent to Act, Without Specific Plan (Past 1 Month) 0   5. Active Suicidal Ideation with Specific Plan and Intent (Lifetime) 0   5. Active Suicidal Ideation with Specific Plan and Intent (Past 1 Month) 0   Most Severe Ideation Rating (Lifetime) 4   Most Severe Ideation Rating (Past 1 Month) 4   Frequency (Lifetime) 5   Frequency (Past 1 Month) 5   Duration (Lifetime) 3   Duration (Past 1 Month) 3   Controllability (Lifetime) 2   Controllability (Past 1 Month) 2   Deterrents (Lifetime) 1   Deterrents (Past 1 Month) 1   Reasons for Ideation (Lifetime) 4   Reasons for Ideation (Past 1 Month) 4   Actual Attempt (Lifetime) 0   Has subject engaged in non-suicidal self-injurious behavior? (Lifetime) 0   Interrupted Attempts (Lifetime) 0   Aborted or Self-Interrupted Attempt (Lifetime) 0   Preparatory Acts or Behavior (Lifetime) 0   Calculated C-SSRS Risk Score (Lifetime/Recent) Moderate Risk       LOCUS completed for recommended level of care? yes 18  IOP: 17-19; PHP: 20-22     Copy/Paste current Safety Plan to the BEH TX PLAN ENCOUNTER. yes    Safety status/concerns: denies    Substance use concerns: no     Based on the positive CAGE score and clinical interview there  are not indications of drug or alcohol abuse.      Pertinent Medical/Nutritional concerns: no    Review Tele-Health Requirements (including secure environment, confidentiality, in-state status, equipment needs and process - encourage MyChart): yes    Confirm Emergency  Contact listed in the [employer]ot/Demographics with patient and notify [facility] if an update is required. yes   Kavita [phone]     Paper or [employer] requirements for ROIs, e-JAQUI, emergency contact, etc have been completed? no educated  If not, do upon admission.     Does patient have FMLA or Short-Term Disability requests/plans? yes is an;ling out with therapist today  NOTE: Whenever possible, FMLA or Short-Term Disability paperwork needs to be managed/completed by the patient's community provider.   Exceptions: Patient does not have a community provider AND request is specific to mental health and time off for the duration of the program participation.    Notify RN Triage as soon as possible.     Care Providers/Medication Management Needs:     Does patient have a current community or other [facility] provider prescribing medications for mental health? yes  NOTE: Delete below if not applicable:    Psychiatric Provider (or PCP if managing MH meds)/Name: new intake was done for med provider Ameena Cerda       NOTE: Inform patients, program is temporary and we will not be transferring care. Patient's should continue to see their community provider.      Idividual Therapist/Name:  Madhuri Femi-  3/21/23     Patient will continue to see above provider while participating in program: yes        RECOMMENDATIONS:     Patient Admission Completed: yes    Care Team, referrals made/needed: no  PCP: Anisha Cosme MD  NOTE: Notify RN, as needed, to make internal referrals.                                                             Completed by: Nidhi Matute RN

## 2023-03-21 NOTE — PROGRESS NOTES
"Safety Plan:  Adult Long Safety Plan:      [facility]                                        Nancy Montemayor      SAFETY PLAN:  Step 1: Warning signs / cues (Thoughts, images, mood, situation, behavior) that a crisis may be developing:  ? Thoughts: \"I don't matter\", \"People would be better off without me\", \"I'm a burden\" and \"I can't do this anymore\"  ? Images: denies  ? Thinking Processes: ruminations (can't stop thinking about my problems): and highly critical and negative thoughts  ? Mood: worsening depression, hopelessness and helplessness  ? Behaviors: isolating/withdrawing  and sleeping too much  ? Situations: \"Work is very stressful, my daughter doesn't speak to me, I don't know if I will have enough money to keep my condo and my parents are sick.\"   Step 2: Coping strategies - Things I can do to take my mind off of my problems without contacting another person (relaxation technique, physical activity):  ? Distress Tolerance Strategies:  listen to positive and upbeat music and watch a funny movie  ? Physical Activities: None  ? Focus on helpful thoughts:  \"This is temporary\", \"I will get through this\" and \"It always passes\"  Step 3: People and social settings that provide distraction:                 Name:  My friends                              ? going out to eat          Step 4: Remind myself of people and things that are important to me and worth living for:  \"my parents and my friends\"        Step 5: When I am in crisis, I can ask these people to help me use my safety plan:                 Name: Kavita Wooten (sister)             Phone: [phone]                                    Step 6: Making the environment safe:   ? remove alcohol, remove things I could use to hurt myself and be around others  Step 7: Professionals or agencies I can contact during a crisis:  ? Suicide Prevention Lifeline: Call or Text 119     [facility]: [facility]" Crisis line [phone]     Call 911 or go to my nearest emergency department.       I helped develop this safety plan and agree to use it when needed.  I have been given a copy of this plan.

## 2023-03-21 NOTE — PROGRESS NOTES
"RN Review of Medical History / Physical Health Screen  Outpatient Mental Health Programs - [facility]    PATIENT'S NAME: Nancy Montemayor  Preferred name: Nancy   She/Her/Hers/Herself 62 year old  MRN:   [id number]  :   1960  ACCT. NUMBER: [id number]  CURRENT AGE:  62 year old    DATE OF DIAGNOSTIC ASSESSMENT: 3/16/23  DATE OF ADMISSION: 3/22/23     Please see Diagnostic Assessment for additional Medical History.     General Health:   Have you had any exposure to any communicable disease in the past 2-3 weeks? no     Are you aware of safe sex practices? yes   Do you have a history of seizures?     If so, do you have a seizure plan? Known triggers?     Notify patient that we will call 911 (if virtual) or a code (if in-person), if we were to witness seizure during group. no    no  no    yes     Nutrition:    Are you on a special diet? If yes, please explain:  no   Do you have any concerns regarding your nutritional status? If yes, please explain:  no   Have you had any appetite changes in the last 3 months?  No     Have you had any weight loss or weight gain in the last 3 months?  No     Do you have a history of an eating disorder? no   Do you have a history of being in an eating disorder program? no         Height/Weight Review:  Patient reported height:  5'2\"      Patient reports weight:  Date last checked:  177lb          Patient height and weight recorded by RN in epic flowsheet: No; Unable to measure  Programmatic Care currently provided via telehealth. All pt weights and heights will be collected through patient self-report and recorded in physical health screening progress note upon admission to the program.      BMI Review:  Was the patient informed of BMI? no      Findings No Intervention         Fall Risk:   Have you had any falls in the past 3 months? no     Do you currently use any assistive devices for mobility?   no      Does the patient have " medication concerns? no     Was an MTM referral placed? no      Does the patient have any acute or chronic pain concerns that might impact participation in the program? yes fibromyalgia       Additional Comments/Assessment: saw PCP recently for physical    Per completion of the Medical History / Physical Health Screen, is there a recommendation to see / follow up with a primary care physician/clinic or dentist?    No.          Nidhi Matute RN  3/21/2023

## 2023-03-22 ENCOUNTER — HOSPITAL ENCOUNTER (OUTPATIENT)
Dept: BEHAVIORAL HEALTH | Facility: CLINIC | Age: 63
Discharge: HOME | End: 2023-03-22
Attending: PSYCHIATRY & NEUROLOGY
Payer: COMMERCIAL

## 2023-03-22 PROCEDURE — 90853 GROUP PSYCHOTHERAPY: CPT | Mod: GT,95

## 2023-03-22 NOTE — GROUP NOTE
Psychoeducation Group Note    PATIENT'S NAME: Nancy Montemayor  MRN:   [id number]  :   1960  ACCT. NUMBER: [id number]  DATE OF SERVICE: 3/22/23  START TIME:  9:00 AM  END TIME: 10:00 AM  FACILITATOR: Leeanne Maria LMFT; Vesta Silverio OTR/L  TOPIC:  Wellness Group: Health Maintenance  [facility] Day [facility]  TRACK: AIOP    NUMBER OF PARTICIPANTS: 4                                      Service Modality:  Video Visit     Telemedicine Visit: The patient's condition can be safely assessed and treated via synchronous audio and visual telemedicine encounter.      Reason for Telemedicine Visit: Patient has requested telehealth visit    Originating Site (Patient Location): Patient's home    Distant Site (Provider Location): Provider Remote Setting- Home Office    Consent:  The patient/guardian has verbally consented to: the potential risks and benefits of telemedicine (video visit) versus in person care; bill my insurance or make self-payment for services provided; and responsibility for payment of non-covered services.     Patient would like the video invitation sent by:  My Chart    Mode of Communication:  Video Conference via Medical Zoom    As the provider I attest to compliance with applicable laws and regulations related to telemedicine.             Summary of Group / Topics Discussed:  Health Maintenance: Discharge planning/Community resources: Patients worked on completing an instructor-facilitated discharge planning activity. Discharge planning begins for all patients after admission. Competent discharge planning promotes a successful transition and decreases the likelihood of mental health relapse. In this group, all dimensions of wellness were reviewed to assess for needs/discharge readiness. These dimensions included: physical, emotional, occupational/productivity, environmental, social, spiritual, intellectual, and financial. Patients worked on completing/updating their  discharge planning and identifying their treatment needs prior to time of discharge.     Patient Session Goals / Objectives:  ? Identified unmet treatment needs to accomplish before discharge  ? Completed all dimensions of the discharge planning packet  ? Participated in the planning process, make phone calls, set up appointments, got connected with community resources, followed up with treatment team as needed         Patient Participation / Response:  Fully participated with the group by sharing personal reflections / insights and openly received / provided feedback with other participants.    Demonstrated understanding of topics discussed through group discussion and participation, Identified / Expressed personal readiness to practice skills and Verbalized understanding of health maintenance topic    Treatment Plan:  Patient has a current master individualized treatment plan and today was our weekly review of the patient's progress.  See Epic treatment plan for progress / updates on goals and plan.    Leeanne Maria LMFT

## 2023-03-22 NOTE — PROGRESS NOTES
I have reviewed my treatment plan with my therapist on 3/22/2023  .   I agree with the plan as it is written in the electronic health record. (Admission IOP AM)    Name:      Signature:  Nancy Montemayor Unable to sign due to virtual, verbal permission given 3/22/2023, 9am.     Manjit Wlash MD  Psychiatrist/Medical Director Manjit Walsh MD on 3/22/2023 at 12:48 PM   DANETTE Serna  Psychotherapist DANETTE Brown on 3/22/2023 at 12:20 PM    Vesta Silverio MA, OTR/L  Vesta Silverio MA, OTR/L on March 22, 2023 at 10:57 AM

## 2023-03-22 NOTE — GROUP NOTE
Psychoeducation Group Note    PATIENT'S NAME: Nancy Montemayor  MRN:   [id number]  :   1960  ACCT. NUMBER: [id number]  DATE OF SERVICE: 3/22/23  START TIME: 10:00 AM  END TIME: 10:40 AM  FACILITATOR: Leeanne Maria LMFT; Vesta Silverio OTR/L  TOPIC:  Wellness Group: Health Maintenance  [facility]  TRACK: AIOP    NUMBER OF PARTICIPANTS: 4                                      Service Modality:  Video Visit     Telemedicine Visit: The patient's condition can be safely assessed and treated via synchronous audio and visual telemedicine encounter.      Reason for Telemedicine Visit: Patient has requested telehealth visit    Originating Site (Patient Location): Patient's home    Distant Site (Provider Location): Provider Remote Setting- Home Office    Consent:  The patient/guardian has verbally consented to: the potential risks and benefits of telemedicine (video visit) versus in person care; bill my insurance or make self-payment for services provided; and responsibility for payment of non-covered services.     Patient would like the video invitation sent by:  My Chart    Mode of Communication:  Video Conference via Medical Zoom    As the provider I attest to compliance with applicable laws and regulations related to telemedicine.       Summary of Group / Topics Discussed:  Health Maintenance: Discharge planning/Community resources: Patients worked on completing an instructor-facilitated discharge planning activity. Discharge planning begins for all patients after admission. Competent discharge planning promotes a successful transition and decreases the likelihood of mental health relapse. In this group, all dimensions of wellness were reviewed to assess for needs/discharge readiness. These dimensions included: physical, emotional, occupational/productivity, environmental, social, spiritual, intellectual, and financial. Patients worked on completing/updating their  discharge planning and identifying their treatment needs prior to time of discharge.     Patient Session Goals / Objectives:  ? Identified unmet treatment needs to accomplish before discharge  ? Completed all dimensions of the discharge planning packet  ? Participated in the planning process, make phone calls, set up appointments, got connected with community resources, followed up with treatment team as needed         Patient Participation / Response:  Fully participated with the group by sharing personal reflections / insights and openly received / provided feedback with other participants.    Demonstrated understanding of topics discussed through group discussion and participation, Identified / Expressed personal readiness to practice skills and Verbalized understanding of health maintenance topic    Treatment Plan:  Patient has a current master individualized treatment plan and today was our weekly review of the patient's progress.  See Epic treatment plan for progress / updates on goals and plan.    Leeanne Maria LMFT

## 2023-03-22 NOTE — PROGRESS NOTES
55+ Treatment Program:  Individualized Treatment Plan     Date of Plan: 3/22/2023    Name: Nancy Montemayor MRN: [id number]    : 1960     Program: 55+ Outpatient Program    Clinical Track: aiop-am (team Kristie Aguilera, [facility], Leeanne Maria, LMFT, Domenica Mckeon, [facility], Evelio Cedeno RN, Vesta Silverio MA, OTR/L, Rebecca Cleveland, [facility], BC-DMT, Christie Alvares [facility])    DSM5 Diagnosis:  296.32 (F33.1) Major Depressive Disorder, Recurrent Episode, Moderate    55+ Multidisciplinary Team Members:  Dr. Manjit Walsh, Thea Matute RN; Anisah Jonas [facility]; [facility]; Maggie Sandoval LMFT; ROBERT Steinberg, [facility]; Kirt Mo NP  Nancy Montemayor will participate in the 55+ Program 3 days per week, 3 hours per day.   Anticipated duration/discharge: 12 weeks    Due to COVID-19, services will be delivered via telemedicine until further notice.     Program Start Date: 3/22/2023  Anticipated Discharge Date: 2023 (pending authorization/clinical change    Review Date: Does Nancy Montemayor continue to meet criteria to participate in the 55+ Program, 3 days per week; 3 hours per day?   3/22/2023 yes   3/29/2023 Yes-ROBERT Alegre LICSW (SP)   2023 staffing Yes - Manjit Walsh MD on 2023 at 8:16 AM   5/3/2023 staffing Yes - Manjit Walsh MD on 5/3/2023 at 8:12 AM   2023 Yes-Nancy will be transfering to the  track with the in person transition beginning Friday, May 11.    2023 staffing Yes - Manjit Walsh MD on 2023 at 8:19 AM   2023 Yes-ROBERT Alegre LICSW   2023 no Patient completed group.     Client Strengths:   friends / good social support, open to learning, support of family, friends and providers, wants to learn, willing to ask questions and willing to relate to others    Client Participation in Plan:  Contributed to goals and plan   Attended individual treatment plan meeting on 3/22/2023  Received copy of treatment plan     Areas of  "Vulnerability:  Depressive symptoms     Long-Term Goals:  Knowledge about illness and management of symptoms   Maintenance of personal safety    Abuse Prevention Plan:  Safe, therapeutic environment   Safety coping plan as needed   Education regarding illness and skill development   Coordination with care providers     Discharge Criteria:  Satisfactory progress toward treatment goals   Has a discharge plan in place   Regular attendance as scheduled     Areas of Treatment Focus       Why are you seeking treatment/What do you want to focus on during treatment?per DA: The reason for seeking services at this time is: \"Depression\".       Area of Treatment Focus:   Personal Safety  Start Date:    3/22/2023    Safety  Patient denied a history of homicidal ideation.     Patient denied a history of personal safety concerns.    Patient denied a history of assaultive behaviors.    Patient denied a history of sexual assault behaviors.     Patient denied a history of risk behaviors associated with substance use.  Patient denies any history of high risk behaviors associated with mental health symptoms.  Patient reports there are not firearms in the house  Patient reports the following protective factors: dedication to family or friends  3/14/2023 per DA (review chart for most current):   Calculated C-SSRS Risk Score (Lifetime/Recent) Moderate Risk          Goal:  Target Date: 5/18/2023, 6/13/23 Status: Completed  Client will notify staff when needing assistance to develop or implement a coping plan to manage suicidal or self injurious urges.  Client will use coping plan for safety, as needed.    Progress:  3/22/2023: Met with care team. Set and discussed goals. Progress notes will be updated during treatment days to reflect current safety status. Pt was agreeable to goal when described. Continue.      5/23/2023 Pt denies S/I or safety issues. SP    Treatment Strategies:   Assist clients in establishing / strengthening support " network  Assess / reassess level of potential for harm to self or others  Engage in safety planning when indicated  Facilitate increased self awareness      Area of Treatment Focus:   Symptom Stabilization and Management  Start Date:    3/22/2023    Symptoms  Per DA:  Patient reports the following functional impairments:  relationship(s), self-care and work / vocational responsibilities.  Depression:     Change in sleep, Lack of interest, Change in energy level, Difficulties concentrating, Suicidal ideation, Feelings of hopelessness, Feelings of helplessness, Low self-worth, Ruminations, Feeling sad, down, or depressed and Withdrawn  Anxiety:           Excessive worry  ADD / ADHD:              Inattentive, Difficulties listening, Poor task completion, Distractibility and Interrupts         Goal:  Target Date: 5/18/2023, 6/13/2023 Status: Completed  Will report on symptoms and identify 1-3 skills to use to manage mental health, e.g. Practice skills to improve self-esteem and challenge negative self-judgments and Practice interpersonal skills to improve ability to assert needs and set boundaries      Progress:   3/22/2023: Met with team members. Discussed program, process, and progress. Discussed and set treatment goals. I need to love myself, and I dont. Set boundaries, maybe, that's one I can work on. Continue.       5/23/2023  Nancy is aware of her pattern of mood symptoms, coping skills for symptom management and has enrolled her support system into her mental health recovery. She is focusing on lifestyle balance. She is being assertive with needs and able to set boundaries with others. She continues to monitor and reframe self talk to being more positive.  She reached out to the  at [employer] and will plan to transition to two times per week in July in a different position. She has also been finding ways to have more purpose and meaning in her life. She continues to be supportive to her aging  "parents. Her mother is in a nursing home. Nancy is identifying ways to self care.    She continues to focus on sleep hygiene. SP    Treatment Strategies:   Assess / reassess for appropriate therapy program involvement, encourage participation in therapies  Facilitate increased self awareness  Provide education regarding symptoms management  Teach adaptive coping skills and communication skills      Area of Treatment Focus:   Community Resources / Support and Discharge Planning  Start Date:    3/22/2023    Professional Supports    [place] Primary Care Provider: Dr. Cosme  Therapist: Madhuri Kahn      Goal:  Target Date: 5/18/2023, discharge Status: Completed  Will develop an aftercare / transition plan by discharge, engaging in at least 1-2 behaviors supporting wellness and/or connection, e.g. continue with supports, decrease daytime sleeping (naps), keeping up with home tasks,etc.    Progress:  3/22/2023: Met with care team. Discussed and set goals. Pt was given resources (e.g. Psychology Today, [employer], [employer], etc) in [facility] discharge planning group. Regarding professional supports, \"I don't have a psychiatrist. I just started with my individual therapist.\" Nancy would like a referral for psychiatry. Regarding personal supports, \"I have four really good girlfriends. I have my sister, two sisters, that are there for me. My friends check on me all the time.\" Regarding wellness, \"I need to work on my nutrition. I'm overweight and I'd feel better if I looked and felt better. I feel anxious in a gym like everyone is looking at me. I'm on medical leave [from work]. I had to be to work at 5 and I worked and then took a nap. It was work and sleep. I sleep for 16 hours. I go to bed at night fine. I'm trying not to nap.\" Regarding responsibilities and obligations, \"It feels overwhelming. I buy healthy food and I don't have the energy to cook and clean up from it. I just  food that isn't good for me. Keeping " "the laundry and dishes going... I need clean underwear so I need to do it. I'm not going back to the same job.\" Continue.    5/23/2023 Nancy has been focusing on finding balance between productivity, task management and self care. She continues to work on projects at home.   She continues to consult with Dr. Cosme for medication management. She has transitioned to in person IOP and finds it helpful. She will transition from the group with a tentative discharge of 6/13. She plans to go to [place] in a supportive role then will transition back to [place] part time. Nancy uses the support of many [place] of friends. Nancy is interested in the 55+ Aftercare Clinic beginning July 6. She consults with therapist, Madhuri Kahn.SP    Treatment Strategies:   Assist clients in establishing / strengthening support network  Assist with discharge planning  Facilitate increased self awareness  Provide education regarding resources       Vesta Silverio MA, OTR/L 3/22/2023      NOTE: Signatures are available on the Acknowledgement of Treatment Plan located in Chart Review    The Individualized Treatment Plan Signature Page has been routed to the provider for co-sign.    I have reviewed the patient's Individualized Treatment Plan and agree with the current goals, interventions and level of care.     Manjit Walsh MD  3/22/2023, 4/5/2023, 5/3/2023, 5/16/2023, 5/24/2023    ROBERT Alegre, LICSW 3/29/2023 16:00; 5/23/2023 16:30; 5/24/2023 12:00    Domenica Mckeon JANINA on 6/15/2023 at 10:00 AM    "

## 2023-03-22 NOTE — GROUP NOTE
Process Group Note    PATIENT'S NAME: Nancy Montemayor  MRN:   [id number]  :   1960  ACCT. NUMBER: [id number]  DATE OF SERVICE: 3/22/23  START TIME: 11:00 AM  END TIME: 11:50 AM  FACILITATOR: Leeanne Maria LMFT  TOPIC:  Process Group    Diagnoses:  Principal DSM5 Diagnoses  (Sustained by DSM5 Criteria Listed Above):   296.32 (F33.1) Major Depressive Disorder, Recurrent Episode, Moderate        [facility] Day Treatment  TRACK: AIOP    NUMBER OF PARTICIPANTS: 4                                      Service Modality:  Video Visit     Telemedicine Visit: The patient's condition can be safely assessed and treated via synchronous audio and visual telemedicine encounter.      Reason for Telemedicine Visit: Patient has requested telehealth visit    Originating Site (Patient Location): Patient's home    Distant Site (Provider Location): Provider Remote Setting- Home Office    Consent:  The patient/guardian has verbally consented to: the potential risks and benefits of telemedicine (video visit) versus in person care; bill my insurance or make self-payment for services provided; and responsibility for payment of non-covered services.     Patient would like the video invitation sent by:  My Chart    Mode of Communication:  Video Conference via Medical Zoom    As the provider I attest to compliance with applicable laws and regulations related to telemedicine.                                  Data:    Session content: At the start of this group, patients were invited to check in by identifying themselves, describing their current emotional status, and identifying issues to address in this group.   Area(s) of treatment focus addressed in this session included Symptom Management, Personal Safety, Develop / Improve Independent Living Skills and Develop Socialization / Interpersonal Relationship Skills.    Patient reported tired due to a new medication. Patient reported she has been  experiencing insomnia the last few days. Patient stated she also has fibromyalgia and is on medication for chronic pain and is in pain today. Patient stated her goal for today is to do more laundry and take a nap. Patient stated barriers are not feeling like doing the laundry or go to dinner with a friend. Patient stated skills are opposite to emotion. Patient endorsed suicidal thoughts in the past but nothing currently. Patient endorsed cannabis use at night for calming and for chronic pain. Patient denied alcohol use. Patient reported feeling proud of herself for joining group and going with her friends when they told her she needed to go to the hospital. Patient reported feeling grateful for her friends. Patient utilized processing time to talk about her mother who was recently placed in a nursing home.     Therapeutic Interventions/Treatment Strategies:  Psychotherapist offered support, feedback and validation and reinforced use of skills. Treatment modalities used include Motivational Interviewing, Cognitive Behavioral Therapy and Dialectical Behavioral Therapy. Interventions include Behavioral Activation: Reinforced benefits/challenges of change process through applying skills to replace unwanted behaviors, Explored how behaviors effect mood and interact with thoughts and feelings and Encouraged strategies to reduce individual procrastination and increase motivation by increasing goal-directed activities to enhance mood and reduce symptoms., Cognitive Restructuring:  Assisted patient in formulating new neutral/positive alternatives to challenge less helpful / ineffective thoughts and Emotions Management:  Reinforced the purpose and biological basis of emotions, Discussed barriers to emotional regulation, Reviewed opposite action skill and Increased awareness of daily mood patterns/changes.    Assessment:    Patient response:   Patient responded to session by accepting feedback, giving feedback, listening,  focusing on goals, being attentive and accepting support    Possible barriers to participation / learning include: and no barriers identified    Health Issues:   Yes: Pain, Associated Psychological Distress  Sleep disturbance, Associated Psychological Distress       Substance Use Review:   Substance Use: cannabis .     Mental Status/Behavioral Observations  Appearance:   Appropriate   Eye Contact:   Good   Psychomotor Behavior: Normal   Attitude:   Cooperative   Orientation:   All  Speech   Rate / Production: Normal    Volume:  Normal   Mood:    Anxious  Depressed   Affect:    Appropriate   Thought Content:   Clear  Thought Form:  Coherent  Logical     Insight:    Good     Plan:     Safety Plan: No current safety concerns identified.  Recommended that patient call 911 or go to the local ED should there be a change in any of these risk factors.     Barriers to treatment: None identified    Patient Contracts (see media tab):  None    Substance Use: Not addressed in session     Continue or Discharge: Patient will continue in 55+ Program (55+) as planned. Patient is likely to benefit from learning and using skills as they work toward the goals identified in their treatment plan.      DANETTE Brown  March 22, 2023

## 2023-03-23 ENCOUNTER — HOSPITAL ENCOUNTER (OUTPATIENT)
Dept: BEHAVIORAL HEALTH | Facility: CLINIC | Age: 63
Discharge: HOME | End: 2023-03-23
Attending: PSYCHIATRY & NEUROLOGY
Payer: COMMERCIAL

## 2023-03-23 DIAGNOSIS — F33.1 MODERATE EPISODE OF RECURRENT MAJOR DEPRESSIVE DISORDER (H): ICD-10-CM

## 2023-03-23 PROCEDURE — 90853 GROUP PSYCHOTHERAPY: CPT | Mod: GT,95 | Performed by: SOCIAL WORKER

## 2023-03-23 PROCEDURE — 90853 GROUP PSYCHOTHERAPY: CPT | Mod: GT,95

## 2023-03-23 PROCEDURE — 99215 OFFICE O/P EST HI 40 MIN: CPT | Mod: VID

## 2023-03-23 RX ORDER — FAMOTIDINE 20 MG
TABLET ORAL
COMMUNITY
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
## 2023-03-23 NOTE — GROUP NOTE
Psychoeducation Group Note    PATIENT'S NAME: Nancy Montemayor  MRN:   [id number]  :   1960  ACCT. NUMBER: [id number]  DATE OF SERVICE: 3/23/23  START TIME:  9:00 AM  END TIME:  9:50 AM  FACILITATOR: Nancy Martins LICSW  TOPIC: MH Wellness Group: Health Maintenance  Service Modality:  Video Visit     Telemedicine Visit: The patient's condition can be safely assessed and treated via synchronous audio and visual telemedicine encounter.       Reason for Telemedicine Visit: Services only offered telehealth and due to COVID-19.     Originating Site (Patient Location): Patient's home     Distant Site (Provider Location): Provider Remote Setting- Home Office     Consent:  The patient/guardian has verbally consented to: the potential risks and benefits of telemedicine (video visit) versus in person care; bill my insurance or make self-payment for services provided; and responsibility for payment of non-covered services.      Patient would like the video invitation sent by:  My Chart     Mode of Communication:  Video Conference via Medical Zoom     As the provider I attest to compliance with applicable laws and regulations related to telemedicine.    [facility] Adult Mental Health Day Treatment  TRACK: A-IOP    NUMBER OF PARTICIPANTS: 5    Summary of Group / Topics Discussed:  Health Maintenance: Weekend planning: Patients were given time to complete a weekend plan of what they will do to promote wellness and sobriety over the weekend when they do not have the structure of group. Patients were encouraged to review progress on their treatment goals and were challenged to identify ways to work toward meeting them. Patients identified and discussed foreseeable barriers to success over the weekend and then developed a plan to overcome them. Patients reviewed their distress coping skills and social support network and discussed this with the group.       Patient Session Goals / Objectives:    ?    Identified  activities to engage in that promote balance in wellness  ?    Distinguished possible barriers to success over the weekend and created a plan to overcome them  ?    Listed distress coping skills and identified social support network to utilize if in crisis during the weekend        Patient Participation / Response:  Fully participated with the group by sharing personal reflections / insights and openly received / provided feedback with other participants.    Demonstrated understanding of topics discussed through group discussion and participation and Verbalized understanding of health maintenance topic    Treatment Plan:  Patient has a current master individualized treatment plan.  See Epic treatment plan for more information.    Nancy Martins, LICSW

## 2023-03-23 NOTE — GROUP NOTE
Process Group Note    PATIENT'S NAME: Nancy Montemayor  MRN:   [id number]  :   1960  ACCT. NUMBER: [id number]  DATE OF SERVICE: 3/23/23  START TIME: 11:00 AM  END TIME: 11:50 AM  FACILITATOR: Leeanne Maria LMFT  TOPIC:  Process Group    Diagnoses:  Principal DSM5 Diagnoses  (Sustained by DSM5 Criteria Listed Above):   296.32 (F33.1) Major Depressive Disorder, Recurrent Episode, Moderate        [facility] Day Treatment  TRACK: AIOP    NUMBER OF PARTICIPANTS: 5                                      Service Modality:  Video Visit     Telemedicine Visit: The patient's condition can be safely assessed and treated via synchronous audio and visual telemedicine encounter.      Reason for Telemedicine Visit: Patient has requested telehealth visit    Originating Site (Patient Location): Patient's home    Distant Site (Provider Location): Provider Remote Setting- Home Office    Consent:  The patient/guardian has verbally consented to: the potential risks and benefits of telemedicine (video visit) versus in person care; bill my insurance or make self-payment for services provided; and responsibility for payment of non-covered services.     Patient would like the video invitation sent by:  My Chart    Mode of Communication:  Video Conference via Medical Zoom    As the provider I attest to compliance with applicable laws and regulations related to telemedicine.                                  Data:    Session content: At the start of this group, patients were invited to check in by identifying themselves, describing their current emotional status, and identifying issues to address in this group.   Area(s) of treatment focus addressed in this session included Symptom Management, Personal Safety, Develop / Improve Independent Living Skills and Develop Socialization / Interpersonal Relationship Skills.    Patient reported feeling anxious and tired today. Patient reported her goal for today is  to eat lunch. Patient stated yesterday she ate an entire meal. Patinet stated barriers are feeling overwhelmed by going to the store. Patient stated skills are to go to [employer] and get a meal. Patient stated another skill is not to feed the slowly growing agoraphobia. Patient stated another skill is to view food as fuel. Patient endorsed passive suicidal thoughts. Patient endorsed cannabis use. Patient reported feeling grateful for group.       Therapeutic Interventions/Treatment Strategies:  Psychotherapist offered support, feedback and validation and reinforced use of skills. Treatment modalities used include Motivational Interviewing, Cognitive Behavioral Therapy and Dialectical Behavioral Therapy. Interventions include Cognitive Restructuring:  Assisted patient in formulating new neutral/positive alternatives to challenge less helpful / ineffective thoughts, Emotions Management:  Reinforced the purpose and biological basis of emotions, Discussed barriers to emotional regulation and Increased awareness of daily mood patterns/changes and Relationship Skills: Encouraged development and maintenance  of healthy boundaries.    Assessment:    Patient response:   Patient responded to session by accepting feedback, listening, focusing on goals, being attentive and accepting support    Possible barriers to participation / learning include: and no barriers identified    Health Issues:   Yes: Pain, Associated Psychological Distress       Substance Use Review:   Substance Use: cannabis .     Mental Status/Behavioral Observations  Appearance:   Appropriate   Eye Contact:   Good   Psychomotor Behavior: Normal   Attitude:   Cooperative   Orientation:   All  Speech   Rate / Production: Normal    Volume:  Normal   Mood:    Normal  Affect:    Appropriate   Thought Content:   Clear  Thought Form:  Coherent  Logical     Insight:    Good     Plan:     Safety Plan: No current safety concerns identified.  Recommended that patient call  911 or go to the local ED should there be a change in any of these risk factors.     Barriers to treatment: None identified    Patient Contracts (see media tab):  None    Substance Use: Not addressed in session     Continue or Discharge: Patient will continue in Adult Day Treatment (ADT)  as planned. Patient is likely to benefit from learning and using skills as they work toward the goals identified in their treatment plan.      Leeanne Maria, DANETTE  March 23, 2023

## 2023-03-27 ENCOUNTER — HOSPITAL ENCOUNTER (OUTPATIENT)
Dept: BEHAVIORAL HEALTH | Facility: CLINIC | Age: 63
Discharge: HOME | End: 2023-03-27
Attending: PSYCHIATRY & NEUROLOGY
Payer: COMMERCIAL

## 2023-03-27 DIAGNOSIS — F33.1 MODERATE EPISODE OF RECURRENT MAJOR DEPRESSIVE DISORDER (H): Primary | ICD-10-CM

## 2023-03-27 PROCEDURE — 90853 GROUP PSYCHOTHERAPY: CPT | Mod: GT,95

## 2023-03-27 NOTE — GROUP NOTE
Psychotherapy Group Note    PATIENT'S NAME: Nancy Montemayor  MRN:   [id number]  :   1960  ACCT. NUMBER: [id number]  DATE OF SERVICE: 3/27/23  START TIME:  9:00 AM  END TIME:  9:50 AM  FACILITATOR: Anisha Jonas LICSW  TOPIC: MH EBP Group: Specialty Awareness  [facility] Adult Mental Health Day Treatment  TRACK: admission track                                    Service Modality:  Video Visit     Telemedicine Visit: The patient's condition can be safely assessed and treated via synchronous audio and visual telemedicine encounter.      Reason for Telemedicine Visit: Services only offered telehealth    Originating Site (Patient Location): Patient's home    [place] Site (Provider Location[place] [facility] [place] [facility] [place]     Consent:  The patient/guardian has verbally consented to: the potential risks and benefits of telemedicine (video visit) versus in person care; bill my insurance or make self-payment for services provided; and responsibility for payment of non-covered services.     Patient would like the video invitation sent by:  My Chart    Mode of Communication:  Video Conference via Medical Zoom    As the provider I attest to compliance with applicable laws and regulations related to telemedicine.         NUMBER OF PARTICIPANTS: 5    Summary of Group / Topics Discussed:  The purpose of this specialty topic is to help patients identify the?rules and expectations of the intensive outpatient program.?The focus will be on helping patients?to better understand how?to share and process emotions, discuss sensitive topics, regulate emotions during group sessions, and manage attendance expectations.???     Discuss program Welcome Letter?(including rules and expectations)     Discuss group-appropriate behaviors versus therapy-interfering behaviors ?     Identify strategies to share and regulate emotions during programming     Patient Session Goals / Objectives:  Patient?will:?  Left generic message for patient to call us back no name was said on the message to clarify continue of care.     Verbalize understanding of?group rules and expectations     Verbalize understanding of?appropriate behaviors in group     Verbalize understanding of how to process emotions in group settings?                                       Service Modality:  Video Visit     Telemedicine Visit: The patient's condition can be safely assessed and treated via synchronous audio and visual telemedicine encounter.      Reason for Telemedicine Visit: Services only offered telehealth    Originating Site (Patient Location): Patient's home    Distant Site (Provider Location): Provider Remote Setting- Home Office    Consent:  The patient/guardian has verbally consented to: the potential risks and benefits of telemedicine (video visit) versus in person care; bill my insurance or make self-payment for services provided; and responsibility for payment of non-covered services.     Patient would like the video invitation sent by:  My Chart    Mode of Communication:  Video Conference via Medical Zoom    As the provider I attest to compliance with applicable laws and regulations related to telemedicine.           Patient Participation / Response:  Fully participated with the group by sharing personal reflections / insights and openly received / provided feedback with other participants.    Demonstrated understanding of topics discussed through group discussion and participation and Identified / Expressed readiness to act on skill suggestions discussed in topic    Treatment Plan:  Patient has an initial individualized treatment plan that was created as part of their diagnostic assessment / admission process.  A master individualized treatment plan is in the process of being developed with the patient and multi-disciplinary care team.    FRANCESCA DuboisSW

## 2023-03-27 NOTE — GROUP NOTE
"Psychotherapy Group Note    PATIENT'S NAME: Nancy Montemayor  MRN:   [id number]  :   1960  ACCT. NUMBER: [id number]  DATE OF SERVICE: 3/27/23  START TIME: 10:00 AM  END TIME: 10:50 AM  FACILITATOR: Tim Hutchinson  TOPIC: MH EBP Group: Enhanced Mindfulness  [facility] Adult Mental Health Day Treatment  TRACK: AM Admission IOP    NUMBER OF PARTICIPANTS: 5                                      Service Modality:  Video Visit     Telemedicine Visit: The patient's condition can be safely assessed and treated via synchronous audio and visual telemedicine encounter.      Reason for Telemedicine Visit: Services only offered telehealth    Originating Site (Patient Location): Patient's home    Distant Site (Provider Location): Provider Remote Setting- Home Office    Consent:  The patient/guardian has verbally consented to: the potential risks and benefits of telemedicine (video visit) versus in person care; bill my insurance or make self-payment for services provided; and responsibility for payment of non-covered services.     Patient would like the video invitation sent by:  My Chart    Mode of Communication:  Video Conference via Medical Zoom    As the provider I attest to compliance with applicable laws and regulations related to telemedicine.     Summary of Group / Topics Discussed:  Using \"nature\" topic, client explored the benefits of mindfulness practice through the lens of \"Hawkins Bathing\".  Patients received information on the main components of mindfulness. Patients participated in discussion on how to practice observing, describing, and participating in internal and external environments. Relevance of mindfulness skills to overall mental and physical health was explored.  Patients explored and discussed in group their current awareness and knowledge of mindfulness skills as well as barriers to applying skills.    Patient Session Goals / Objectives:    Demonstrated and verbalized understanding of key " "mindfulness concepts within context of \"forest bathing\"/immersing one's self mindfully in a natural setting.    Identified benefits of forest bathing/mindful engagement with nature    Resolved barriers to participating in activities in nature regluarly    Identified local sources to visit to spend time in/with nature    Explored how to bring nature home or indoors    Identified plan to use mindfulness skills in daily life       Patient Participation / Response:  Fully participated with the group by sharing personal reflections / insights and openly received / provided feedback with other participants.    Demonstrated understanding of topics discussed through group discussion and participation    Treatment Plan:  Patient has an initial individualized treatment plan that was created as part of their diagnostic assessment / admission process.  A master individualized treatment plan is in the process of being developed with the patient and multi-disciplinary care team.    Tim Hutchinson , LPCC, LADC        "

## 2023-03-27 NOTE — GROUP NOTE
Process Group Note    PATIENT'S NAME: Nancy Montemayor  MRN:   [id number]  :   1960  ACCT. NUMBER: [id number]  DATE OF SERVICE: 3/27/23  START TIME: 11:00 AM  END TIME: 11:50 AM  FACILITATOR: Leeanne Maria LMFT  TOPIC:  Process Group    Diagnoses:  Principal DSM5 Diagnoses  (Sustained by DSM5 Criteria Listed Above):   296.32 (F33.1) Major Depressive Disorder, Recurrent Episode, Moderate        [facility] Day Treatment  TRACK: AIOP    NUMBER OF PARTICIPANTS: 6                                      Service Modality:  Video Visit     Telemedicine Visit: The patient's condition can be safely assessed and treated via synchronous audio and visual telemedicine encounter.      Reason for Telemedicine Visit: Patient has requested telehealth visit    Originating Site (Patient Location): Patient's home    Distant Site (Provider Location): Provider Remote Setting- Home Office    Consent:  The patient/guardian has verbally consented to: the potential risks and benefits of telemedicine (video visit) versus in person care; bill my insurance or make self-payment for services provided; and responsibility for payment of non-covered services.     Patient would like the video invitation sent by:  My Chart    Mode of Communication:  Video Conference via Medical Zoom    As the provider I attest to compliance with applicable laws and regulations related to telemedicine.                                  Data:    Session content: At the start of this group, patients were invited to check in by identifying themselves, describing their current emotional status, and identifying issues to address in this group.   Area(s) of treatment focus addressed in this session included Symptom Management, Personal Safety, Develop / Improve Independent Living Skills and Develop Socialization / Interpersonal Relationship Skills.    Patient reported feeling encouraged today. Patient stated during the second hour they  talked about nature and being outside and made a plan to spend more time outside. Patient stated her goal for today is to clean her house. Patient also reported planning to have dinner with her dad tonight. Patient stated barriers for cleaning are distraction. Patient reported skills are to pair a preferred task with music or television and utilize the pomodoro method. Patient denied safety concerns. Patient endorsed passive suicidal thoughts. Patient endorsed cannabis use. Patient reported feeling proud of herself for calling a friend on Saturday and going out to lunch.     Therapeutic Interventions/Treatment Strategies:  Psychotherapist offered support, feedback and validation and reinforced use of skills. Treatment modalities used include Motivational Interviewing, Cognitive Behavioral Therapy and Dialectical Behavioral Therapy. Interventions include Behavioral Activation: Explored how behaviors effect mood and interact with thoughts and feelings and Emotions Management:  Reinforced the purpose and biological basis of emotions, Discussed barriers to emotional regulation, Reviewed opposite action skill and Increased awareness of daily mood patterns/changes.    Assessment:    Patient response:   Patient responded to session by accepting feedback, listening, focusing on goals, being attentive and accepting support    Possible barriers to participation / learning include: and no barriers identified    Health Issues:   Yes: Pain, Associated Psychological Distress       Substance Use Review:   Substance Use: cannabis .     Mental Status/Behavioral Observations  Appearance:   Appropriate   Eye Contact:   Good   Psychomotor Behavior: Normal   Attitude:   Cooperative   Orientation:   All  Speech   Rate / Production: Normal    Volume:  Normal   Mood:    Anxious   Affect:    Appropriate   Thought Content:   Clear  Thought Form:  Coherent  Logical     Insight:    Good     Plan:     Safety Plan: No current safety concerns  identified.  Recommended that patient call 911 or go to the local ED should there be a change in any of these risk factors.     Barriers to treatment: None identified    Patient Contracts (see media tab):  None    Substance Use: Not addressed in session     Continue or Discharge: Patient will continue in 55+ Program (55+) as planned. Patient is likely to benefit from learning and using skills as they work toward the goals identified in their treatment plan.      Leeanne Maria, LMFT  March 27, 2023

## 2023-03-28 ENCOUNTER — TELEPHONE (OUTPATIENT)
Dept: BEHAVIORAL HEALTH | Facility: CLINIC | Age: 63
End: 2023-03-28
Payer: COMMERCIAL

## 2023-03-28 ENCOUNTER — HOSPITAL ENCOUNTER (OUTPATIENT)
Dept: BEHAVIORAL HEALTH | Facility: CLINIC | Age: 63
Discharge: HOME | End: 2023-03-28
Attending: PSYCHIATRY & NEUROLOGY
Payer: COMMERCIAL

## 2023-03-28 DIAGNOSIS — F33.1 MODERATE EPISODE OF RECURRENT MAJOR DEPRESSIVE DISORDER (H): Primary | ICD-10-CM

## 2023-03-28 PROCEDURE — 90853 GROUP PSYCHOTHERAPY: CPT | Mod: GT,95 | Performed by: COUNSELOR

## 2023-03-28 PROCEDURE — 90853 GROUP PSYCHOTHERAPY: CPT | Mod: GT,95 | Performed by: PSYCHOLOGIST

## 2023-03-28 NOTE — GROUP NOTE
Psychoeducation Group Note                                    Service Modality:  Video Visit     Telemedicine Visit: The patient's condition can be safely assessed and treated via synchronous audio and visual telemedicine encounter.      Reason for Telemedicine Visit: Patient has requested telehealth visit, Patient unable to travel, Patient convenience (e.g. access to timely appointments / distance to available provider), Patient lives in a designated Health Professional Shortage Area (HPSA), and Services only offered telehealth    Originating Site (Patient Location): Patient's home    Distant Site (Provider Location): [facility]: [facility]    Consent:  The patient/guardian has verbally consented to: the potential risks and benefits of telemedicine (video visit) versus in person care; bill my insurance or make self-payment for services provided; and responsibility for payment of non-covered services.     Patient would like the video invitation sent by:  My Chart    Mode of Communication:  Video Conference via Medical Zoom    As the provider I attest to compliance with applicable laws and regulations related to telemedicine.        PATIENT'S NAME: Nancy Montemayor  MRN:   [id number]  :   1960  ACCT. NUMBER: [id number]  DATE OF SERVICE: 3/28/23  START TIME:  9:00 AM  END TIME:  9:50 AM  FACILITATOR: Johana Oh PsyD; Vesta Silverio OTR/L  TOPIC:  Life Skills Group: Life Skills  [facility] Adult Mental Health Day Treatment  TRACK: AIOP am    NUMBER OF PARTICIPANTS: 8    Summary of Group / Topics Discussed:  Life Skills:  Grounding    Patient Session Goals / Objectives:  ? The client will practice deep breathing techniques with the group and describe how it felt.  ? The client will discuss grounding skills with movement and exercise.  ? The client will discuss the TIPP method and discuss different ways to use the TIPP and how it helps to lower distress.          Patient  Participation / Response:  Fully participated with the group by sharing personal reflections / insights and openly received / provided feedback with other participants.    Verbalized understanding of content and Patient would benefit from additional opportunities to practice the content to be able to generalize it to their everyday life with increased intentionality, consistency, and efficacy in support of their psychiatric recovery  The client participated in a group that discussed deep breathing, anxiety symptoms that they felt in their body and how they managed distress.  The client listened to 5-4-3-2-1 and senses and discussed them with others  Treatment Plan:  Patient has a current master individualized treatment plan.  See Epic treatment plan for more information.    Trinidad Carrington Psy., D,  Licensed Clinical Psychologist

## 2023-03-28 NOTE — TELEPHONE ENCOUNTER
"----- Message from DANETTE Brown sent at 3/27/2023  2:50 PM CDT -----  Regarding: AIOP Transfer to A1  Scheduling Request    Patient Name: Nancy Montemayor  Location of programming: A1 55+  Start Date:   Group: [facility] on Tuesday, Wednesday, and Friday at 9:00 AM to 12:00 PM  Attending Provider (MD): Dr. Walsh  Number of visits to be scheduled: 32  Duration of Appointment in minutes: 180  Visit Type: Zoom - 2657    Additional notes: Patient is working on home management and getting out more with friends. Has a good support network and is a good group participant. Last day in [facility] is 3/28/23.       Admission SBAR NOTE  Adult  Outpatient Programs              SITUATION:      Admission Date: 3/21/2023     Provider verified identity through the following two step process.  Patient provided: verbal spelling of full first and last name and Patient      Patient name:            Nancy Montemayor  Preferred name: Nancy She/Her/Hers/Herself 62 year old  Diagnosis/-es (copy from DA, including ICD-10):   Principal DSM5 Diagnoses  (Sustained by DSM5 Criteria Listed Above):   296.32 (F33.1) Major Depressive Disorder, Recurrent Episode, Moderate _.         Assigned Program/Track: AIOP     Reviewed patient's schedule and informed them of any variation due to holidays. yes     Does the patient have any planned absences and/or barriers to admission/treatment? no  NOTE: impact of transportation, technology, childcare, work, or housing concerns.     Insurance: Payor: MEDICA / Plan: MEDICA CHOICE / Product Type: Indemnity /  Changes/Concerns: no     Does patient need an appointment with the program provider? Yes- appt with Kirt 3/22 at 10am  NOTE: If yes, please confirm/schedule provider visit.        BACKGROUND:      Patient's stated goal/reason for treatment (copy from [employer]; confirm with patient): \"Depression\".            ASSESSMENT:      Please consult  if any of the following concerns " may impact admission/participation in program:      PHQ, RAMAN and PROMIS done within 7 days OR send upon admission if over 7 days. ( Last done 3/16/23- sent new assessments via [employer] 3/21/23)     [place] Suicide Severity Rating (select Lifetime/Recent):   [place] Suicide Severity Rating Scale (Lifetime/Recent)  [place] Suicide Severity Rating (Lifetime/Recent) 3/14/2023  Q1 Wished to be Dead (Past Month) yes  Q2 Suicidal Thoughts (Past Month) yes  Q3 Suicidal Thought Method yes  Q4 Suicidal Intent without Specific Plan no  Q5 Suicide Intent with Specific Plan yes  Q6 Suicide Behavior (Lifetime) no  Level of Risk per Screen high risk  1. Wish to be Dead (Lifetime) 1  1. Wish to be Dead (Past 1 Month) 1  2. Non-Specific Active Suicidal Thoughts (Lifetime) 1  2. Non-Specific Active Suicidal Thoughts (Past 1 Month) 1  3. Active Suicidal Ideation with any Methods (Not Plan) Without Intent to Act (Lifetime) 1  3. Active Suicidal Ideation with any Methods (Not Plan) Without Intent to Act (Past 1 Month) 1  4. Active Suicidal Ideation with Some Intent to Act, Without Specific Plan (Lifetime) 0  4. Active Suicidal Ideation with Some Intent to Act, Without Specific Plan (Past 1 Month) 0  5. Active Suicidal Ideation with Specific Plan and Intent (Lifetime) 0  5. Active Suicidal Ideation with Specific Plan and Intent (Past 1 Month) 0  Most Severe Ideation Rating (Lifetime) 4  Most Severe Ideation Rating (Past 1 Month) 4  Frequency (Lifetime) 5  Frequency (Past 1 Month) 5  Duration (Lifetime) 3  Duration (Past 1 Month) 3  Controllability (Lifetime) 2  Controllability (Past 1 Month) 2  Deterrents (Lifetime) 1  Deterrents (Past 1 Month) 1  Reasons for Ideation (Lifetime) 4  Reasons for Ideation (Past 1 Month) 4  Actual Attempt (Lifetime) 0  Has subject engaged in non-suicidal self-injurious behavior? (Lifetime) 0  Interrupted Attempts (Lifetime) 0  Aborted or Self-Interrupted Attempt (Lifetime) 0  Preparatory Acts or Behavior  (Lifetime) 0  Calculated C-SSRS Risk Score (Lifetime/Recent) Moderate Risk        LOCUS completed for recommended level of care? yes 18  IOP: 17-19; PHP: 20-22                Copy/Paste current Safety Plan to the BEH TX PLAN ENCOUNTER. yes     Safety status/concerns: denies     Substance use concerns: no     Based on the positive CAGE score and clinical interview there  are not indications of drug or alcohol abuse.        Pertinent Medical/Nutritional concerns: no     Review Tele-Health Requirements (including secure environment, confidentiality, in-state status, equipment needs and process - encourage MyChart): yes     Confirm Emergency Contact listed in the SnapShot/Demographics with patient and notify OBC if an update is required. yes   Kavita [phone]      Paper or Docusign requirements for ROIs, e-JAQUI, emergency contact, etc have been completed? no educated  If not, do upon admission.      Does patient have FMLA or Short-Term Disability requests/plans? yes is an;ling out with therapist today  NOTE: Whenever possible, FMLA or Short-Term Disability paperwork needs to be managed/completed by the patient's community provider.   Exceptions: Patient does not have a community provider AND request is specific to mental health and time off for the duration of the program participation.    Notify RN Triage as soon as possible.      Care Providers/Medication Management Needs:      Does patient have a current community or other [facility] provider prescribing medications for mental health? yes  NOTE: Delete below if not applicable:     Psychiatric Provider (or PCP if managing MH meds)/Name: new intake was done for med provider Ameena Cerda                   NOTE: Inform patients, program is temporary and we will not be transferring care. Patient's should continue to see their community provider.                 Idividual Therapist/Name:    Madhuri Kahn-  3/21/23                Patient will continue to  see above provider while participating in program: yes           RECOMMENDATIONS:      Patient Admission Completed: yes     Care Team, referrals made/needed: no  PCP: Anisha Cosme MD  NOTE: Notify RN, as needed, to make internal referrals.                                                              Completed by: Nidhi Matute RN

## 2023-03-28 NOTE — GROUP NOTE
Psychoeducation Group Note    PATIENT'S NAME: Nancy Montemayor  MRN:   [id number]  :   1960  ACCT. NUMBER: [id number]  DATE OF SERVICE: 3/28/23  START TIME: 10:00 AM  END TIME: 10:50 AM  FACILITATOR: Kristie Aguilera LPCC  TOPIC:  Wellness Group: Medication Education and Management  [facility] 55+ Program/DT/IOP  TRACK: A-IOP AM    NUMBER OF PARTICIPANTS: 7    Summary of Group / Topics Discussed:  Medication Educations and Management:  Medication Jeopardy: Patients provided education regarding medication safety, antidepressants, side effects, neuroleptics, expected medication outcomes, knowledge of diagnosis, symptoms, and symptom management through an engaging jeopardy-style format.     Patient Session Goals / Objectives:    ? Participated in team-based Jeopardy game  ? Identified strategies for safe use, handling, and disposal of medications  ? Discussed basic aspects of medication safety, side effects, adverse outcomes and contraindications                                      Service Modality:  Video Visit     Telemedicine Visit: The patient's condition can be safely assessed and treated via synchronous audio and visual telemedicine encounter.      Reason for Telemedicine Visit: Services only offered telehealth    Originating Site (Patient Location): Patient's home    Distant Site (Provider Location): Provider Remote Setting- Home Office    Consent:  The patient/guardian has verbally consented to: the potential risks and benefits of telemedicine (video visit) versus in person care; bill my insurance or make self-payment for services provided; and responsibility for payment of non-covered services.     Patient would like the video invitation sent by:  My Chart    Mode of Communication:  Video Conference via Medical Zoom    As the provider I attest to compliance with applicable laws and regulations related to telemedicine.                              Patient Participation / Response:  Fully  participated with the group by sharing personal reflections / insights and openly received / provided feedback with other participants.     Demonstrated understanding of topics discussed through group discussion and participation, Identified / Expressed personal readiness to practice skills and Verbalized understanding of medication education and management topic    Treatment Plan:  Patient has an initial individualized treatment plan that was created as part of their diagnostic assessment / admission process.  A master individualized treatment plan is in the process of being developed with the patient and multi-disciplinary care team.    Kristie Aguilera, PITAC

## 2023-03-29 ENCOUNTER — MYC MEDICAL ADVICE (OUTPATIENT)
Dept: BEHAVIORAL HEALTH | Facility: CLINIC | Age: 63
End: 2023-03-29
Payer: COMMERCIAL

## 2023-03-29 ENCOUNTER — HOSPITAL ENCOUNTER (OUTPATIENT)
Dept: BEHAVIORAL HEALTH | Facility: CLINIC | Age: 63
Discharge: HOME | End: 2023-03-29
Attending: PSYCHIATRY & NEUROLOGY
Payer: COMMERCIAL

## 2023-03-29 DIAGNOSIS — F33.1 MODERATE EPISODE OF RECURRENT MAJOR DEPRESSIVE DISORDER (H): Primary | ICD-10-CM

## 2023-03-29 DIAGNOSIS — F33.1 MODERATE EPISODE OF RECURRENT MAJOR DEPRESSIVE DISORDER (H): ICD-10-CM

## 2023-03-29 PROCEDURE — 90853 GROUP PSYCHOTHERAPY: CPT | Mod: GT,95 | Performed by: SOCIAL WORKER

## 2023-03-29 PROCEDURE — 90853 GROUP PSYCHOTHERAPY: CPT | Mod: GT,95

## 2023-03-29 RX ORDER — BUPROPION HYDROCHLORIDE 300 MG/1
300 TABLET ORAL EVERY MORNING
COMMUNITY
Start: 2023-03-29 | End: 2023-08-15

## 2023-03-29 RX ORDER — BUPROPION HYDROCHLORIDE 150 MG/1
150 TABLET ORAL EVERY MORNING
Qty: 30 TABLET | Refills: 0 | Status: SHIPPED | OUTPATIENT
Start: 2023-03-29 | End: 2023-08-15

## 2023-03-29 NOTE — GROUP NOTE
Psychotherapy Group Note    PATIENT'S NAME: Nancy Montemayor  MRN:   [id number]  :   1960  ACCT. NUMBER: [id number]  DATE OF SERVICE: 3/29/23  START TIME: 11:00 AM  END TIME: 11:50 AM  FACILITATOR: Christie Alvares LGSW  TOPIC: MH EBP Group: Mindfulness  [facility] 55+ Program  TRACK: A1    NUMBER OF PARTICIPANTS: 7    Summary of Group / Topics Discussed:  Mindfulness: What is Mindfulness: Patients received an overview on what mindfulness is and how mindfulness can benefit general health, mental health symptoms, and stressors. The history of mindfulness, its application to mental health therapies, and key concepts were also discussed. Patients discussed current awareness, knowledge, and practice of mindfulness skills. Patients also discussed barriers to mindfulness practice.     Patient Session Goals / Objectives:    Demonstrated understanding of key concepts and application to daily life    Identified when/how to use mindfulness     Resolved barriers to practice    Identified plan to use mindfulness in daily life                                      Service Modality:  Video Visit     Telemedicine Visit: The patient's condition can be safely assessed and treated via synchronous audio and visual telemedicine encounter.      Reason for Telemedicine Visit: Services only offered telehealth    Originating Site (Patient Location): Patient's home    Distant Site (Provider Location): Provider Remote Setting- Home Office    Consent:  The patient/guardian has verbally consented to: the potential risks and benefits of telemedicine (video visit) versus in person care; bill my insurance or make self-payment for services provided; and responsibility for payment of non-covered services.     Patient would like the video invitation sent by:  My Chart    Mode of Communication:  Video Conference via Medical Zoom    As the provider I attest to compliance with applicable laws and regulations related to telemedicine.                                Patient Participation / Response:  Fully participated with the group by sharing personal reflections / insights and openly received / provided feedback with other participants.    Demonstrated understanding of topics discussed through group discussion and participation, Demonstrated understanding of mindfulness skills and benefits of practice, Identified / Expressed personal readiness to practice mindfulness skills, Verbalized understanding of how mindfulness can benefit mental health symptoms, Identified plan to address barriers to practicing mindfulness skills  and Practiced skills in session    Treatment Plan:  Patient has a current master individualized treatment plan.  See Epic treatment plan for more information.    Christie Alvares LGSW

## 2023-03-29 NOTE — TELEPHONE ENCOUNTER
T.C. nursing please call the patient about the followin.     Writer re-sent a new script of:    -Bupropion/Wellbutrin 150 mg XL in the AM; #30    -Can remain on Wellbutrin 150 mg since the patient hasn't increased to 300 mg yet (note patient was seen on 3/16/2023;  3/17 to 3/29/2023 = 13 days).    -Removed Cymbalta 60 mg from medication list    -Can increase Wellbutrin to 300 mg XL if tremor/shaking improves off Cymbalta + isn't occurring after completion of Wellbutrin 150 mg XL #30    2.    -Follow up with neurology as recommended per PCP    -Coordinate with PCP if necessary for pain management referral d/t describing things being poorly controlled (ie aching all over today) despite being on Cymbalta 60 mg which is now being discontinued.    3.    911 and or to the nearest ED if:    Tremor/body movements:  -worsen in hands, fingers  -cause near falls or falls    Any emerging:    -vision changes = in one or both eyes  -severe headaches  -confusion  -trouble speaking or understanding speech  -trouble walking, problems with balance, or dizziness

## 2023-03-29 NOTE — TELEPHONE ENCOUNTER
T.C. nursing call the patient (versus wait for MyChart response) about the followin.     -Triage for any other medical s/s or if clarify if concern is limited to fingers/hands shaking (during rest as well or just while typing, eating, etc).    -Any alcohol? or increase in THC usage??    2.    Both Bupropion/Wellbutrin (3 to 6 %) + Duloxetine/Cymbalta (3%) can cause tremor.    -Discontinue the Duloxetine 60 mg daily (wasn't helping pain or mood after prior switch from Wellbutrin)    -If tremor/shaking while typing doesn't improve after discontinuing the Cymbalta then reduce Wellbutrin from 300 mg to 150 mg and reschedule with PCP within the next 1 to 2 weeks for check in    3.    Coordinate assisting with re-establishing psychiatry next LOC

## 2023-03-29 NOTE — CONFIDENTIAL NOTE
[facility] RN attempted to call patient to gather additional information but call goes to voicemail. [facility] RN sent back [email or web address] message to patient seeking additional information and routed encounter to [facility] provider.

## 2023-03-29 NOTE — CONFIDENTIAL NOTE
[facility] RN sent patient MyChart message as patient is attending group.   RN will call patient at 12:00pm for follow up and triaging.

## 2023-03-29 NOTE — CONFIDENTIAL NOTE
[facility] RN received instructions to call Nancy and update her with details of note by Chela Levy CNP at 3/29/23 at 1:13pm.    [facility] RN called and reached Nancy at [phone]. [facility] RN read through the note and reviewed instructions with patient.    Nancy stated that she understood the instructions and asked whether the prescription would be sent to the pharmacy today. [facility] RN confirmed that the script for Wellbutrin 150mg had been sent to the pharmacy.   Nancy stated that she will  this new medication, discontinue Cymbalta 60 mg, take Wellbutrin 150 mg  While watching for improvement of tremors, if tremors improved on finishing Wellbutrin 150 mg she would start taking Wellbutrin 300 mg as instructed, follow up with PCP and neurology.     She also requested RN to sent the instructions to [employer] for reference.    RN sending the instructions via [employer] and routing encounter to [facility] provider for update.

## 2023-03-29 NOTE — TELEPHONE ENCOUNTER
[facility] RN called Nancy at [phone].    Nancy is welcoming to this call. She reports having read instructions RN sent via [employer] and reports that she was in her therapy group when RN called.    She reports symptoms of tremors and hands shaking started 4 days ago and today are worse. She reports that the tremors present all the time. Two days ago her body started twitching at night, knee, shoulder.     She reports that she Is still on 150 mg Wellbutrin as she is still within 10 days of starting it. She reports that she will continue to Wellbutrin 300 mg as does not have any more Wellbutrin 150 mg left.     She reports that she will stop taking Duloxetine 60. She states that she was taking duloxetine for depression and pain and that she was on Celebrex. She states that she anticipated that she will need something for chronic pain when she stops taking duloxetine. Today her body is aching all over.     She reports that she will follow up with PCP in one week if no changes to tremors noted.    She states that she has had no alcohol. She reports smoking THC every night with no change. This is off the street not medically prescribed. She reports its the same amount she has been having for over a year.    She also reports that she had physical with PCP Anisha Cosme MD  2 weeks ago and had tremors evaluated during the visit. PCP recommended see neurologist for follow up. [facility] RN teaching patient to schedule this appointment for evaluation.    Nancy Montemayor stated that she was not aware of next level of care [facility] or appointment missed. She took the information below and stated that she would reschedule the appointment on completion of this call. [facility] RN also encouraged her to call [facility] clinic to schedule follow up with [facility] provider for medication management bridging as needed if next level of care is in the distant future and patient needs to be  seen. [facility] RN informed patient that she signed JAQUI on 3/15/23 for [facility] and this provider would be following up with her as her longterm psychiatry provider. Nancy Montemayor verbalized understanding this.    Next level of care details provided to patient for rescheduling  Provider(s): Ameena Adams/Location: [facility], [address], [address], [place] [address]  Phone: [phone]

## 2023-03-29 NOTE — GROUP NOTE
Psychotherapy Group Note    PATIENT'S NAME: Nancy Montemayor  MRN:   [id number]  :   1960  ACCT. NUMBER: [id number]  DATE OF SERVICE: 3/29/23  START TIME: 10:00 AM  END TIME: 10:50 AM  FACILITATOR: Nancy Martins LICSW  TOPIC: MH EBP Group: Self-Awareness  Service Modality:  Video Visit     Telemedicine Visit: The patient's condition can be safely assessed and treated via synchronous audio and visual telemedicine encounter.       Reason for Telemedicine Visit: Services only offered telehealth and due to COVID-19.     Originating Site (Patient Location): Patient's home     Distant Site (Provider Location): Provider Remote Setting- Home Office     Consent:  The patient/guardian has verbally consented to: the potential risks and benefits of telemedicine (video visit) versus in person care; bill my insurance or make self-payment for services provided; and responsibility for payment of non-covered services.      Patient would like the video invitation sent by:  My Chart     Mode of Communication:  Video Conference via Medical Zoom     As the provider I attest to compliance with applicable laws and regulations related to telemedicine.     [employer] [place] 55+ Program  TRACK: A1    NUMBER OF PARTICIPANTS: 6    Summary of Group / Topics Discussed:  Self-Awareness: Personal Strengths: Topic focused on assisting patients in identifying personal strengths and how they relate to the management of mental health symptoms. Patients discussed the benefits of acknowledging their personal strengths and their impact on mood improvement, mindfulness, and perspective. Patients worked to increase time spent on recognition and appreciation of what is positive and working in their lives. The goal is to reduce rumination and negative thinking resulting in increased mindfulness and resilience. Patients will work to put skills into practice and problem-solve barriers.     Patient Session Goals / Objectives:    Identified personal  strengths    Verbalized understanding of strategies to increase use of their strengths in management of daily symptoms    Created a personal affirmation statement by recognizing strengths and life accomplishments.      Patient Participation / Response:  Fully participated with the group by sharing personal reflections / insights and openly received / provided feedback with other participants.    Demonstrated understanding of topics discussed through group discussion and participation, Demonstrated understanding of values, strengths, and challenges to learn about themselves and Practiced skills in session    Treatment Plan:  Patient has a current master individualized treatment plan.  See Epic treatment plan for more information.    Nancy Martins, FRANCESCASW

## 2023-03-29 NOTE — GROUP NOTE
Process Group Note    PATIENT'S NAME: Nancy Montemayor  MRN:   [id number]  :   1960  ACCT. NUMBER: [id number]  DATE OF SERVICE: 3/28/23  START TIME: 11:00 AM  END TIME: 11:50 AM  FACILITATOR: Adri Ahmadi LPCC  TOPIC:  Process Group    Diagnoses:  1.  Moderate episode of recurrent major depressive disorder (H)       [facility] Day Treatment  TRACK: AIOP    NUMBER OF PARTICIPANTS: 7                                      Service Modality:  Video Visit     Telemedicine Visit: The patient's condition can be safely assessed and treated via synchronous audio and visual telemedicine encounter.      Reason for Telemedicine Visit: Services only offered telehealth    Originating Site (Patient Location): Patient's home    Distant Site (Provider Location): Provider Remote Setting- Home Office    Consent:  The patient/guardian has verbally consented to: the potential risks and benefits of telemedicine (video visit) versus in person care; bill my insurance or make self-payment for services provided; and responsibility for payment of non-covered services.     Patient would like the video invitation sent by:  My Chart    Mode of Communication:  Video Conference via Medical Zoom    As the provider I attest to compliance with applicable laws and regulations related to telemedicine.                                   Data:    Session content: At the start of this group, patients were invited to check in by identifying themselves, describing their current emotional status, and identifying issues to address in this group.   Area(s) of treatment focus addressed in this session included Symptom Management, Personal Safety, Develop / Improve Independent Living Skills and Develop Socialization / Interpersonal Relationship Skills.    Patient reported feeling 'tired.'  Patient discussed working toward having a sleep schedule, not taking naps.  Skills they plan to practice to address the goal include  creating a schedule.   They identified a potential barrier as feeling tired, poor sleep.  Patient reported no safety concerns or SIB.  Patient reported no substance use.  Patient reported taking medications as prescribed.  Patient reported feeling proud/grateful for last day in AIOP, starting new group.        Therapeutic Interventions/Treatment Strategies:  Psychotherapist offered support, feedback and validation and reinforced use of skills. Treatment modalities used include Cognitive Behavioral Therapy and Dialectical Behavioral Therapy. Interventions include Behavioral Activation: Reinforced benefits/challenges of change process through applying skills to replace unwanted behaviors and Explored how behaviors effect mood and interact with thoughts and feelings and Symptoms Management: Promoted understanding of their diagnoses and how it impacts their functioning.    Assessment:    Patient response:   Patient responded to session by accepting feedback, giving feedback, listening, focusing on goals, being attentive, accepting support and verbalizing understanding    Possible barriers to participation / learning include: and no barriers identified    Health Issues:   None reported       Substance Use Review:   Substance Use: No active concerns identified.    Mental Status/Behavioral Observations  Appearance:   Appropriate   Eye Contact:   Good   Psychomotor Behavior: Normal   Attitude:   Cooperative   Orientation:   All  Speech   Rate / Production: Normal/ Responsive   Volume:  Normal   Mood:    Apathetic  Affect:    Lethargic   Thought Content:   Clear  Thought Form:  Coherent  Logical     Insight:    Good     Plan:     Safety Plan: No current safety concerns identified.  Recommended that patient call 911 or go to the local ED should there be a change in any of these risk factors.     Barriers to treatment: None identified    Patient Contracts (see media tab):  None    Substance Use: Not addressed in session      Continue or Discharge: Patient will continue in Adult Day Treatment (ADT)  as planned. Patient is likely to benefit from learning and using skills as they work toward the goals identified in their treatment plan.      Adri Ahmadi, PITAC  March 29, 2023

## 2023-03-29 NOTE — GROUP NOTE
Process Group Note    PATIENT'S NAME: Nancy Montemayor  MRN:   [id number]  :   1960  ACCT. NUMBER: [id number]  DATE OF SERVICE: 3/29/23  START TIME:  9:00 AM  END TIME:  9:50 AM  FACILITATOR: Nancy Martins LICSW  TOPIC:  Process Group    Diagnoses:  296.32 (F33.1) Major Depressive Disorder, Recurrent Episode, Moderate         Service Modality:  Video Visit     Telemedicine Visit: The patient's condition can be safely assessed and treated via synchronous audio and visual telemedicine encounter.       Reason for Telemedicine Visit: Services only offered telehealth and due to COVID-19.     Originating Site (Patient Location): Patient's home     Distant Site (Provider Location): Provider Remote Setting- Home Office     Consent:  The patient/guardian has verbally consented to: the potential risks and benefits of telemedicine (video visit) versus in person care; bill my insurance or make self-payment for services provided; and responsibility for payment of non-covered services.      Patient would like the video invitation sent by:  My Chart     Mode of Communication:  Video Conference via Medical Zoom     As the provider I attest to compliance with applicable laws and regulations related to telemedicine.    [facility] 55+ Program  TRACK: A1    NUMBER OF PARTICIPANTS: 6          Data:    Session content: At the start of this group, patients were invited to check in by identifying themselves, describing their current emotional status, and identifying issues to address in this group.   Area(s) of treatment focus addressed in this session included Symptom Management, Personal Safety, Community Resources/Discharge Planning, Abstinence/Relapse Prevention, Develop / Improve Independent Living Skills and Develop Socialization / Interpersonal Relationship Skills.    Nancy transitioned to the A1 track from the A-IOP track. She was welcomed and introductions were provided. Nancy was able to self disclose to  peers in group her mood symptoms and life events leading to her admission. She reports depressed and anxious mood. Denies S/I or safety issues. 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 Her location closed recently. Nancy reports residing alone and has the support of  5 girlfriends. She is focused on sleep hygiene routines and schedule.    Therapeutic Interventions/Treatment Strategies:  Psychotherapist offered support, feedback and validation and reinforced use of skills. Treatment modalities used include Cognitive Behavioral Therapy.    Assessment:    Patient response:   Patient responded to session by accepting feedback, giving feedback, listening, focusing on goals, being attentive, accepting support and verbalizing understanding    Possible barriers to participation / learning include: and no barriers identified    Health Issues:   None reported. Medication compliance.       Substance Use Review:   Substance Use: No active concerns identified.    Mental Status/Behavioral Observations  Appearance:   Appropriate   Eye Contact:   Good   Psychomotor Behavior: Normal   Attitude:   Cooperative  Interested Pleasant  Orientation:   All  Speech   Rate / Production: Normal    Volume:  Normal   Mood:    Anxious  Depressed   Affect:    Appropriate  Worrisome   Thought Content:   Clear and Safety denies any current safety concerns including suicidal ideation, self-harm, and homicidal ideation  Thought Form:  Coherent  Goal Directed  Logical     Insight:    Good     Plan:     Safety Plan: No current safety concerns identified.  Recommended that patient call 911 or go to the local ED should there be a change in any of these risk factors.     Barriers to treatment: None identified    Patient Contracts (see media tab):  None    Substance Use: Not addressed in session     Continue or Discharge: Patient will continue in 55+ Program (55+) as planned. Patient is likely to benefit from learning  and using skills as they work toward the goals identified in their treatment plan.      Nancy Martins, [facility]  March 29, 2023

## 2023-04-04 ENCOUNTER — HOSPITAL ENCOUNTER (OUTPATIENT)
Dept: BEHAVIORAL HEALTH | Facility: CLINIC | Age: 63
Discharge: HOME | End: 2023-04-04
Attending: PSYCHIATRY & NEUROLOGY
Payer: COMMERCIAL

## 2023-04-04 DIAGNOSIS — F33.1 MODERATE EPISODE OF RECURRENT MAJOR DEPRESSIVE DISORDER (H): Primary | ICD-10-CM

## 2023-04-04 PROCEDURE — 90853 GROUP PSYCHOTHERAPY: CPT | Mod: GT,95 | Performed by: SOCIAL WORKER

## 2023-04-04 PROCEDURE — 90853 GROUP PSYCHOTHERAPY: CPT | Mod: GT,95

## 2023-04-04 NOTE — GROUP NOTE
Psychoeducation Group Note    PATIENT'S NAME: Nancy Montemayor  MRN:   [id number]  :   1960  ACCT. NUMBER: [id number]  DATE OF SERVICE: 23  START TIME: 10:00 AM  END TIME: 10:50 AM  FACILITATOR: Nancy Martins LICSW  TOPIC: MH Wellness Group: Health Maintenance  Service Modality:  Video Visit     Telemedicine Visit: The patient's condition can be safely assessed and treated via synchronous audio and visual telemedicine encounter.       Reason for Telemedicine Visit: Services only offered telehealth and due to COVID-19.     Originating Site (Patient Location): Patient's home     Distant Site (Provider Location): Provider Remote Setting- Home Office     Consent:  The patient/guardian has verbally consented to: the potential risks and benefits of telemedicine (video visit) versus in person care; bill my insurance or make self-payment for services provided; and responsibility for payment of non-covered services.      Patient would like the video invitation sent by:  My Chart     Mode of Communication:  Video Conference via Medical Zoom     As the provider I attest to compliance with applicable laws and regulations related to telemedicine.     [facility] 55+ Program  TRACK: A1    NUMBER OF PARTICIPANTS: 6    Summary of Group / Topics Discussed:  Health Maintenance: Eight Dimensions of Wellness: The concept of holistic health through the model of eight dimensions was introduced. Group members participated in identifying behaviors and activities in each of the dimensions of wellness.  The importance of each dimension was reinforced and the concept of balance in life as it relates to wellness was explored.      Patient Session Goals / Objectives:    Verbalized understanding of balance in wellness and how it relates to their life    Identified and explained the eight dimensions of wellness    Categorized activities and wellness needs into corresponding dimensions appropriately during exercise         Patient Participation / Response:  Fully participated with the group by sharing personal reflections / insights and openly received / provided feedback with other participants.    Demonstrated understanding of topics discussed through group discussion and participation and Verbalized understanding of health maintenance topic    Treatment Plan:  Patient has a current master individualized treatment plan.  See Epic treatment plan for more information.    Nancy Martins, LICSW

## 2023-04-04 NOTE — GROUP NOTE
Process Group Note    PATIENT'S NAME: Nancy Montemayor  MRN:   [id number]  :   1960  ACCT. NUMBER: [id number]  DATE OF SERVICE: 23  START TIME:  9:00 AM  END TIME:  9:50 AM  FACILITATOR: Nancy Martins LICSW  TOPIC:  Process Group    Diagnoses:  296.32 (F33.1) Major Depressive Disorder, Recurrent Episode, Moderate         Service Modality:  Video Visit     Telemedicine Visit: The patient's condition can be safely assessed and treated via synchronous audio and visual telemedicine encounter.       Reason for Telemedicine Visit: Services only offered telehealth and due to COVID-19.     Originating Site (Patient Location): Patient's home     Distant Site (Provider Location): Provider Remote Setting- Home Office     Consent:  The patient/guardian has verbally consented to: the potential risks and benefits of telemedicine (video visit) versus in person care; bill my insurance or make self-payment for services provided; and responsibility for payment of non-covered services.      Patient would like the video invitation sent by:  My Chart     Mode of Communication:  Video Conference via Medical Zoom     As the provider I attest to compliance with applicable laws and regulations related to telemedicine.     [facility] 55+ Program  TRACK: A1    NUMBER OF PARTICIPANTS: 7          Data:    Session content: At the start of this group, patients were invited to check in by identifying themselves, describing their current emotional status, and identifying issues to address in this group.   Area(s) of treatment focus addressed in this session included Symptom Management, Personal Safety, Community Resources/Discharge Planning, Abstinence/Relapse Prevention, Develop / Improve Independent Living Skills and Develop Socialization / Interpersonal Relationship Skills.    Nancy reports less depressed and less anxious mood. Denies S/I or safety issues. She is recovered from having the FLU over the weekend. She was  able to sleep. Nancy felt well enough to attend a baby shower on Sunday. She connected with two friends who are twin sisters who are supportive and gifted her a vibration plate to assist with managing her wellness and Fibromyalgia. She reported on the processed of acceptance.       Therapeutic Interventions/Treatment Strategies:  Psychotherapist offered support, feedback and validation and reinforced use of skills. Treatment modalities used include Cognitive Behavioral Therapy.    Assessment:    Patient response:   Patient responded to session by accepting feedback, giving feedback, listening, focusing on goals, being attentive, accepting support and verbalizing understanding    Possible barriers to participation / learning include: and no barriers identified    Health Issues:   Yes: Chronic disease management, Associated Psychological Distress     Med compliance.       Substance Use Review:   Substance Use: No active concerns identified.    Mental Status/Behavioral Observations  Appearance:   Appropriate   Eye Contact:   Good   Psychomotor Behavior: Normal   Attitude:   Cooperative  Interested  Orientation:   All  Speech   Rate / Production: Normal    Volume:  Normal   Mood:    Less depressed and less anxious mood  Affect:    Appropriate   Thought Content:   Clear and Safety denies any current safety concerns including suicidal ideation, self-harm, and homicidal ideation  Thought Form:  Coherent  Goal Directed  Logical     Insight:    Good     Plan:     Safety Plan: No current safety concerns identified.  Recommended that patient call 911 or go to the local ED should there be a change in any of these risk factors.     Barriers to treatment: None identified    Patient Contracts (see media tab):  None    Substance Use: Not addressed in session     Continue or Discharge: Patient will continue in 55+ Program (55+) as planned. Patient is likely to benefit from learning and using skills as they work toward the goals  identified in their treatment plan.      Nancy Martins, [facility]  April 4, 2023

## 2023-04-04 NOTE — GROUP NOTE
Psychotherapy Group Note    PATIENT'S NAME: Nancy Montemayor  MRN:   [id number]  :   1960  ACCT. NUMBER: [id number]  DATE OF SERVICE: 23  START TIME: 11:00 AM  END TIME: 11:50 AM  FACILITATOR: Christie Alvares LGSW  TOPIC: MH EBP Group: Specialty Awareness  [facility] 55+ Program  TRACK: A1    NUMBER OF PARTICIPANTS: 6    Summary of Group / Topics Discussed:  Specialty Topics: Forgiveness: Patients were provided with an overview of the topic of forgiveness including how to better understand the nature of forgiveness of others and oneself. Exploring the phases of forgiveness and how one works them was covered. Patients were able to explore how practicing forgiveness may positively impact their functioning.     Patient Session Goals / Objectives:    Discussed definitions of forgiveness and self-forgiveness    Explored the phases and process of forgiveness    Discussed benefits of forgiveness to their relationships with themselves and others, connecting the concept to mindfulness and acceptance    Assisted patients in recognizing when practicing forgiveness may be helpful                                      Service Modality:  Video Visit     Telemedicine Visit: The patient's condition can be safely assessed and treated via synchronous audio and visual telemedicine encounter.      Reason for Telemedicine Visit: Services only offered telehealth    Originating Site (Patient Location): Patient's home    Distant Site (Provider Location): Provider Remote Setting- Home Office    Consent:  The patient/guardian has verbally consented to: the potential risks and benefits of telemedicine (video visit) versus in person care; bill my insurance or make self-payment for services provided; and responsibility for payment of non-covered services.     Patient would like the video invitation sent by:  My Chart    Mode of Communication:  Video Conference via Medical Zoom    As the provider I attest to compliance with  applicable laws and regulations related to telemedicine.                               Patient Participation / Response:  Fully participated with the group by sharing personal reflections / insights and openly received / provided feedback with other participants.    Demonstrated understanding of topics discussed through group discussion and participation, Identified / Expressed readiness to act on skill suggestions discussed in topic, Verbalized understanding of ways to proactively manage illness and Identified plan to address barriers to practicing skills discussed in topic    Treatment Plan:  Patient has a current master individualized treatment plan.  See Epic treatment plan for more information.    Christie Alvares LGSW

## 2023-04-05 ENCOUNTER — HOSPITAL ENCOUNTER (OUTPATIENT)
Dept: BEHAVIORAL HEALTH | Facility: CLINIC | Age: 63
Discharge: HOME | End: 2023-04-05
Attending: PSYCHIATRY & NEUROLOGY
Payer: COMMERCIAL

## 2023-04-05 DIAGNOSIS — F33.1 MODERATE EPISODE OF RECURRENT MAJOR DEPRESSIVE DISORDER (H): Primary | ICD-10-CM

## 2023-04-05 PROCEDURE — 90853 GROUP PSYCHOTHERAPY: CPT | Mod: GT,95

## 2023-04-05 PROCEDURE — 90853 GROUP PSYCHOTHERAPY: CPT | Mod: GT,95 | Performed by: SOCIAL WORKER

## 2023-04-05 NOTE — GROUP NOTE
Psychotherapy Group Note    PATIENT'S NAME: Nancy Montemayor  MRN:   [id number]  :   1960  ACCT. NUMBER: [id number]  DATE OF SERVICE: 23  START TIME: 11:00 AM  END TIME: 11:50 AM  FACILITATOR: Christie Alvares LGSW  TOPIC: MH EBP Group: Relationship Skills  [facility] 55+ Program  TRACK: A1    NUMBER OF PARTICIPANTS: 7    Summary of Group / Topics Discussed:  Relationship Skills: Boundaries: Patients were provided with a general overview of interpersonal boundaries and how lack of boundaries relates to symptoms and functioning. The purpose is to help patients identify boundary issues and gain awareness and skills to work towards healthier interpersonal boundaries. Current awareness of healthy boundary characteristics and barriers to establishing healthy boundaries were discussed.    Patient Session Goals / Objectives:    Familiarized patients with the concept of interpersonal boundaries and their characteristics    Discussed and practiced strategies to promote healthier interpersonal boundaries    Identified boundary issues and identified plan to improve boundaries                                      Service Modality:  Video Visit     Telemedicine Visit: The patient's condition can be safely assessed and treated via synchronous audio and visual telemedicine encounter.      Reason for Telemedicine Visit: Services only offered telehealth    Originating Site (Patient Location): Patient's home    Distant Site (Provider Location): Provider Remote Setting- Home Office    Consent:  The patient/guardian has verbally consented to: the potential risks and benefits of telemedicine (video visit) versus in person care; bill my insurance or make self-payment for services provided; and responsibility for payment of non-covered services.     Patient would like the video invitation sent by:  My Chart    Mode of Communication:  Video Conference via Medical Zoom    As the provider I attest to compliance with applicable  laws and regulations related to telemedicine.                               Patient Participation / Response:  Fully participated with the group by sharing personal reflections / insights and openly received / provided feedback with other participants.    Demonstrated understanding of topics discussed through group discussion and participation, Demonstrated understanding of relationship skills and communication skills, Identified / Expressed personal readiness to incorporate effective communication skills, Verbalized understanding of communication skills, communication challenges, and communication strengths and Identified plan to address barriers to practicing relationship skills     Treatment Plan:  Patient has a current master individualized treatment plan.  See Epic treatment plan for more information.    Christie Alvares LGSW

## 2023-04-05 NOTE — GROUP NOTE
Psychoeducation Group Note    PATIENT'S NAME: Nancy Montemayor  MRN:   [id number]  :   1960  ACCT. NUMBER: [id number]  DATE OF SERVICE: 23  START TIME: 10:00 AM  END TIME: 10:50 AM  FACILITATOR: Nancy Martins LICSW  TOPIC: MH Wellness Group: Mental Health Maintenance  Service Modality:  Video Visit     Telemedicine Visit: The patient's condition can be safely assessed and treated via synchronous audio and visual telemedicine encounter.       Reason for Telemedicine Visit: Services only offered telehealth and due to COVID-19.     Originating Site (Patient Location): Patient's home     Distant Site (Provider Location): Provider Remote Setting- Home Office     Consent:  The patient/guardian has verbally consented to: the potential risks and benefits of telemedicine (video visit) versus in person care; bill my insurance or make self-payment for services provided; and responsibility for payment of non-covered services.      Patient would like the video invitation sent by:  My Chart     Mode of Communication:  Video Conference via Medical Zoom     As the provider I attest to compliance with applicable laws and regulations related to telemedicine.     [facility] 55+ Program  TRACK: A1    NUMBER OF PARTICIPANTS: 7    Summary of Group / Topics Discussed:  Mental Health Maintenance:  Vulnerability: In this group, the concept of vulnerability was explored through the viewing, discussion, and self-reflection of the Lissette Choudhary Talk Titled,  The Power of Vulnerability.      Patient Session Goals / Objectives:  ? Defined and described definition of vulnerability   ? Identified 2 or more ways of practicing authenticity         Patient Participation / Response:  Fully participated with the group by sharing personal reflections / insights and openly received / provided feedback with other participants.    Demonstrated understanding of topics discussed through group discussion and participation and  Verbalized understanding of mental health maintenance topic    Treatment Plan:  Patient has a current master individualized treatment plan.  See Epic treatment plan for more information.    Nancy Martins, FRANCESCASW

## 2023-04-05 NOTE — GROUP NOTE
Process Group Note    PATIENT'S NAME: Nancy Montemayor  MRN:   [id number]  :   1960  ACCT. NUMBER: [id number]  DATE OF SERVICE: 23  START TIME:  9:00 AM  END TIME:  9:50 AM  FACILITATOR: Nancy Martins LICSW  TOPIC:  Process Group    Diagnoses:  296.32 (F33.1) Major Depressive Disorder, Recurrent Episode, Moderate         Service Modality:  Video Visit     Telemedicine Visit: The patient's condition can be safely assessed and treated via synchronous audio and visual telemedicine encounter.       Reason for Telemedicine Visit: Services only offered telehealth and due to COVID-19.     Originating Site (Patient Location): Patient's home     Distant Site (Provider Location): Provider Remote Setting- Home Office     Consent:  The patient/guardian has verbally consented to: the potential risks and benefits of telemedicine (video visit) versus in person care; bill my insurance or make self-payment for services provided; and responsibility for payment of non-covered services.      Patient would like the video invitation sent by:  My Chart     Mode of Communication:  Video Conference via Medical Zoom     As the provider I attest to compliance with applicable laws and regulations related to telemedicine.     [facility] 55+ Program  TRACK: A1    NUMBER OF PARTICIPANTS: 7          Data:    Session content: At the start of this group, patients were invited to check in by identifying themselves, describing their current emotional status, and identifying issues to address in this group.   Area(s) of treatment focus addressed in this session included Symptom Management, Personal Safety, Community Resources/Discharge Planning, Abstinence/Relapse Prevention, Develop / Improve Independent Living Skills and Develop Socialization / Interpersonal Relationship Skills.    Nancy reports less depressed and less anxious mood. Denies S/I or safety issues. She consulted with her new therapist and found a good connection  and support. She is focused on maintaining positive thoughts. She spent time with her mom by bringing dinner to her. She will also celebrate Passover with her parents by picking up food that her sister made. Nancy is using coping skills for symptom management.  She made a follow up psychiatrist appointment for medication management.      Therapeutic Interventions/Treatment Strategies:  Psychotherapist offered support, feedback and validation and reinforced use of skills. Treatment modalities used include Cognitive Behavioral Therapy.    Assessment:    Patient response:   Patient responded to session by accepting feedback, giving feedback, listening, focusing on goals, being attentive, accepting support and verbalizing understanding    Possible barriers to participation / learning include: and no barriers identified    Health Issues:   Yes: Chronic disease management, Associated Psychological Distress     Med compliance.       Substance Use Review:   Substance Use: No active concerns identified.    Mental Status/Behavioral Observations  Appearance:   Appropriate   Eye Contact:   Good   Psychomotor Behavior: Normal   Attitude:   Cooperative  Interested Pleasant  Orientation:   All  Speech   Rate / Production: Normal    Volume:  Normal   Mood:    Less depressed and less anxious mood  Affect:    Appropriate   Thought Content:   Clear and Safety denies any current safety concerns including suicidal ideation, self-harm, and homicidal ideation  Thought Form:  Coherent  Goal Directed  Logical     Insight:    Good     Plan:     Safety Plan: No current safety concerns identified.  Recommended that patient call 911 or go to the local ED should there be a change in any of these risk factors.     Barriers to treatment: None identified    Patient Contracts (see media tab):  None    Substance Use: Not addressed in session     Continue or Discharge: Patient will continue in 55+ Program (55+) as planned. Patient is likely to benefit  from learning and using skills as they work toward the goals identified in their treatment plan.      Nancy Martins, [facility]  April 5, 2023

## 2023-04-07 ENCOUNTER — HOSPITAL ENCOUNTER (OUTPATIENT)
Dept: BEHAVIORAL HEALTH | Facility: CLINIC | Age: 63
Discharge: HOME | End: 2023-04-07
Attending: PSYCHIATRY & NEUROLOGY
Payer: COMMERCIAL

## 2023-04-07 DIAGNOSIS — F33.1 MODERATE EPISODE OF RECURRENT MAJOR DEPRESSIVE DISORDER (H): Primary | ICD-10-CM

## 2023-04-07 PROCEDURE — 90853 GROUP PSYCHOTHERAPY: CPT | Mod: GT,95

## 2023-04-07 NOTE — GROUP NOTE
Psychotherapy Group Note    PATIENT'S NAME: Nancy Montemayor  MRN:   [id number]  :   1960  ACCT. NUMBER: [id number]  DATE OF SERVICE: 23  START TIME: 11:00 AM  END TIME: 11:50 AM  FACILITATOR: Christie Alvares LGSW  TOPIC: MH EBP Group: Relationship Skills  [facility] 55+ Program  TRACK: A1    NUMBER OF PARTICIPANTS: 8    Summary of Group / Topics Discussed:  Relationship Skills: Assertive Communication: Patients were provided with a general overview of assertive communication skills and how practicing assertive communication skills will assist patients in developing healthier and more effective relationships. Patients reviewed their current awareness on ability to practice assertive communication, ways to increase assertive communication, and identified/problem solved barriers to assertive communication.     Patient Session Goals / Objectives:    Identified and discussed patient individual challenges with communication    Presented and practiced effective communication skills in session    Assisted patients in implementing more effective communication skills in their relationships    Discussed and applied DBT's PREMA                                    Service Modality:  Video Visit     Telemedicine Visit: The patient's condition can be safely assessed and treated via synchronous audio and visual telemedicine encounter.      Reason for Telemedicine Visit: Services only offered telehealth    Originating Site (Patient Location): Patient's home    Distant Site (Provider Location): Provider Remote Setting- Home Office    Consent:  The patient/guardian has verbally consented to: the potential risks and benefits of telemedicine (video visit) versus in person care; bill my insurance or make self-payment for services provided; and responsibility for payment of non-covered services.     Patient would like the video invitation sent by:  My Chart    Mode of Communication:  Video Conference via Medical  Zoom    As the provider I attest to compliance with applicable laws and regulations related to telemedicine.                               Patient Participation / Response:  Fully participated with the group by sharing personal reflections / insights and openly received / provided feedback with other participants.    Demonstrated understanding of topics discussed through group discussion and participation, Demonstrated understanding of relationship skills and communication skills, Identified / Expressed personal readiness to incorporate effective communication skills, Verbalized understanding of communication skills, communication challenges, and communication strengths, Identified plan to address barriers to practicing relationship skills  and Practiced skills in session    Treatment Plan:  Patient has a current master individualized treatment plan.  See Epic treatment plan for more information.    Christie Alvares LGSW

## 2023-04-07 NOTE — GROUP NOTE
Process Group Note    PATIENT'S NAME: Nancy Montemayor  MRN:   [id number]  :   1960  ACCT. NUMBER: [id number]  DATE OF SERVICE: 23  START TIME:  9:00 AM  END TIME:  9:50 AM  FACILITATOR: Leeanne Maria LMFT  TOPIC:  Process Group    Diagnoses:  Principal DSM5 Diagnoses  (Sustained by DSM5 Criteria Listed Above):   296.32 (F33.1) Major Depressive Disorder, Recurrent Episode, Moderate        [facility] Day Treatment  TRACK: A1    NUMBER OF PARTICIPANTS: 8                                      Service Modality:  Video Visit     Telemedicine Visit: The patient's condition can be safely assessed and treated via synchronous audio and visual telemedicine encounter.      Reason for Telemedicine Visit: Patient has requested telehealth visit    Originating Site (Patient Location): Patient's home    Distant Site (Provider Location): Provider Remote Setting- Home Office    Consent:  The patient/guardian has verbally consented to: the potential risks and benefits of telemedicine (video visit) versus in person care; bill my insurance or make self-payment for services provided; and responsibility for payment of non-covered services.     Patient would like the video invitation sent by:  My Chart    Mode of Communication:  Video Conference via Medical Zoom    As the provider I attest to compliance with applicable laws and regulations related to telemedicine.                                  Data:    Session content: At the start of this group, patients were invited to check in by identifying themselves, describing their current emotional status, and identifying issues to address in this group.   Area(s) of treatment focus addressed in this session included Symptom Management, Personal Safety, Develop / Improve Independent Living Skills and Develop Socialization / Interpersonal Relationship Skills.    Zia reported feeling good today. She stated her goal for today is to pay her taxes and  get out of the house and go to a flower show. Patient reported no barriers. Patient denied safety concerns or chemical use. Patient reported feeling proud of herself for calling her friend to see if she wanted to go to the flower show today.     Therapeutic Interventions/Treatment Strategies:  Psychotherapist offered support, feedback and validation and reinforced use of skills. Treatment modalities used include Motivational Interviewing, Cognitive Behavioral Therapy and Dialectical Behavioral Therapy. Interventions include Behavioral Activation: Explored how behaviors effect mood and interact with thoughts and feelings and Encouraged strategies to reduce individual procrastination and increase motivation by increasing goal-directed activities to enhance mood and reduce symptoms. and Emotions Management:  Reinforced the purpose and biological basis of emotions, Discussed barriers to emotional regulation and Increased awareness of daily mood patterns/changes.    Assessment:    Patient response:   Patient responded to session by accepting feedback, listening, focusing on goals, being attentive and accepting support    Possible barriers to participation / learning include: and no barriers identified    Health Issues:   None reported       Substance Use Review:   Substance Use: No active concerns identified.    Mental Status/Behavioral Observations  Appearance:   Appropriate   Eye Contact:   Good   Psychomotor Behavior: Normal   Attitude:   Cooperative   Orientation:   All  Speech   Rate / Production: Normal    Volume:  Normal   Mood:    Normal  Affect:    Appropriate   Thought Content:   Clear  Thought Form:  Coherent  Logical     Insight:    Good     Plan:     Safety Plan: No current safety concerns identified.  Recommended that patient call 911 or go to the local ED should there be a change in any of these risk factors.     Barriers to treatment: None identified    Patient Contracts (see media tab):   None    Substance Use: Not addressed in session     Continue or Discharge: Patient will continue in 55+ Program (55+) as planned. Patient is likely to benefit from learning and using skills as they work toward the goals identified in their treatment plan.      Leeanne Maria, DANETTE  April 7, 2023

## 2023-04-07 NOTE — GROUP NOTE
Psychotherapy Group Note    PATIENT'S NAME: Nancy Montemayor  MRN:   [id number]  :   1960  ACCT. NUMBER: [id number]  DATE OF SERVICE: 23  START TIME: 10:00 AM  END TIME: 10:50 AM  FACILITATOR: Leeanne Maria LMFT  TOPIC:  EBP Group: [facility] Day [facility]  TRACK: A1    NUMBER OF PARTICIPANTS: 8                                      Service Modality:  Video Visit     Telemedicine Visit: The patient's condition can be safely assessed and treated via synchronous audio and visual telemedicine encounter.      Reason for Telemedicine Visit: Patient has requested telehealth visit    Originating Site (Patient Location): Patient's home    Distant Site (Provider Location): Provider Remote Setting- Home Office    Consent:  The patient/guardian has verbally consented to: the potential risks and benefits of telemedicine (video visit) versus in person care; bill my insurance or make self-payment for services provided; and responsibility for payment of non-covered services.     Patient would like the video invitation sent by:  My Chart    Mode of Communication:  Video Conference via Medical Zoom    As the provider I attest to compliance with applicable laws and regulations related to telemedicine.            Summary of Group / Topics Discussed:  Mindfulness: Mindfulness Experiential: Patients received an overview on what mindfulness is and how mindfulness can benefit general health, mental health symptoms, and stressors. The history of mindfulness, its application to mental health therapies, and key concepts were also discussed. Patients discussed current awareness, knowledge, and practice of mindfulness skills. Patients also discussed barriers to mindfulness practice.    Patient Session Goals / Objectives:    Demonstrated and verbalized understanding of key mindfulness concepts    Identified when/how to use mindfulness skills    Resolved barriers to practicing mindfulness  skills    Identified plan to use mindfulness skills in daily life     Discovered how music can be used to change and uplift mood      Patient Participation / Response:  Fully participated with the group by sharing personal reflections / insights and openly received / provided feedback with other participants.    Demonstrated understanding of topics discussed through group discussion and participation and Practiced skills in session    Treatment Plan:  Patient has a current master individualized treatment plan.  See Epic treatment plan for more information.    Leeanne Maria LMFT

## 2023-04-11 ENCOUNTER — HOSPITAL ENCOUNTER (OUTPATIENT)
Dept: BEHAVIORAL HEALTH | Facility: CLINIC | Age: 63
Discharge: HOME | End: 2023-04-11
Attending: PSYCHIATRY & NEUROLOGY
Payer: COMMERCIAL

## 2023-04-11 DIAGNOSIS — F33.1 MODERATE EPISODE OF RECURRENT MAJOR DEPRESSIVE DISORDER (H): Primary | ICD-10-CM

## 2023-04-11 PROCEDURE — 90853 GROUP PSYCHOTHERAPY: CPT | Mod: GT,95

## 2023-04-11 PROCEDURE — 90853 GROUP PSYCHOTHERAPY: CPT | Mod: GT,95 | Performed by: SOCIAL WORKER

## 2023-04-11 NOTE — GROUP NOTE
Psychoeducation Group Note    PATIENT'S NAME: Nancy Montemayor  MRN:   [id number]  :   1960  ACCT. NUMBER: [id number]  DATE OF SERVICE: 23  START TIME: 10:00 AM  END TIME: 10:50 AM  FACILITATOR: Nancy Martins LICSW  TOPIC: MH Wellness Group: Health Maintenance  Service Modality:  Video Visit     Telemedicine Visit: The patient's condition can be safely assessed and treated via synchronous audio and visual telemedicine encounter.       Reason for Telemedicine Visit: Services only offered telehealth and due to COVID-19.     Originating Site (Patient Location): Patient's home     Distant Site (Provider Location): Provider Remote Setting- Home Office     Consent:  The patient/guardian has verbally consented to: the potential risks and benefits of telemedicine (video visit) versus in person care; bill my insurance or make self-payment for services provided; and responsibility for payment of non-covered services.      Patient would like the video invitation sent by:  My Chart     Mode of Communication:  Video Conference via Medical Zoom     As the provider I attest to compliance with applicable laws and regulations related to telemedicine.     [facility] 55+ Program  TRACK: A1    NUMBER OF PARTICIPANTS: 7    Summary of Group / Topics Discussed:  Health Maintenance: Goal Setting: Meaningful goals can bring a sense of direction and purpose in life.  They also highlight our most important values. Patients were assisted by instructor to identify short term goals to promote their mental health recovery and improve overall health and wellness. Patients were educated on SMART goal setting framework as a strategy to increase outcomes and promote success.    Patient Session Goals / Objectives:  ? Explained the key concepts of SMART goal setting framework  ? Identified three goals successfully using SMART goal setting framework  ? Reviewed concept of balance in wellness as it pertains to goal setting         Patient Participation / Response:  Fully participated with the group by sharing personal reflections / insights and openly received / provided feedback with other participants.    Demonstrated understanding of topics discussed through group discussion and participation and Verbalized understanding of health maintenance topic    Treatment Plan:  Patient has a current master individualized treatment plan.  See Epic treatment plan for more information.    Nancy Martins, LICSW

## 2023-04-11 NOTE — GROUP NOTE
Psychotherapy Group Note    PATIENT'S NAME: Nancy Montemayor  MRN:   [id number]  :   1960  ACCT. NUMBER: [id number]  DATE OF SERVICE: 23  START TIME: 11:00 AM  END TIME: 11:50 AM  FACILITATOR: Christie Alvares LGSW  TOPIC: MH EBP Group: Self-Awareness  [facility] 55+ Program  TRACK: A1    NUMBER OF PARTICIPANTS: 7    Summary of Group / Topics Discussed:  Self-Awareness: Values: Patients identified personal values by examining development of their current values and how their values influence their daily functioning and life choices. Patients explored the impact of their values on their thoughts, feelings, and actions. Patients discussed definition of personal values and how they develop and change over time. The goal is to help patients reconcile value conflicts and achieve balance and flexibility to improve mood and daily functioning.     Patient Session Goals / Objectives:    Examined development of values and impact of values on functioning    Identified and prioritized important values related to current well-being     Identified strategies to change or enhance values to positively impact symptoms    Assisted patients to find ways to adapt functioning to better fit their values                                      Service Modality:  Video Visit     Telemedicine Visit: The patient's condition can be safely assessed and treated via synchronous audio and visual telemedicine encounter.      Reason for Telemedicine Visit: Services only offered telehealth    Originating Site (Patient Location): Patient's home    Distant Site (Provider Location): [facility]: [facility]    Consent:  The patient/guardian has verbally consented to: the potential risks and benefits of telemedicine (video visit) versus in person care; bill my insurance or make self-payment for services provided; and responsibility for payment of non-covered services.     Patient would like the video invitation sent by:  My  Chart    Mode of Communication:  Video Conference via Medical Zoom    As the provider I attest to compliance with applicable laws and regulations related to telemedicine.                               Patient Participation / Response:  Fully participated with the group by sharing personal reflections / insights and openly received / provided feedback with other participants.    Demonstrated understanding of topics discussed through group discussion and participation, Demonstrated understanding of values, strengths, and challenges to learn about themselves, Identified / Expressed readiness to act intentionally, increase self-compassion, promote personal growth, Verbalized understanding of ways to proactively manage illness and Identified plan to address barriers to practicing skills that promote self-awareness     Treatment Plan:  Patient has a current master individualized treatment plan.  See Epic treatment plan for more information.    Christie Alvares LGSW

## 2023-04-11 NOTE — GROUP NOTE
Process Group Note    PATIENT'S NAME: Nancy Montemayor  MRN:   [id number]  :   1960  ACCT. NUMBER: [id number]  DATE OF SERVICE: 23  START TIME:  9:00 AM  END TIME:  9:50 AM  FACILITATOR: Nancy Martins LICSW  TOPIC:  Process Group    Diagnoses:  296.32 (F33.1) Major Depressive Disorder, Recurrent Episode, Moderate         Service Modality:  Video Visit     Telemedicine Visit: The patient's condition can be safely assessed and treated via synchronous audio and visual telemedicine encounter.       Reason for Telemedicine Visit: Services only offered telehealth and due to COVID-19.     Originating Site (Patient Location): Patient's home     Distant Site (Provider Location): Provider Remote Setting- Home Office     Consent:  The patient/guardian has verbally consented to: the potential risks and benefits of telemedicine (video visit) versus in person care; bill my insurance or make self-payment for services provided; and responsibility for payment of non-covered services.      Patient would like the video invitation sent by:  My Chart     Mode of Communication:  Video Conference via Medical Zoom     As the provider I attest to compliance with applicable laws and regulations related to telemedicine.     [facility] 55+ Program  TRACK: A1    NUMBER OF PARTICIPANTS: 7          Data:    Session content: At the start of this group, patients were invited to check in by identifying themselves, describing their current emotional status, and identifying issues to address in this group.   Area(s) of treatment focus addressed in this session included Symptom Management, Personal Safety, Community Resources/Discharge Planning, Abstinence/Relapse Prevention, Develop / Improve Independent Living Skills and Develop Socialization / Interpersonal Relationship Skills.    Nancy reports less depressed and less anxious mood. Denies S/I or safety issues. Nancy reported on how she structured her weekend. She was able  to spend time with family and girlfriends over the weekend and enjoyed it. Nancy did use the [employer] Vibration plate that her friend gifted to her. She was able to enjoy the flower show at the [employer].       Therapeutic Interventions/Treatment Strategies:  Psychotherapist offered support, feedback and validation and reinforced use of skills. Treatment modalities used include Cognitive Behavioral Therapy.    Assessment:    Patient response:   Patient responded to session by accepting feedback, giving feedback, listening, focusing on goals, being attentive, accepting support and verbalizing understanding    Possible barriers to participation / learning include: and no barriers identified    Health Issues:   Yes: Chronic disease management, No Psychological Distress     Medication compliance.       Substance Use Review:   Substance Use: No active concerns identified.    Mental Status/Behavioral Observations  Appearance:   Appropriate   Eye Contact:   Good   Psychomotor Behavior: Normal   Attitude:   Cooperative  Interested Pleasant  Orientation:   All  Speech   Rate / Production: Normal    Volume:  Normal   Mood:    Less depressed and less anxious mood  Affect:    Appropriate   Thought Content:   Clear and Safety denies any current safety concerns including suicidal ideation, self-harm, and homicidal ideation  Thought Form:  Coherent  Goal Directed  Logical     Insight:    Good     Plan:     Safety Plan: No current safety concerns identified.  Recommended that patient call 911 or go to the local ED should there be a change in any of these risk factors.     Barriers to treatment: None identified    Patient Contracts (see media tab):  None    Substance Use: Not addressed in session     Continue or Discharge: Patient will continue in 55+ Program (55+) as planned. Patient is likely to benefit from learning and using skills as they work toward the goals identified in their treatment plan.      Nancy Martins,  LICSW  April 11, 2023

## 2023-04-12 ENCOUNTER — HOSPITAL ENCOUNTER (OUTPATIENT)
Dept: BEHAVIORAL HEALTH | Facility: CLINIC | Age: 63
Discharge: HOME | End: 2023-04-12
Attending: PSYCHIATRY & NEUROLOGY
Payer: COMMERCIAL

## 2023-04-12 DIAGNOSIS — F33.1 MODERATE EPISODE OF RECURRENT MAJOR DEPRESSIVE DISORDER (H): Primary | ICD-10-CM

## 2023-04-12 PROCEDURE — 90853 GROUP PSYCHOTHERAPY: CPT | Mod: GT,95 | Performed by: SOCIAL WORKER

## 2023-04-12 NOTE — GROUP NOTE
Process Group Note    PATIENT'S NAME: Nancy Montemayor  MRN:   [id number]  :   1960  ACCT. NUMBER: [id number]  DATE OF SERVICE: 23  START TIME:  9:00 AM  END TIME:  9:50 AM  FACILITATOR: Nancy Martins LICSW  TOPIC:  Process Group    Diagnoses:  296.32 (F33.1) Major Depressive Disorder, Recurrent Episode, Moderate         Service Modality:  Video Visit     Telemedicine Visit: The patient's condition can be safely assessed and treated via synchronous audio and visual telemedicine encounter.       Reason for Telemedicine Visit: Services only offered telehealth and due to COVID-19.     Originating Site (Patient Location): Patient's home     Distant Site (Provider Location): Provider Remote Setting- Home Office     Consent:  The patient/guardian has verbally consented to: the potential risks and benefits of telemedicine (video visit) versus in person care; bill my insurance or make self-payment for services provided; and responsibility for payment of non-covered services.      Patient would like the video invitation sent by:  My Chart     Mode of Communication:  Video Conference via Medical Zoom     As the provider I attest to compliance with applicable laws and regulations related to telemedicine.     [facility] 55+ Program  TRACK: A1    NUMBER OF PARTICIPANTS: 7          Data:    Session content: At the start of this group, patients were invited to check in by identifying themselves, describing their current emotional status, and identifying issues to address in this group.   Area(s) of treatment focus addressed in this session included Symptom Management, Personal Safety, Community Resources/Discharge Planning, Abstinence/Relapse Prevention, Develop / Improve Independent Living Skills, Develop Socialization / Interpersonal Relationship Skills and Physical Health .    Nancy reports less depressed and less anxious mood. Denies S/I or safety issues. Nancy processed the interpersonal relationship  dynamics with her estranged daughter who resides in [place]. She reports being in more acceptance of this loss. Nancy continues to engage in wellness activities by walking and using her Life Pro Vibration Plate. She is connected with friends for socialization. She continues to use symptom management skills including CBT for cognitive reframing. She consulted with her outpatient therapist.      Therapeutic Interventions/Treatment Strategies:  Psychotherapist offered support, feedback and validation and reinforced use of skills. Treatment modalities used include Cognitive Behavioral Therapy.    Assessment:    Patient response:   Patient responded to session by accepting feedback, giving feedback, listening, focusing on goals, being attentive and accepting support    Possible barriers to participation / learning include: and no barriers identified    Health Issues:   Yes: Chronic disease management, Associated Psychological Distress       Medication compliance.    Substance Use Review:   Substance Use: No active concerns identified.    Mental Status/Behavioral Observations  Appearance:   Appropriate   Eye Contact:   Good   Psychomotor Behavior: Normal   Attitude:   Cooperative  Interested  Orientation:   All  Speech   Rate / Production: Normal    Volume:  Normal   Mood:    Less depressed and less anxious mood  Affect:    Appropriate   Thought Content:   Clear and Safety denies any current safety concerns including suicidal ideation, self-harm, and homicidal ideation  Thought Form:  Coherent  Goal Directed  Logical     Insight:    Good     Plan:     Safety Plan: No current safety concerns identified.  Recommended that patient call 911 or go to the local ED should there be a change in any of these risk factors.     Barriers to treatment: None identified    Patient Contracts (see media tab):  None    Substance Use: Not addressed in session     Continue or Discharge: Patient will continue in 55+ Program (55+) as  planned. Patient is likely to benefit from learning and using skills as they work toward the goals identified in their treatment plan.      Nancy Martins, [facility]  April 12, 2023

## 2023-04-12 NOTE — GROUP NOTE
Psychotherapy Group Note    PATIENT'S NAME: Nancy Montemayor  MRN:   [id number]  :   1960  ACCT. NUMBER: [id number]  DATE OF SERVICE: 23  START TIME: 10:00 AM  END TIME: 10:50 AM  FACILITATOR: Nancy Martins LICSW  TOPIC: MH EBP Group: Symptom Awareness  Service Modality:  Video Visit     Telemedicine Visit: The patient's condition can be safely assessed and treated via synchronous audio and visual telemedicine encounter.       Reason for Telemedicine Visit: Services only offered telehealth and due to COVID-19.     Originating Site (Patient Location): Patient's home     Distant Site (Provider Location): Provider Remote Setting- Home Office     Consent:  The patient/guardian has verbally consented to: the potential risks and benefits of telemedicine (video visit) versus in person care; bill my insurance or make self-payment for services provided; and responsibility for payment of non-covered services.      Patient would like the video invitation sent by:  My Chart     Mode of Communication:  Video Conference via Medical Zoom     As the provider I attest to compliance with applicable laws and regulations related to telemedicine.     [facility] 55+ Program  TRACK: A1    NUMBER OF PARTICIPANTS: 7    Summary of Group / Topics Discussed:  Symptom Awareness: Symptom Observation and Tracking: An overview of symptom observation and tracking was presented to help patients identify specific symptoms and identify patterns. This topic will also assist patient in identifying progress towards goal of decreasing severity of symptoms and increasing overall functioning. Patients completed a symptom check list in session. Patient was assisted in identifying baseline functioning, patterns, and ways to assess current symptoms. Patient was also assisted in identifying a tool or strategy to continue to track or monitor symptoms over a period of time.       Patient Session Goals / Objectives:    Identified patient  individual symptoms and experiences    Identified potential symptom patterns and factors that contribute to changes in symptom severity      Patient Participation / Response:  Fully participated with the group by sharing personal reflections / insights and openly received / provided feedback with other participants.    Demonstrated understanding of topics discussed through group discussion and participation and Demonstrated understanding of how information regarding symptoms can assist in management of symptoms    Treatment Plan:  Patient has a current master individualized treatment plan.  See Epic treatment plan for more information.    Nancy Martins, LICSW

## 2023-04-13 NOTE — ADDENDUM NOTE
Encounter addended by: Christie Alvares LGSW on: 4/13/2023 2:13 PM   Actions taken: Flowsheet accepted

## 2023-04-14 ENCOUNTER — HOSPITAL ENCOUNTER (OUTPATIENT)
Dept: BEHAVIORAL HEALTH | Facility: CLINIC | Age: 63
Discharge: HOME | End: 2023-04-14
Attending: PSYCHIATRY & NEUROLOGY
Payer: COMMERCIAL

## 2023-04-14 DIAGNOSIS — F33.1 MODERATE EPISODE OF RECURRENT MAJOR DEPRESSIVE DISORDER (H): Primary | ICD-10-CM

## 2023-04-14 PROCEDURE — 90853 GROUP PSYCHOTHERAPY: CPT | Mod: GT,95

## 2023-04-14 PROCEDURE — 90853 GROUP PSYCHOTHERAPY: CPT | Mod: GT,95 | Performed by: SOCIAL WORKER

## 2023-04-14 NOTE — GROUP NOTE
Psychotherapy Group Note    PATIENT'S NAME: Nancy Montemayor  MRN:   [id number]  :   1960  ACCT. NUMBER: [id number]  DATE OF SERVICE: 23  START TIME: 10:00 AM  END TIME: 10:50 AM  FACILITATOR: Nancy Martins LICSW  TOPIC: MH EBP Group: Self-Awareness  Service Modality:  Video Visit     Telemedicine Visit: The patient's condition can be safely assessed and treated via synchronous audio and visual telemedicine encounter.       Reason for Telemedicine Visit: Services only offered telehealth and due to COVID-19.     Originating Site (Patient Location): Patient's home     Distant Site (Provider Location): Provider Remote Setting- Home Office     Consent:  The patient/guardian has verbally consented to: the potential risks and benefits of telemedicine (video visit) versus in person care; bill my insurance or make self-payment for services provided; and responsibility for payment of non-covered services.      Patient would like the video invitation sent by:  My Chart     Mode of Communication:  Video Conference via Medical Zoom     As the provider I attest to compliance with applicable laws and regulations related to telemedicine.     [facility] [place] 55+ Program  TRACK: A1    NUMBER OF PARTICIPANTS: 8    Summary of Group / Topics Discussed:  Self-Awareness: Personal Strengths: Topic focused on assisting patients in identifying personal strengths and how they relate to the management of mental health symptoms. Patients discussed the benefits of acknowledging their personal strengths and their impact on mood improvement, mindfulness, and perspective. Patients worked to increase time spent on recognition and appreciation of what is positive and working in their lives. The goal is to reduce rumination and negative thinking resulting in increased mindfulness and resilience. Patients will work to put skills into practice and problem-solve barriers.     Patient Session Goals / Objectives:    Identified personal  strengths    Identified barriers to recognition of personal strengths    Verbalized understanding of strategies to increase use of their strengths in management of daily symptoms      Patient Participation / Response:  Fully participated with the group by sharing personal reflections / insights and openly received / provided feedback with other participants.    Demonstrated understanding of topics discussed through group discussion and participation, Demonstrated understanding of values, strengths, and challenges to learn about themselves and Practiced skills in session    Treatment Plan:  Patient has a current master individualized treatment plan.  See Epic treatment plan for more information.    Nancy Martins, FRANCESCASW

## 2023-04-14 NOTE — GROUP NOTE
Psychotherapy Group Note    PATIENT'S NAME: Nancy Montemayor  MRN:   [id number]  :   1960  ACCT. NUMBER: [id number]  DATE OF SERVICE: 23  START TIME: 11:00 AM  END TIME: 11:50 AM  FACILITATOR: Christie Alvares LGSW  TOPIC: MH EBP Group: Mindfulness  [facility] 55+ Program  TRACK: A1    NUMBER OF PARTICIPANTS: 6    Summary of Group / Topics Discussed:  Mindfulness: Mindfulness Experiential: Working Session Patients received an overview on what mindfulness is and how mindfulness can benefit general health, mental health symptoms, and stressors. Patients discussed current awareness, knowledge, and practice of mindfulness skills. Patients also discussed barriers to mindfulness practice.  Patients took 20 minutes to complete a task mindfully before sharing observations with group afterwards.     Patient Session Goals / Objectives:    Completed a task mindfully    Demonstrated and verbalized understanding of key mindfulness concepts    Identified when/how to use mindfulness skills    Resolved barriers to practicing mindfulness skills    Identified plan to use mindfulness skills in daily life                                     Service Modality:  Video Visit     Telemedicine Visit: The patient's condition can be safely assessed and treated via synchronous audio and visual telemedicine encounter.      Reason for Telemedicine Visit: Services only offered telehealth    Originating Site (Patient Location): Patient's home    Distant Site (Provider Location): Provider Remote Setting- Home Office    Consent:  The patient/guardian has verbally consented to: the potential risks and benefits of telemedicine (video visit) versus in person care; bill my insurance or make self-payment for services provided; and responsibility for payment of non-covered services.     Patient would like the video invitation sent by:  My Chart    Mode of Communication:  Video Conference via Medical Zoom    As the provider I attest to  compliance with applicable laws and regulations related to telemedicine.                               Patient Participation / Response:  Fully participated with the group by sharing personal reflections / insights and openly received / provided feedback with other participants.    Demonstrated understanding of topics discussed through group discussion and participation, Demonstrated understanding of mindfulness skills and benefits of practice, Identified plan to address barriers to practicing mindfulness skills  and Practiced skills in session    Treatment Plan:  Patient has a current master individualized treatment plan.  See Epic treatment plan for more information.    Christie Alvares LGSW

## 2023-04-14 NOTE — GROUP NOTE
Process Group Note    PATIENT'S NAME: Nancy Montemayor  MRN:   [id number]  :   1960  ACCT. NUMBER: [id number]  DATE OF SERVICE: 23  START TIME:  9:00 AM  END TIME:  9:50 AM  FACILITATOR: Nancy Martins LICSW  TOPIC:  Process Group    Diagnoses:  296.32 (F33.1) Major Depressive Disorder, Recurrent Episode, Moderate         Service Modality:  Video Visit     Telemedicine Visit: The patient's condition can be safely assessed and treated via synchronous audio and visual telemedicine encounter.       Reason for Telemedicine Visit: Services only offered telehealth and due to COVID-19.     Originating Site (Patient Location): Patient's home     Distant Site (Provider Location): Provider Remote Setting- Home Office     Consent:  The patient/guardian has verbally consented to: the potential risks and benefits of telemedicine (video visit) versus in person care; bill my insurance or make self-payment for services provided; and responsibility for payment of non-covered services.      Patient would like the video invitation sent by:  My Chart     Mode of Communication:  Video Conference via Medical Zoom     As the provider I attest to compliance with applicable laws and regulations related to telemedicine.     [facility] 55+ Program  TRACK: A1    NUMBER OF PARTICIPANTS: 8          Data:    Session content: At the start of this group, patients were invited to check in by identifying themselves, describing their current emotional status, and identifying issues to address in this group.   Area(s) of treatment focus addressed in this session included Symptom Management, Personal Safety, Community Resources/Discharge Planning, Abstinence/Relapse Prevention, Develop / Improve Independent Living Skills, Develop Socialization / Interpersonal Relationship Skills and Physical Health .    Nancy reports less depressed and less anxious mood. Denies S/I or safety issues. She processed her frustration with co-morbid  physical health symptoms which are also related to the side effect of new medication. She is aware of her capacity for socialization and task management. She is adjusting her self care activities. She consulted with her new psychiatrist who provided medication management. She is feeling more hopeful.       Therapeutic Interventions/Treatment Strategies:  Psychotherapist offered support, feedback and validation and reinforced use of skills. Treatment modalities used include Cognitive Behavioral Therapy.    Assessment:    Patient response:   Patient responded to session by accepting feedback, giving feedback, listening, focusing on goals, being attentive, accepting support and verbalizing understanding    Possible barriers to participation / learning include: and no barriers identified    Health Issues:   Yes: Pain, Associated Psychological Distress  Chronic disease management, Associated Psychological Distress  Medication compliance.    Consulted MD for psychotropic and pain management medications.    She is going to explore a license for medical marijuana.       Substance Use Review:   Substance Use: No active concerns identified.  Does use marijuana gummies at times.      Mental Status/Behavioral Observations  Appearance:   Appropriate   Eye Contact:   Good   Psychomotor Behavior: Normal   Attitude:   Cooperative  Interested Pleasant  Orientation:   All  Speech   Rate / Production: Normal    Volume:  Normal   Mood:    Increase in mood stabilization-less depressed and less anxious mood  Affect:    Appropriate   Thought Content:   Clear and Safety denies any current safety concerns including suicidal ideation, self-harm, and homicidal ideation  Thought Form:  Coherent  Goal Directed  Logical     Insight:    Good     Plan:     Safety Plan: No current safety concerns identified.  Recommended that patient call 911 or go to the local ED should there be a change in any of these risk factors.     Barriers to treatment:  None identified    Patient Contracts (see media tab):  None    Substance Use: Not addressed in session     Continue or Discharge: Patient will continue in 55+ Program (55+) as planned. Patient is likely to benefit from learning and using skills as they work toward the goals identified in their treatment plan.      Nancy Martins, [facility]  April 14, 2023

## 2023-04-15 ENCOUNTER — HEALTH MAINTENANCE LETTER (OUTPATIENT)
Age: 63
End: 2023-04-15

## 2023-04-18 ENCOUNTER — HOSPITAL ENCOUNTER (OUTPATIENT)
Dept: BEHAVIORAL HEALTH | Facility: CLINIC | Age: 63
Discharge: HOME | End: 2023-04-18
Attending: PSYCHIATRY & NEUROLOGY
Payer: COMMERCIAL

## 2023-04-18 DIAGNOSIS — F33.1 MODERATE EPISODE OF RECURRENT MAJOR DEPRESSIVE DISORDER (H): Primary | ICD-10-CM

## 2023-04-18 PROCEDURE — 90853 GROUP PSYCHOTHERAPY: CPT | Mod: GT,95 | Performed by: SOCIAL WORKER

## 2023-04-18 NOTE — GROUP NOTE
Process Group Note    PATIENT'S NAME: Nancy Montemayor  MRN:   [id number]  :   1960  ACCT. NUMBER: [id number]  DATE OF SERVICE: 23  START TIME:  9:00 AM  END TIME:  9:50 AM  FACILITATOR: Nancy Martins LICSW  TOPIC:  Process Group    Diagnoses:  296.32 (F33.1) Major Depressive Disorder, Recurrent Episode, Moderate         Service Modality:  Video Visit     Telemedicine Visit: The patient's condition can be safely assessed and treated via synchronous audio and visual telemedicine encounter.       Reason for Telemedicine Visit: Services only offered telehealth and due to COVID-19.     Originating Site (Patient Location): Patient's home     Distant Site (Provider Location): Provider Remote Setting- Home Office     Consent:  The patient/guardian has verbally consented to: the potential risks and benefits of telemedicine (video visit) versus in person care; bill my insurance or make self-payment for services provided; and responsibility for payment of non-covered services.      Patient would like the video invitation sent by:  My Chart     Mode of Communication:  Video Conference via Medical Zoom     As the provider I attest to compliance with applicable laws and regulations related to telemedicine.     [facility] 55+ Program  TRACK: A1    NUMBER OF PARTICIPANTS: 6          Data:    Session content: At the start of this group, patients were invited to check in by identifying themselves, describing their current emotional status, and identifying issues to address in this group.   Area(s) of treatment focus addressed in this session included Symptom Management, Personal Safety, Community Resources/Discharge Planning, Abstinence/Relapse Prevention, Develop / Improve Independent Living Skills, Develop Socialization / Interpersonal Relationship Skills and Physical Health .    Nancy reports less depressed and less anxious mood. Denies S/I or safety issues. Nancy processed needing to rest over the  weekend and was in bed till 4:00 pm one day. She feels refreshed. Nancy is using symptom management skills and educated a friend with [employer]. She will focus on socialization. She is also mindful of task management at home with domestic responsibilities. She has a weekly card group that plays [employer]. She will follow up with her psychiatrist on Friday for medication management. She is concerned about weight gain due a medication and is tracking her weight.       Therapeutic Interventions/Treatment Strategies:  Psychotherapist offered support, feedback and validation and reinforced use of skills. Treatment modalities used include Cognitive Behavioral Therapy.    Assessment:    Patient response:   Patient responded to session by accepting feedback, giving feedback, listening, focusing on goals, being attentive, accepting support and verbalizing understanding    Possible barriers to participation / learning include: and no barriers identified    Health Issues:   Yes: Sleep disturbance, Associated Psychological Distress  Chronic disease management, Associated Psychological Distress       Substance Use Review:   Substance Use: No active concerns identified.    Mental Status/Behavioral Observations  Appearance:   Appropriate   Eye Contact:   Good   Psychomotor Behavior: Normal   Attitude:   Cooperative  Interested Pleasant  Orientation:   All  Speech   Rate / Production: Normal    Volume:  Normal   Mood:    Less depressed and less anxious mood  Affect:    Appropriate   Thought Content:   Clear and Safety denies any current safety concerns including suicidal ideation, self-harm, and homicidal ideation  Thought Form:  Coherent  Goal Directed  Logical     Insight:    Good     Plan:     Safety Plan: No current safety concerns identified.  Recommended that patient call 911 or go to the local ED should there be a change in any of these risk factors.     Barriers to treatment: None identified    Patient Contracts (see media  tab):  None    Substance Use: Not addressed in session     Continue or Discharge: Patient will continue in 55+ Program (55+) as planned. Patient is likely to benefit from learning and using skills as they work toward the goals identified in their treatment plan.      Nancy Martins, [facility]  April 18, 2023

## 2023-04-18 NOTE — GROUP NOTE
Psychotherapy Group Note    PATIENT'S NAME: Nancy Montemayor  MRN:   [id number]  :   1960  ACCT. NUMBER: [id number]  DATE OF SERVICE: 23  START TIME: 10:00 AM  END TIME: 10:50 AM  FACILITATOR: Nancy Martins LICSW  TOPIC: MH EBP Group: Symptom Awareness  Service Modality:  Video Visit     Telemedicine Visit: The patient's condition can be safely assessed and treated via synchronous audio and visual telemedicine encounter.       Reason for Telemedicine Visit: Services only offered telehealth and due to COVID-19.     Originating Site (Patient Location): Patient's home     Distant Site (Provider Location): Provider Remote Setting- Home Office     Consent:  The patient/guardian has verbally consented to: the potential risks and benefits of telemedicine (video visit) versus in person care; bill my insurance or make self-payment for services provided; and responsibility for payment of non-covered services.      Patient would like the video invitation sent by:  My Chart     Mode of Communication:  Video Conference via Medical Zoom     As the provider I attest to compliance with applicable laws and regulations related to telemedicine.     [facility] [place] 55+ Program  TRACK: A1    NUMBER OF PARTICIPANTS: 6      Summary of Group / Topics Discussed:  Symptom Awareness: Mood Disorders: Patients received a general overview of mood disorders including anxiety disorders and  and how it relates to their current symptoms. The purpose is to promote understanding of their diagnoses and how it impacts their functioning. Patients reviewed their current awareness of symptoms and diagnoses. Patients received information regarding diagnoses, etiology, cultural, and environmental factors as well as impact on functioning.     Patient Session Goals / Objectives:    Discussed patient individual symptoms and experiences    Reviewed diagnostic criteria and etiology of diagnoses     Identified symptoms of anxiety, helpful coping  skills and the helpful roles of support needed      Patient Participation / Response:  Fully participated with the group by sharing personal reflections / insights and openly received / provided feedback with other participants.    Demonstrated understanding of topics discussed through group discussion and participation and Demonstrated understanding of how information regarding symptoms can assist in management of symptoms    Treatment Plan:  Patient has a current master individualized treatment plan.  See Epic treatment plan for more information.    Nancy Martins, LICSW

## 2023-04-19 ENCOUNTER — HOSPITAL ENCOUNTER (OUTPATIENT)
Dept: BEHAVIORAL HEALTH | Facility: CLINIC | Age: 63
Discharge: HOME | End: 2023-04-19
Attending: PSYCHIATRY & NEUROLOGY
Payer: COMMERCIAL

## 2023-04-19 DIAGNOSIS — F33.1 MODERATE EPISODE OF RECURRENT MAJOR DEPRESSIVE DISORDER (H): Primary | ICD-10-CM

## 2023-04-19 PROCEDURE — 99214 OFFICE O/P EST MOD 30 MIN: CPT | Mod: VID

## 2023-04-19 PROCEDURE — 90853 GROUP PSYCHOTHERAPY: CPT | Mod: GT,95 | Performed by: SOCIAL WORKER

## 2023-04-19 PROCEDURE — 90853 GROUP PSYCHOTHERAPY: CPT | Mod: GT,95

## 2023-04-19 RX ORDER — DULOXETIN HYDROCHLORIDE 30 MG/1
30 CAPSULE, DELAYED RELEASE ORAL DAILY
COMMUNITY
End: 2023-08-15

## 2023-04-19 RX ORDER — ARIPIPRAZOLE 2 MG/1
2 TABLET ORAL DAILY
Status: ON HOLD | COMMUNITY
End: 2023-08-24

## 2023-04-19 NOTE — GROUP NOTE
Process Group Note    PATIENT'S NAME: Nancy Montemayor  MRN:   [id number]  :   1960  ACCT. NUMBER: [id number]  DATE OF SERVICE: 23  START TIME:  9:00 AM  END TIME:  9:50 AM  FACILITATOR: Nancy Martins LICSW  TOPIC:  Process Group    Diagnoses:  296.32 (F33.1) Major Depressive Disorder, Recurrent Episode, Moderate         Service Modality:  Video Visit     Telemedicine Visit: The patient's condition can be safely assessed and treated via synchronous audio and visual telemedicine encounter.       Reason for Telemedicine Visit: Services only offered telehealth and due to COVID-19.     Originating Site (Patient Location): Patient's home     Distant Site (Provider Location): Provider Remote Setting- Home Office     Consent:  The patient/guardian has verbally consented to: the potential risks and benefits of telemedicine (video visit) versus in person care; bill my insurance or make self-payment for services provided; and responsibility for payment of non-covered services.      Patient would like the video invitation sent by:  My Chart     Mode of Communication:  Video Conference via Medical Zoom     As the provider I attest to compliance with applicable laws and regulations related to telemedicine.     [facility] 55+ Program  TRACK: A1    NUMBER OF PARTICIPANTS: 6          Data:    Session content: At the start of this group, patients were invited to check in by identifying themselves, describing their current emotional status, and identifying issues to address in this group.   Area(s) of treatment focus addressed in this session included Symptom Management, Personal Safety, Community Resources/Discharge Planning, Abstinence/Relapse Prevention, Develop / Improve Independent Living Skills, Develop Socialization / Interpersonal Relationship Skills and Physical Health .    Nancy reports less depressed and less anxious mood. Denies S/I or safety issues. She processed co-morbid physical health issues  with Fibromyalgia and elevated pain. She explored the process to be licensed in medicinal marijuana and the cost is too high so she is not going to apply for now. She continues to use THC gummies. Nancy will consult with her medication management provider on Friday concerning pain management. She continues to use symptom management skills. Nancy was able to go for a walk and connect with others for socialization. She connected with a good friend about a future trip. She will focus on task management at home today.       Therapeutic Interventions/Treatment Strategies:  Psychotherapist offered support, feedback and validation and reinforced use of skills. Treatment modalities used include Cognitive Behavioral Therapy.    Assessment:    Patient response:   Patient responded to session by accepting feedback, giving feedback, listening, focusing on goals, being attentive, accepting support and verbalizing understanding    Possible barriers to participation / learning include: and no barriers identified    Health Issues:   Yes: Pain, Associated Psychological Distress  Chronic disease management, Associated Psychological Distress     Improved sleep hygiene last night.    Hx of Fibromyalgia. Reports medication compliance.       Substance Use Review:   Substance Use: No active concerns identified.    Mental Status/Behavioral Observations  Appearance:   Appropriate   Eye Contact:   Good   Psychomotor Behavior: Normal   Attitude:   Cooperative  Interested Pleasant  Orientation:   All  Speech   Rate / Production: Normal    Volume:  Normal   Mood:    Less depressed and less anxious mood  Affect:    Appropriate   Thought Content:   Clear and Safety denies any current safety concerns including suicidal ideation, self-harm, and homicidal ideation  Thought Form:  Coherent  Goal Directed  Logical     Insight:    Good     Plan:     Safety Plan: No current safety concerns identified.  Recommended that patient call 911 or go to the  local ED should there be a change in any of these risk factors.     Barriers to treatment: None identified    Patient Contracts (see media tab):  None    Substance Use: Not addressed in session     Continue or Discharge: Patient will continue in 55+ Program (55+) as planned. Patient is likely to benefit from learning and using skills as they work toward the goals identified in their treatment plan.      Nancy Martisn, [facility] [place]  April 19, 2023

## 2023-04-19 NOTE — PROGRESS NOTES
"[facility]   Adult Mental Health Outpatient Programs  Provider Interval History Note    Program (track): 55+    PATIENT'S NAME: Nancy Montemayor  MRN:   [id number]  :   1960  ACCT. NUMBER: [id number]  DATE OF SERVICE: 23  CALL/VIDEO START TIME: 10:30  CALL/VIDEO END TIME: 11:00      Interval History:  \"I am doing good.\" Nancy presents today for follow-up and ongoing program supervision.   Endorses:    I think I am doing better    I see an individual therapist weekly, now moving to biweekly because er I am doing very well  o \"everyone should take therapy\"    Met psychiatrist ;ast week, I have another appointment on Friday  o Aripiprazole 2 mg   o Duloxetine 30 mg daily  o Denies side effects    Symptoms/Systems:    Sleep: is good    Appetite: is good,    Daily function/ADLs: no problem, I am trying    Hygiene: No problem    Socialization: I went out to lunch yesterday, planning to go to a movie on Saturday    Going for walk    Substance use:    HTC gummies for fibromyalgia    Reactions/thoughts about program:    I am enjoying the group, every one is helpful    Safety Assessment:    Suicidal ideation: denies current or recent suicidal ideation or behavior    Thoughts of non-suicidal self-injury: denied    Recent self-injurious behavior: denied    Homicidal ideation: denied    Other safety concerns: denied      Medications:  Current Outpatient Medications   Medication Sig Dispense Refill     ALPRAZolam (XANAX) 0.25 MG tablet Take 0.25 mg by mouth as needed       amLODIPine (NORVASC) 5 MG tablet Take 1 tablet by mouth daily       ARIPiprazole (ABILIFY) 2 MG tablet Take 2 mg by mouth daily       atorvastatin (LIPITOR) 20 MG tablet Take 20 mg by mouth       DULoxetine (CYMBALTA) 30 MG capsule Take 30 mg by mouth daily       PREMPRO 0.3-1.5 MG tablet Take 1 tablet by mouth daily at 2 pm       Vitamin D, Cholecalciferol, 25 MCG (1000 UT) CAPS        buPROPion (WELLBUTRIN XL) " 150 MG 24 hr tablet Take 1 tablet (150 mg) by mouth every morning (Patient not taking: Reported on 4/19/2023) 30 tablet 0     buPROPion (WELLBUTRIN XL) 300 MG 24 hr tablet Take 1 tablet (300 mg) by mouth every morning (Patient not taking: Reported on 4/19/2023)           The above list was reviewed and updated in [facility] or reviewed with patient today.     Patient is taking medications as prescribed and denies adverse effects      Laboratory Results:  Most recent labs reviewed. Pertinent updates/findings: None.     Metrics:  PHQ-9 scores:       3/16/2023    10:00 AM 3/16/2023     1:11 PM   PHQ-9 SCORE   PHQ-9 Total Score MyChart  15 (Moderately severe depression)   PHQ-9 Total Score 23 15       RAMAN-7 scores:       3/16/2023    10:00 AM 3/16/2023     1:14 PM   RAMAN-7 SCORE   Total Score  7 (mild anxiety)   Total Score 9 7        CSSR-S: [place] Suicide Severity Rating Scale (Short Version)      3/14/2023     9:30 PM   [place] Suicide Severity Rating (Short Version)   Over the past 2 weeks have you felt down, depressed, or hopeless? yes   Over the past 2 weeks have you had thoughts of killing yourself? yes   Have you ever attempted to kill yourself? no   Q1 Wished to be Dead (Past Month) yes   Q2 Suicidal Thoughts (Past Month) yes   Q3 Suicidal Thought Method yes   Q4 Suicidal Intent without Specific Plan no   Q5 Suicide Intent with Specific Plan yes   Q6 Suicide Behavior (Lifetime) no   Level of Risk per Screen high risk   High Risk Required Interventions On continuous in person observation   Required Interventions Belongings removed;Patient searched;Room made safe   Interventions DEC consulted           Mental Status Examination (limited due to video virtual visit format):  Vital Signs: There were no vitals taken for this virtual visit.  Appearance: adequately groomed, appears stated age, and in no apparent distress.  Attitude: cooperative   Eye Contact: good to the extent that can be determined in a video  "visit  Muscle Strength and Tone: no gross abnormalities based on remote observation  Psychomotor Behavior: Appropriate and Calm; no evidence of tardive dyskinesia, dystonia, or tics based on remote observation  Gait and Station: normal, no gross abnormalities based on remote observation  Speech: normal rate, production, volume, and rhythm of  Associations: No loosening of associations  Thought Process: coherent and goal directed  Thought Content: no evidence of suicidal ideation or homicidal ideation, no evidence of psychotic thought, no auditory hallucinations present and no visual hallucinations present  Mood: \"anxious and depressed\"  Affect: mood congruent  Insight: good  Judgment: intact, adequate for safety  Impulse Control: intact  Oriented to: time, place, person and situation  Attention Span and Concentration: normal  Language: Intact  Recent and Remote Memory: intact to interview. Not formally assessed. No amnesia.  Fund of Knowledge/Assessment of Intelligence: Average  Capacity of Activities of Daily Living: Independent, able to participate in programmatic care services.    Diagnosis/es:    ICD-10-CM    1. Moderate episode of recurrent major depressive disorder (H)  F33.1           Assessment/Plan:  Nancy presents today for follow up. Endorses depression overall improvement, sleep or appetite have improved, able to attend to activities of daily living and personal hygiene without problem.  Met psychiatrist last week, started on aripiprazole 2 mg next appointment on Friday.  Continues with duloxetine 30 mg daily and denies any adverse reaction.  Patient endorses benefit with psychotherapy.  Patient will continue with psychotherapy in IOP to sustain current gain and prevent depression symptoms relapse. Follow-up appointment in 4 weeks or sooner      296.32 (F33.1) Major Depressive Disorder, Recurrent Episode, Moderate  ? Overall improved   ? Engage in psychotherapy  ? Continue working with transition clinic " for medication management  ? Continue with current medication regimen  ? Working on improving sleep hygiene  ? Maintain a balanced diet  o Engage in physical activities as tolerated      Continue all other medications as reviewed per electronic medical record today    Continue therapy as planned:    Enrolled in 55+    Continues to benefit from services    Continues to meet criteria for this level of care    Patient would be at reasonable risk of requiring a higher level of care in the absence of current services.    I feel this patient does not meet criteria for an involuntary hold and is appropriate for treatment at an outpatient level of care.    Continue with individual therapist as appropriate    Safety plan reviewed:    To the Emergency Department as needed or call after hours crisis line at [phone] or [phone]. [place] Crisis Text Line: Text [place] to 060316 or Suicide LifeLine Chat: suicidepreiStoryTimeline.org/chat    Follow-up:     schedule an appointment with me or another program provider in approximately in 4 week(s) or sooner if needed.  Can speak with a staff member or call the appropriate program number (see below) to schedule    Follow up with outpatient provider(s) as planned or sooner if needed for acute medical concerns.    Questions or concerns:    Call program line with questions or concerns (see below)    VisitorsC[employer]et may be used to communicate with your provider, but this is not intended to be used for emergencies.      [facility] Adult Mental Health Program lines:  [facility]: [phone]  Dual Disorder: [phone]  Adult Day Treatment:  [phone]  55+/Intensive Outpatient: [phone]    Community Resources:    National Suicide Prevention Lifeline: 988 from any phone, or 806-755-8798 (TTY: 727.147.9985). Call anytime for help.  (www.suicidepreventionlifeline.org)  National [place] on Mental Illness (www.cyril.org): 664.545.7331 or 749-082-6784.   Mental Health  Association (www.mentalhealth.org): 539-383-2176 or 891-852-0415.  [place] Crisis Text Line: Text [place] to 953174  Suicide LifeLine Chat: suicidepreventionlifeline.org/chat    Treatment Objective(s) Addressed in This Session:  The purpose of today's virtual visit is for this writer to provide oversight of patient's care while receiving program services. Specific treatment goals addressed included personal safety, symptoms stabilization and management, wellness and mental health, and community resources/discharge planning.     This author or another program provider will follow up with the patient as noted above.     Patient agrees with the current plan of care.    JAZZY DONG CNP  4/19/23      Visit Details:  Type of service:  Video Visit    Start/End Time: see above    Originating Location (pt. Location): Home in [place]    Distant Location (provider location): Provider Remote Setting- Home Office    Platform used for Video Visit: Zoom    Physician has received verbal consent for a Video Visit from the patient? Yes    30 minutes spent on the date of the encounter doing chart review, patient visit, documentation and discussion with other provider(s)     This document completed in part using Dragon Medical One dictation software.  Please excuse any inadvertent word or phrase substitutions.

## 2023-04-19 NOTE — GROUP NOTE
Psychotherapy Group Note    PATIENT'S NAME: Nancy Montemayor  MRN:   [id number]  :   1960  ACCT. NUMBER: [id number]  DATE OF SERVICE: 23  START TIME: 11:00 AM  END TIME: 11:50 AM  FACILITATOR: Christie Alvares LGSW  TOPIC: MH EBP Group: Emotions Management  [place] [address]+ Program  TRACK: A1    NUMBER OF PARTICIPANTS: 6    Summary of Group / Topics Discussed:  Emotions Management: Anger: Patients explored and shared personal experiences associated with feelings of anger.  Group explored how these feelings develop, what they mean to each individual, and how to increase acceptance and usefulness of these feelings.  Discussed anger as a  secondary  emotion and reviewed ways to manage anger and challenge associated cognitive distortions. Group members worked to contextualize these concepts and promote healing.     Patient Session Goals / Objectives:    Discuss and review definitions and personal views/experiences with anger    Explore how feelings of anger impact functioning    Understand and practice strategies to manage difficult emotions and move towards healing    Demonstrate understanding of the feelings of anger    Verbalize how these emotions have impacted their lives/functioning    Verbalize of knowledge gained and possible interventions to manage feelings                                      Service Modality:  Video Visit     Telemedicine Visit: The patient's condition can be safely assessed and treated via synchronous audio and visual telemedicine encounter.      Reason for Telemedicine Visit: Services only offered telehealth    Originating Site (Patient Location): Patient's home    Distant Site (Provider Location): Provider Remote Setting- Home Office    Consent:  The patient/guardian has verbally consented to: the potential risks and benefits of telemedicine (video visit) versus in person care; bill my insurance or make self-payment for services provided; and responsibility for payment of  non-covered services.     Patient would like the video invitation sent by:  My Chart    Mode of Communication:  Video Conference via Medical Zoom    As the provider I attest to compliance with applicable laws and regulations related to telemedicine.                               Patient Participation / Response:  Fully participated with the group by sharing personal reflections / insights and openly received / provided feedback with other participants.    Demonstrated understanding of topics discussed through group discussion and participation, Self-aware of experiences with difficult emotions, and strategies to employ to manage them, Demonstrated knowledge of when to consider applying a variety of emotions management skills in daily life, Identified barriers to applying emotions management strategies, Identified strategies to overcome barriers to use of emotions management skills and Identified emotions management strategies that have helped maintain / improve symptoms in the past    Treatment Plan:  Patient has a current master individualized treatment plan.  See Epic treatment plan for more information.    Christie Alvares LGSW

## 2023-04-21 ENCOUNTER — HOSPITAL ENCOUNTER (OUTPATIENT)
Dept: BEHAVIORAL HEALTH | Facility: CLINIC | Age: 63
Discharge: HOME | End: 2023-04-21
Attending: PSYCHIATRY & NEUROLOGY
Payer: COMMERCIAL

## 2023-04-21 DIAGNOSIS — F33.1 MODERATE EPISODE OF RECURRENT MAJOR DEPRESSIVE DISORDER (H): Primary | ICD-10-CM

## 2023-04-21 PROCEDURE — 90853 GROUP PSYCHOTHERAPY: CPT | Mod: GT,95

## 2023-04-21 NOTE — GROUP NOTE
Psychotherapy Group Note    PATIENT'S NAME: Nancy Montemayor  MRN:   [id number]  :   1960  ACCT. NUMBER: [id number]  DATE OF SERVICE: 23  START TIME: 11:00 AM  END TIME: 11:50 AM  FACILITATOR: Christie Alvares LGSW  TOPIC: MH EBP Group: Mindfulness  [facility] 55+ Program  TRACK: A1    NUMBER OF PARTICIPANTS: 6    Summary of Group / Topics Discussed:  Mindfulness: Mindfulness Experiential: Patients received an overview on what mindfulness is and how mindfulness can benefit general health, mental health symptoms, and stressors. The history of mindfulness, its application to mental health therapies, and key concepts were also discussed. Patients discussed current awareness, knowledge, and practice of mindfulness skills. Patients also discussed barriers to mindfulness practice.    Patient Session Goals / Objectives:  Demonstrated and verbalized understanding of key mindfulness concepts  Identified when/how to use mindfulness skills  Resolved barriers to practicing mindfulness skills  Identified plan to use mindfulness skills in daily life                                       Service Modality:  Video Visit     Telemedicine Visit: The patient's condition can be safely assessed and treated via synchronous audio and visual telemedicine encounter.      Reason for Telemedicine Visit: Services only offered telehealth    Originating Site (Patient Location): Patient's home    Distant Site (Provider Location): Provider Remote Setting- Home Office    Consent:  The patient/guardian has verbally consented to: the potential risks and benefits of telemedicine (video visit) versus in person care; bill my insurance or make self-payment for services provided; and responsibility for payment of non-covered services.     Patient would like the video invitation sent by:  My Chart    Mode of Communication:  Video Conference via Medical Zoom    As the provider I attest to compliance with applicable laws and regulations related  to telemedicine.                               Patient Participation / Response:  {OP MH PROGRESS NOTE PATIENT PARTICIPATION:992019}    {OP MH PROGRESS NOTE PATIENT RESPONSE - MINDFULNESS:939710}    Treatment Plan:  Patient has {OP MH PROGRAMMATIC TX PLAN STATUS:871009}    JAYDEN Steinberg

## 2023-04-21 NOTE — GROUP NOTE
Process Group Note    PATIENT'S NAME: Nancy Montemayor  MRN:   [id number]  :   1960  ACCT. NUMBER: [id number]  DATE OF SERVICE: 23  START TIME:  9:00 AM  END TIME:  9:50 AM  FACILITATOR: Domenica Mckeon LGSW  TOPIC:  Process Group    Diagnoses:  296.32 (F33.1) Major Depressive Disorder, Recurrent Episode, Moderate    [facility] Day Treatment  TRACK: A1    NUMBER OF PARTICIPANTS: 6    Service Modality:  Video Visit     Telemedicine Visit: The patient's condition can be safely assessed and treated via synchronous audio and visual telemedicine encounter.      Reason for Telemedicine Visit: Services only offered telehealth    Originating Site (Patient Location): Patient's home    Distant Site (Provider Location): Provider Remote Setting- Home Office    Consent:  The patient/guardian has verbally consented to: the potential risks and benefits of telemedicine (video visit) versus in person care; bill my insurance or make self-payment for services provided; and responsibility for payment of non-covered services.     Patient would like the video invitation sent by:  My Chart    Mode of Communication:  Video Conference via Medical Zoom    As the provider I attest to compliance with applicable laws and regulations related to telemedicine.             Data:    Session content: At the start of this group, patients were invited to check in by identifying themselves, describing their current emotional status, and identifying issues to address in this group.   Area(s) of treatment focus addressed in this session included Symptom Management, Personal Safety and Community Resources/Discharge Planning.  Client reported mood is good   Client denied safety concerns, including SI and SIB. Client denies any chemical use.  Client is taking medications as prescribed. Client stated she is going to cook and see friends this weekend. She is going to see her psychiatrist for a medication check. Client's  goal is laundry. Client will apply the following skills: observe alexysoguhalfred.  Client is grateful for group and friends. Peers offered supportive feedback and validation.      Therapeutic Interventions/Treatment Strategies:  Psychotherapist offered support, feedback and validation. Treatment modalities used include Cognitive Behavioral Therapy. Interventions include Mindfulness: Facilitated discussion of when/how to use mindfulness skills to benefit general health, mental health symptoms, and stressors.    Assessment:    Patient response:   Patient responded to session by accepting feedback, giving feedback, listening and focusing on goals    Possible barriers to participation / learning include: and no barriers identified    Health Issues:   None reported       Substance Use Review:   Substance Use: No active concerns identified.    Mental Status/Behavioral Observations  Appearance:   Appropriate   Eye Contact:   Good   Psychomotor Behavior: Normal   Attitude:   Cooperative  Interested Friendly Pleasant  Orientation:   All  Speech   Rate / Production: Normal/ Responsive   Volume:  Normal   Mood:    Depressed   Affect:    Appropriate   Thought Content:   Clear and Safety denies any current safety concerns including suicidal ideation, self-harm, and homicidal ideation  Thought Form:  Coherent  Logical     Insight:    Good     Plan:     Safety Plan: No current safety concerns identified.  Recommended that patient call 911 or go to the local ED should there be a change in any of these risk factors.     Barriers to treatment: None identified    Patient Contracts (see media tab):  None    Substance Use: Not addressed in session     Continue or Discharge: Patient will continue in 55+ Program (55+) as planned. Patient is likely to benefit from learning and using skills as they work toward the goals identified in their treatment plan.      Domenica Mckeon, [facility]  April 21, 2023

## 2023-04-21 NOTE — GROUP NOTE
Psychotherapy Group Note    PATIENT'S NAME: Nancy Montemayor  MRN:   [id number]  :   1960  ACCT. NUMBER: [id number]  DATE OF SERVICE: 23  START TIME: 11:00 AM  END TIME: 11:50 AM  FACILITATOR: Christie Alvares LGSW  TOPIC: MH EBP Group: Coping Skills  [facility] 55+ Program  TRACK: A1    NUMBER OF PARTICIPANTS: 6    Summary of Group / Topics Discussed:  Coping Skills: Discharge Planning: Patients discussed and increased understanding of how anticipating and planning for possible increased symptoms is a proactive way to reduce the likelihood of relapse.  Patients shared individualized factors that may lead to increased symptoms and decompensation in functioning.  Each patient developed a relapse prevention plan designed to help them recognize when they may have increasing symptoms requiring them to take action and notify their key supports and care team.      Patient Session Goals / Objectives:    Identify and understand what factors may contribute to symptom relapse.    Identify actions that may be taken to manage increased symptoms/stressors.    Create an individualized written relapse plan    Problem solve barriers to plan creation and implementation    Share relapse plan with key support people                                      Service Modality:  Video Visit     Telemedicine Visit: The patient's condition can be safely assessed and treated via synchronous audio and visual telemedicine encounter.      Reason for Telemedicine Visit: Services only offered telehealth    Originating Site (Patient Location): Patient's home    Distant Site (Provider Location): Provider Remote Setting- Home Office    Consent:  The patient/guardian has verbally consented to: the potential risks and benefits of telemedicine (video visit) versus in person care; bill my insurance or make self-payment for services provided; and responsibility for payment of non-covered services.     Patient would like the video invitation  sent by:  My Chart    Mode of Communication:  Video Conference via Medical Zoom    As the provider I attest to compliance with applicable laws and regulations related to telemedicine.                                 Patient Participation / Response:  Fully participated with the group by sharing personal reflections / insights and openly received / provided feedback with other participants.    Demonstrated understanding of topics discussed through group discussion and participation and Expressed understanding of the relevance / importance of coping skills at distressing times in life    Treatment Plan:  Patient has a current master individualized treatment plan.  See Epic treatment plan for more information.    Christie Alvares LGSW

## 2023-04-21 NOTE — GROUP NOTE
Psychotherapy Group Note    PATIENT'S NAME: Nancy Montemayor  MRN:   [id number]  :   1960  ACCT. NUMBER: [id number]  DATE OF SERVICE: 23  START TIME: 10:00 AM  END TIME: 10:50 AM  FACILITATOR: Domenica Mckeon LGSW  TOPIC: MH EBP Group: Emotions Management  [facility] 55+ Program  TRACK: A1    NUMBER OF PARTICIPANTS: 6    Summary of Group / Topics Discussed:  Weekend Planning    Patient Session Goals / Objectives:    Learn the process of creating balance in weekend plans that support holistic health.    Create plans for self-care      Patient Participation / Response:  Fully participated with the group by sharing personal reflections / insights and openly received / provided feedback with other participants.    Demonstrated understanding of topics discussed through group discussion and participation, Expressed understanding of the relevance / importance of emotions management skills at distressing times in life and Self-aware of experiences with difficult emotions, and strategies to employ to manage them    Treatment Plan:  Patient has a current master individualized treatment plan.  See Epic treatment plan for more information.    JAYDEN Rao

## 2023-04-24 ENCOUNTER — TELEPHONE (OUTPATIENT)
Dept: BEHAVIORAL HEALTH | Facility: CLINIC | Age: 63
End: 2023-04-24
Payer: COMMERCIAL

## 2023-04-24 NOTE — TELEPHONE ENCOUNTER
[facility] RN received refill request from [id number] - [place], [place] - [address] [facility] [address] for   buPROPion (WELLBUTRIN XL) 150 MG 24 hr tablet 30 tablet 0 3/29/2023  No   Sig - Route: Take 1 tablet (150 mg) by mouth every morning - Oral   Patient not taking: Reported on 4/19/2023        Sent to pharmacy as: buPROPion HCl ER (XL) 150 MG Oral Tablet Extended Release 24 Hour (WELLBUTRIN XL)   Class: E-Prescribe   Order: [id number]   E-Prescribing Status: Receipt confirmed by pharmacy (3/29/2023  1:13 PM CDT)     [facility] RN called patient and confirmed with Nancy that she kept next level of care appointment and that her care transferred to [facility].    Next level of care: Kept  To: Home with Med Management   Provider(s)Ameena Romero with [facility]   [address]   Date/Time 3/22 @ 10am    RN denied refill request indicating: patient care transferred to [facility] provider Ameena Romero MD.

## 2023-04-25 ENCOUNTER — HOSPITAL ENCOUNTER (OUTPATIENT)
Dept: BEHAVIORAL HEALTH | Facility: CLINIC | Age: 63
Discharge: HOME | End: 2023-04-25
Attending: PSYCHIATRY & NEUROLOGY
Payer: COMMERCIAL

## 2023-04-25 DIAGNOSIS — F33.1 MODERATE EPISODE OF RECURRENT MAJOR DEPRESSIVE DISORDER (H): Primary | ICD-10-CM

## 2023-04-25 PROCEDURE — 90853 GROUP PSYCHOTHERAPY: CPT | Mod: GT,95

## 2023-04-25 PROCEDURE — 90853 GROUP PSYCHOTHERAPY: CPT | Mod: GT,95 | Performed by: SOCIAL WORKER

## 2023-04-25 NOTE — GROUP NOTE
Psychotherapy Group Note    PATIENT'S NAME: Nancy Montemayor  MRN:   [id number]  :   1960  ACCT. NUMBER: [id number]  DATE OF SERVICE: 23  START TIME: 10:00 AM  END TIME: 10:50 AM  FACILITATOR: Nancy Martins LICSW  TOPIC: MH EBP Group: Cognitive Restructuring  Service Modality:  Video Visit     Telemedicine Visit: The patient's condition can be safely assessed and treated via synchronous audio and visual telemedicine encounter.       Reason for Telemedicine Visit: Services only offered telehealth and due to COVID-19.     Originating Site (Patient Location): Patient's home     Distant Site (Provider Location): Provider Remote Setting- Home Office     Consent:  The patient/guardian has verbally consented to: the potential risks and benefits of telemedicine (video visit) versus in person care; bill my insurance or make self-payment for services provided; and responsibility for payment of non-covered services.      Patient would like the video invitation sent by:  My Chart     Mode of Communication:  Video Conference via Medical Zoom     As the provider I attest to compliance with applicable laws and regulations related to telemedicine.     [employer] 55+ Program  TRACK: A1    NUMBER OF PARTICIPANTS: 8    Summary of Group / Topics Discussed:  Cognitive Restructuring: Distortions: Patients received an overview of how to identify common cognitive distortions. Patients will explore alternatives to cognitive distortions and practice challenging their negative thought patterns. The goal is to help patients target modify ineffective thought patterns.     Patient Session Goals / Objectives:    Familiarized self with ineffective / unhealthy thoughts and how they develop.      Explored impact of ineffective thoughts / distortions on mood and activity    Formulated new neutral/positive alternatives to challenge less helpful / ineffective thoughts.    Practiced and plan to apply in daily  life               Patient Participation / Response:  Fully participated with the group by sharing personal reflections / insights and openly received / provided feedback with other participants.    Demonstrated understanding of topics discussed through group discussion and participation and Practiced skills in session    Treatment Plan:  Patient has a current master individualized treatment plan.  See Epic treatment plan for more information.    Nancy Martins, LICSW

## 2023-04-25 NOTE — GROUP NOTE
Process Group Note    PATIENT'S NAME: Nancy Montemayor  MRN:   [id number]  :   1960  ACCT. NUMBER: [id number]  DATE OF SERVICE: 23  START TIME:  9:00 AM  END TIME:  9:50 AM  FACILITATOR: Nancy Martins LICSW  TOPIC:  Process Group    Diagnoses:  296.32 (F33.1) Major Depressive Disorder, Recurrent Episode, Moderate         Service Modality:  Video Visit     Telemedicine Visit: The patient's condition can be safely assessed and treated via synchronous audio and visual telemedicine encounter.       Reason for Telemedicine Visit: Services only offered telehealth and due to COVID-19.     Originating Site (Patient Location): Patient's home     Distant Site (Provider Location): Provider Remote Setting- Home Office     Consent:  The patient/guardian has verbally consented to: the potential risks and benefits of telemedicine (video visit) versus in person care; bill my insurance or make self-payment for services provided; and responsibility for payment of non-covered services.      Patient would like the video invitation sent by:  My Chart     Mode of Communication:  Video Conference via Medical Zoom     As the provider I attest to compliance with applicable laws and regulations related to telemedicine.     [facility] 55+ Program  TRACK: A1    NUMBER OF PARTICIPANTS: 8          Data:    Session content: At the start of this group, patients were invited to check in by identifying themselves, describing their current emotional status, and identifying issues to address in this group.   Area(s) of treatment focus addressed in this session included Symptom Management, Personal Safety, Community Resources/Discharge Planning, Abstinence/Relapse Prevention, Develop / Improve Independent Living Skills and Develop Socialization / Interpersonal Relationship Skills.    Nancy reports less depressed and less anxious mood. Denies S/I or safety issues. She had an increase need for sleep over the weekend of 16 hours.  She will continues to monitor her sleep hygiene. Nancy continues to make social connections with friends and family. She is finding the balance within productivity. She was able to enjoy going to dinner and movie with friends over the weekend. She has a card group on Mondays. She is finding lifestyle balance between productivity,  Self care and socialization.       Therapeutic Interventions/Treatment Strategies:  Psychotherapist offered support, feedback and validation and reinforced use of skills. Treatment modalities used include Cognitive Behavioral Therapy.    Assessment:    Patient response:   Patient responded to session by accepting feedback, giving feedback, listening, focusing on goals, being attentive, accepting support and verbalizing understanding    Possible barriers to participation / learning include: and no barriers identified    Health Issues:   Yes: Sleep disturbance, Associated Psychological Distress  Chronic disease management, Associated Psychological Distress     Medication compliance.       Substance Use Review:   Substance Use: No active concerns identified.    Mental Status/Behavioral Observations  Appearance:   Appropriate   Eye Contact:   Good   Psychomotor Behavior: Normal   Attitude:   Cooperative  Interested  Orientation:   All  Speech   Rate / Production: Normal    Volume:  Normal   Mood:    Less depressed and less anxious  Affect:    Appropriate   Thought Content:   Clear and Safety denies any current safety concerns including suicidal ideation, self-harm, and homicidal ideation  Thought Form:  Coherent  Goal Directed  Logical     Insight:    Good     Plan:     Safety Plan: No current safety concerns identified.  Recommended that patient call 911 or go to the local ED should there be a change in any of these risk factors.     Barriers to treatment: None identified    Patient Contracts (see media tab):  None    Substance Use: Not addressed in session     Continue or Discharge: Patient  will continue in 55+ Program (55+) as planned. Patient is likely to benefit from learning and using skills as they work toward the goals identified in their treatment plan.      Nancy Martins, [facility]  April 25, 2023

## 2023-04-25 NOTE — GROUP NOTE
Psychotherapy Group Note    PATIENT'S NAME: Nancy Montemayor  MRN:   [id number]  :   1960  ACCT. NUMBER: [id number]  DATE OF SERVICE: 23  START TIME: 11:00 AM  END TIME: 11:50 AM  FACILITATOR: Christie Alvares LGSW  TOPIC: MH EBP Group: Specialty Awareness  [facility] 55+ Program  TRACK: A1    NUMBER OF PARTICIPANTS: 8    Summary of Group / Topics Discussed:  Specialty Topics: Hope: The topic of hope was presented in order to help patients better understand the symptoms of hopelessness and how to become more hopeful. Patients discussed their current awareness of the topic and relevance to their functioning. Individual experiences with symptoms and treatment options were also discussed. Patients explored options for ongoing/future treatment and symptom management.      Patient Session Goals / Objectives:    Discussed definition of hopelessness    Discussed how hopelessness impacts functioning    Set a plan to utilize skills to reduce hopelessness                                    Service Modality:  Video Visit     Telemedicine Visit: The patient's condition can be safely assessed and treated via synchronous audio and visual telemedicine encounter.      Reason for Telemedicine Visit: Services only offered telehealth    Originating Site (Patient Location): Patient's home    Distant Site (Provider Location): [facility]: [facility]    Consent:  The patient/guardian has verbally consented to: the potential risks and benefits of telemedicine (video visit) versus in person care; bill my insurance or make self-payment for services provided; and responsibility for payment of non-covered services.     Patient would like the video invitation sent by:  My Chart    Mode of Communication:  Video Conference via Medical Zoom    As the provider I attest to compliance with applicable laws and regulations related to telemedicine.                               Patient Participation / Response:  Fully  participated with the group by sharing personal reflections / insights and openly received / provided feedback with other participants.    Demonstrated understanding of topics discussed through group discussion and participation, Identified / Expressed readiness to act on skill suggestions discussed in topic, Verbalized understanding of ways to proactively manage illness and Identified plan to address barriers to practicing skills discussed in topic    Treatment Plan:  Patient has a current master individualized treatment plan.  See Epic treatment plan for more information.    Christie Alvares LGSW

## 2023-04-28 ENCOUNTER — HOSPITAL ENCOUNTER (OUTPATIENT)
Dept: BEHAVIORAL HEALTH | Facility: CLINIC | Age: 63
Discharge: HOME | End: 2023-04-28
Attending: PSYCHIATRY & NEUROLOGY
Payer: COMMERCIAL

## 2023-04-28 DIAGNOSIS — F33.1 MODERATE EPISODE OF RECURRENT MAJOR DEPRESSIVE DISORDER (H): Primary | ICD-10-CM

## 2023-04-28 PROCEDURE — 90853 GROUP PSYCHOTHERAPY: CPT | Mod: GT,95 | Performed by: SOCIAL WORKER

## 2023-04-28 PROCEDURE — 90853 GROUP PSYCHOTHERAPY: CPT | Mod: GT,95

## 2023-04-28 NOTE — GROUP NOTE
Process Group Note    PATIENT'S NAME: Nancy Montemayor  MRN:   [id number]  :   1960  ACCT. NUMBER: [id number]  DATE OF SERVICE: 23  START TIME:  9:00 AM  END TIME:  9:50 AM  FACILITATOR: Nancy Martins LICSW  TOPIC:  Process Group    Diagnoses:  296.32 (F33.1) Major Depressive Disorder, Recurrent Episode, Moderate         Service Modality:  Video Visit     Telemedicine Visit: The patient's condition can be safely assessed and treated via synchronous audio and visual telemedicine encounter.       Reason for Telemedicine Visit: Services only offered telehealth and due to COVID-19.     Originating Site (Patient Location): Patient's home     Distant Site (Provider Location): Provider Remote Setting- Home Office     Consent:  The patient/guardian has verbally consented to: the potential risks and benefits of telemedicine (video visit) versus in person care; bill my insurance or make self-payment for services provided; and responsibility for payment of non-covered services.      Patient would like the video invitation sent by:  My Chart     Mode of Communication:  Video Conference via Medical Zoom     As the provider I attest to compliance with applicable laws and regulations related to telemedicine.     [facility] 55+ Program  TRACK: A1    NUMBER OF PARTICIPANTS: 8          Data:    Session content: At the start of this group, patients were invited to check in by identifying themselves, describing their current emotional status, and identifying issues to address in this group.   Area(s) of treatment focus addressed in this session included Symptom Management, Personal Safety, Community Resources/Discharge Planning, Abstinence/Relapse Prevention, Develop / Improve Independent Living Skills and Develop Socialization / Interpersonal Relationship Skills.    Nancy reports depressed mood. She feels fatigue and increase pain with Fibromyalgia symptoms. Denies S/I or safety issues. She has been fairly  productive with socialization this week including spending time with 2 different set of friends, a  for her niece, the [employer] [facility]. She is learning the balance between lifestyle balance, self care. She is going to have dinner with her mom. She will have R and R for the weekend.      Therapeutic Interventions/Treatment Strategies:  Psychotherapist offered support, feedback and validation and reinforced use of skills. Treatment modalities used include Cognitive Behavioral Therapy.    Assessment:    Patient response:   Patient responded to session by accepting feedback, giving feedback, listening, focusing on goals, being attentive, accepting support and verbalizing understanding    Possible barriers to participation / learning include: and no barriers identified    Health Issues:   Yes: Pain, Associated Psychological Distress  Chronic disease management, Associated Psychological Distress  Hx of Fibromyalgia.    Medication compliance.       Substance Use Review:   Substance Use: No active concerns identified.    Mental Status/Behavioral Observations  Appearance:   Appropriate   Eye Contact:   Good   Psychomotor Behavior: Normal   Attitude:   Cooperative  Interested Pleasant  Orientation:   All  Speech   Rate / Production: Normal    Volume:  Normal   Mood:    Depressed   Affect:    Appropriate   Thought Content:   Clear and Safety denies any current safety concerns including suicidal ideation, self-harm, and homicidal ideation  Thought Form:  Coherent  Goal Directed  Logical     Insight:    Good     Plan:     Safety Plan: No current safety concerns identified.  Recommended that patient call 911 or go to the local ED should there be a change in any of these risk factors.     Barriers to treatment: None identified    Patient Contracts (see media tab):  None    Substance Use: Not addressed in session     Continue or Discharge: Patient will continue in 55+ Program (55+) as planned. Patient is likely to  benefit from learning and using skills as they work toward the goals identified in their treatment plan.      Nancy Martins, [facility]  April 28, 2023

## 2023-04-28 NOTE — GROUP NOTE
Psychotherapy Group Note    PATIENT'S NAME: Nancy Montemayor  MRN:   [id number]  :   1960  ACCT. NUMBER: [id number]  DATE OF SERVICE: 23  START TIME: 11:00 AM  END TIME: 11:50 AM  FACILITATOR: Christie Alvares LGSW  TOPIC: MH EBP Group: Self-Awareness  [facility] 55+ Program  TRACK: A1    NUMBER OF PARTICIPANTS: 8    Summary of Group / Topics Discussed:  Self-Awareness: Positive Affirmations: Topic focused on assisting patients in creating positive affirmations and relating them to the management of mental health symptoms. Patients discussed the benefits of acknowledging their personal strengths and their impact on mood improvement, mindfulness, and perspective. Patients worked to increase time spent on recognition and appreciation of what is positive and working in their lives. The goal is to reduce rumination and negative thinking resulting in increased mindfulness and resilience. Patients will work to put skills into practice and problem-solve barriers.     Patient Session Goals / Objectives:    Identified personal strengths    Created positive self-affirmations    Identified barriers to recognition of personal strengths    Verbalized understanding of strategies to increase use of their strengths in management of daily symptoms                                      Service Modality:  Video Visit     Telemedicine Visit: The patient's condition can be safely assessed and treated via synchronous audio and visual telemedicine encounter.      Reason for Telemedicine Visit: Services only offered telehealth    Originating Site (Patient Location): Patient's home    Distant Site (Provider Location): Provider Remote Setting- Home Office    Consent:  The patient/guardian has verbally consented to: the potential risks and benefits of telemedicine (video visit) versus in person care; bill my insurance or make self-payment for services provided; and responsibility for payment of non-covered services.     Patient  would like the video invitation sent by:  My Chart    Mode of Communication:  Video Conference via Medical Zoom    As the provider I attest to compliance with applicable laws and regulations related to telemedicine.                               Patient Participation / Response:  Fully participated with the group by sharing personal reflections / insights and openly received / provided feedback with other participants.    Demonstrated understanding of topics discussed through group discussion and participation, Identified / Expressed readiness to act intentionally, increase self-compassion, promote personal growth, Identified plan to address barriers to practicing skills that promote self-awareness  and Practiced skills in session    Treatment Plan:  Patient has a current master individualized treatment plan.  See Epic treatment plan for more information.    Christie Alvares LGSW

## 2023-04-28 NOTE — GROUP NOTE
Psychotherapy Group Note    PATIENT'S NAME: Nancy Montemayor  MRN:   [id number]  :   1960  ACCT. NUMBER: [id number]  DATE OF SERVICE: 23  START TIME: 10:00 AM  END TIME: 10:50 AM  FACILITATOR: Nancy Martins LICSW  TOPIC: MH EBP Group: Symptom Awareness  Service Modality:  Video Visit     Telemedicine Visit: The patient's condition can be safely assessed and treated via synchronous audio and visual telemedicine encounter.       Reason for Telemedicine Visit: Services only offered telehealth and due to COVID-19.     Originating Site (Patient Location): Patient's home     Distant Site (Provider Location): Provider Remote Setting- Home Office     Consent:  The patient/guardian has verbally consented to: the potential risks and benefits of telemedicine (video visit) versus in person care; bill my insurance or make self-payment for services provided; and responsibility for payment of non-covered services.      Patient would like the video invitation sent by:  My Chart     Mode of Communication:  Video Conference via Medical Zoom     As the provider I attest to compliance with applicable laws and regulations related to telemedicine.     [place] [facility] 55+ Program  TRACK: A1    NUMBER OF PARTICIPANTS: 8    Summary of Group / Topics Discussed:  Symptom Awareness: Mood Disorders: Patients received a general overview of mood disorders including depressive disorders and how it relates to their current symptoms. The purpose is to promote understanding of their diagnoses and how it impacts their functioning. Patients reviewed their current awareness of symptoms and diagnoses. Patients received information regarding diagnoses, etiology, cultural, and environmental factors as well as impact on functioning.     Patient Session Goals / Objectives:    Discussed patient individual symptoms and experiences    Reviewed diagnostic criteria and etiology of diagnoses     Educated and discussed the benefits of Coping  cards as a relapse prevention tool.      Patient Participation / Response:  Fully participated with the group by sharing personal reflections / insights and openly received / provided feedback with other participants.    Demonstrated understanding of topics discussed through group discussion and participation and Demonstrated understanding of how information regarding symptoms can assist in management of symptoms    Treatment Plan:  Patient has a current master individualized treatment plan.  See Epic treatment plan for more information.    Nancy Martins, LICSW

## 2023-05-02 ENCOUNTER — HOSPITAL ENCOUNTER (OUTPATIENT)
Dept: BEHAVIORAL HEALTH | Facility: CLINIC | Age: 63
Discharge: HOME | End: 2023-05-02
Attending: PSYCHIATRY & NEUROLOGY
Payer: COMMERCIAL

## 2023-05-02 DIAGNOSIS — F33.1 MODERATE EPISODE OF RECURRENT MAJOR DEPRESSIVE DISORDER (H): Primary | ICD-10-CM

## 2023-05-02 PROCEDURE — 90853 GROUP PSYCHOTHERAPY: CPT | Mod: GT,95 | Performed by: SOCIAL WORKER

## 2023-05-02 PROCEDURE — 90853 GROUP PSYCHOTHERAPY: CPT | Mod: GT,95

## 2023-05-02 NOTE — GROUP NOTE
Psychotherapy Group Note    PATIENT'S NAME: Nancy Montemayor  MRN:   [id number]  :   1960  ACCT. NUMBER: [id number]  DATE OF SERVICE: 23  START TIME: 10:00 AM  END TIME: 10:50 AM  FACILITATOR: Nancy Martins LICSW  TOPIC: MH EBP Group: Behavioral Activation  Service Modality:  Video Visit     Telemedicine Visit: The patient's condition can be safely assessed and treated via synchronous audio and visual telemedicine encounter.       Reason for Telemedicine Visit: Services only offered telehealth and due to COVID-19.     Originating Site (Patient Location): Patient's home     Distant Site (Provider Location): Provider Remote Setting- Home Office     Consent:  The patient/guardian has verbally consented to: the potential risks and benefits of telemedicine (video visit) versus in person care; bill my insurance or make self-payment for services provided; and responsibility for payment of non-covered services.      Patient would like the video invitation sent by:  My Chart     Mode of Communication:  Video Conference via Medical Zoom     As the provider I attest to compliance with applicable laws and regulations related to telemedicine.     [facility] 55+ Program  TRACK: A1    NUMBER OF PARTICIPANTS: 6    Summary of Group / Topics Discussed:  Behavioral Activation: The Change Process - Behavior Change: Patients explored the process and types of change, including but not limited to, theories of change, steps to making change, methods of changing behavior, and potential barriers.  Patients worked to identify what changes may benefit their daily lives, and work towards a plan to implement change.      Patient Session Goals / Objectives:    Demonstrate understanding of the change process.      Identify positive and negative behavioral patterns.    Make plans to track and implement changes and share experiences in group.    Identify personal barriers to change      Patient Participation /  Response:  Fully participated with the group by sharing personal reflections / insights and openly received / provided feedback with other participants.    Demonstrated understanding of topics discussed through group discussion and participation and Practiced skills in session    Treatment Plan:  Patient has a current master individualized treatment plan.  See Epic treatment plan for more information.    Nancy Martins, FRANCESCASW

## 2023-05-02 NOTE — GROUP NOTE
Process Group Note    PATIENT'S NAME: Nancy Montemayor  MRN:   [id number]  :   1960  ACCT. NUMBER: [id number]  DATE OF SERVICE: 23  START TIME:  9:00 AM  END TIME:  9:50 AM  FACILITATOR: Nancy Martins LICSW  TOPIC:  Process Group    Diagnoses:  296.32 (F33.1) Major Depressive Disorder, Recurrent Episode, Moderate         Service Modality:  Video Visit     Telemedicine Visit: The patient's condition can be safely assessed and treated via synchronous audio and visual telemedicine encounter.       Reason for Telemedicine Visit: Services only offered telehealth and due to COVID-19.     Originating Site (Patient Location): Patient's home     Distant Site (Provider Location): Provider Remote Setting- Home Office     Consent:  The patient/guardian has verbally consented to: the potential risks and benefits of telemedicine (video visit) versus in person care; bill my insurance or make self-payment for services provided; and responsibility for payment of non-covered services.      Patient would like the video invitation sent by:  My Chart     Mode of Communication:  Video Conference via Medical Zoom     As the provider I attest to compliance with applicable laws and regulations related to telemedicine.     [facility] 55+ Program  TRACK: A1    NUMBER OF PARTICIPANTS: 6          Data:    Session content: At the start of this group, patients were invited to check in by identifying themselves, describing their current emotional status, and identifying issues to address in this group.   Area(s) of treatment focus addressed in this session included Symptom Management, Personal Safety, Community Resources/Discharge Planning, Abstinence/Relapse Prevention, Develop / Improve Independent Living Skills and Develop Socialization / Interpersonal Relationship Skills.    Nancy reports less depressed and less anxious mood. Denies S/I or safety issues. Nancy processed how she structured her time over the weekend to  have more lifestyle balance. She was aware that last week she was too over productive. She found a nice balance this weekend. She played black salomón at [employer], spent time with her dad for his birthday, played cards with friends. Her mom has COVID and is quarantined at the nursing home. She will consult with her outpatient therapist whom she has seen every other week. Writer provided directions to the hospital for the transition into in person on 5/12/2023.       Therapeutic Interventions/Treatment Strategies:  Psychotherapist offered support, feedback and validation and reinforced use of skills. Treatment modalities used include Cognitive Behavioral Therapy.    Assessment:    Patient response:   Patient responded to session by accepting feedback, giving feedback, listening, focusing on goals, being attentive, accepting support and verbalizing understanding    Possible barriers to participation / learning include: and no barriers identified    Health Issues:   Yes: Chronic disease management, No Psychological Distress     Reports medication compliance.       Substance Use Review:   Substance Use: No active concerns identified.    Mental Status/Behavioral Observations  Appearance:   Appropriate   Eye Contact:   Good   Psychomotor Behavior: Normal   Attitude:   Cooperative  Interested Pleasant  Orientation:   All  Speech   Rate / Production: Normal    Volume:  Normal   Mood:    Less depressed and less anxious mood  Affect:    Appropriate   Thought Content:   Clear and Safety denies any current safety concerns including suicidal ideation, self-harm, and homicidal ideation  Thought Form:  Coherent  Goal Directed  Logical     Insight:    Good     Plan:     Safety Plan: No current safety concerns identified.  Recommended that patient call 911 or go to the local ED should there be a change in any of these risk factors.     Barriers to treatment: None identified    Patient Contracts (see media tab):  None    Substance  Use: Not addressed in session     Continue or Discharge: Patient will continue in 55+ Program (55+) as planned. Patient is likely to benefit from learning and using skills as they work toward the goals identified in their treatment plan.      Nancy Martins, [facility]  May 2, 2023

## 2023-05-02 NOTE — GROUP NOTE
Psychotherapy Group Note    PATIENT'S NAME: Nancy Montemayor  MRN:   [id number]  :   1960  ACCT. NUMBER: [id number]  DATE OF SERVICE: 23  START TIME: 11:00 AM  END TIME: 11:50 AM  FACILITATOR: Christie Alvares LGSW  TOPIC: MH EBP Group: Behavioral Activation  [facility] 55+ Program  TRACK: A1    NUMBER OF PARTICIPANTS: 6    Summary of Group / Topics Discussed:  Behavioral Activation: Motivation and Procrastination: Patients explored how they currently spend their time, identifying thoughts and feelings that are motivating and serve to increase desired behaviors.  They also examined behaviors that contribute to procrastination.  Different types of procrastination behaviors were identified, and strategies to reduce individual procrastination and increase motivation were explored and practiced.  Patients identified ways to increase goal-directed activities to enhance mood and reduce symptoms.        Patient Session Goals / Objectives:    Identify current patterns of procrastination behavior and how they influence thoughts and moods, and inhibit motivation.    Identify behaviors that can be implemented that contribute to improving thoughts and feelings, motivation, and reduce symptoms.    Identify and develop a plan to increase activities that promote a sense of accomplishment and competence.    Practice scheduling positive activities / behaviors into daily routines.                                      Service Modality:  Video Visit     Telemedicine Visit: The patient's condition can be safely assessed and treated via synchronous audio and visual telemedicine encounter.      Reason for Telemedicine Visit: Services only offered telehealth    Originating Site (Patient Location): Patient's home    Distant Site (Provider Location): Provider Remote Setting- Home Office    Consent:  The patient/guardian has verbally consented to: the potential risks and benefits of telemedicine (video visit) versus in person  care; bill my insurance or make self-payment for services provided; and responsibility for payment of non-covered services.     Patient would like the video invitation sent by:  My Chart    Mode of Communication:  Video Conference via Medical Zoom    As the provider I attest to compliance with applicable laws and regulations related to telemedicine.                               Patient Participation / Response:  Fully participated with the group by sharing personal reflections / insights and openly received / provided feedback with other participants.    Demonstrated understanding of topics discussed through group discussion and participation, Expressed understanding of the relationship between behaviors, thoughts, and feelings, Shared experiences and challenges with making behavioral changes, Identified barriers to change, Identified / Expressed personal readiness to make behavioral change and Identified ways to increase goal directed activities    Treatment Plan:  Patient has a current master individualized treatment plan.  See Epic treatment plan for more information.    Christie Alvares LGSW

## 2023-05-03 ENCOUNTER — HOSPITAL ENCOUNTER (OUTPATIENT)
Dept: BEHAVIORAL HEALTH | Facility: CLINIC | Age: 63
Discharge: HOME | End: 2023-05-03
Attending: PSYCHIATRY & NEUROLOGY
Payer: COMMERCIAL

## 2023-05-03 DIAGNOSIS — F33.1 MODERATE EPISODE OF RECURRENT MAJOR DEPRESSIVE DISORDER (H): Primary | ICD-10-CM

## 2023-05-03 PROCEDURE — 90853 GROUP PSYCHOTHERAPY: CPT | Mod: GT,95 | Performed by: SOCIAL WORKER

## 2023-05-03 PROCEDURE — 90853 GROUP PSYCHOTHERAPY: CPT | Mod: GT,95

## 2023-05-03 NOTE — GROUP NOTE
Psychotherapy Group Note    PATIENT'S NAME: Nancy Montemayor  MRN:   [id number]  :   1960  ACCT. NUMBER: [id number]  DATE OF SERVICE: 23  START TIME: 10:00 AM  END TIME: 10:50 AM  FACILITATOR: Nancy Martins LICSW  TOPIC: MH EBP Group: Behavioral Activation  Service Modality:  Video Visit     Telemedicine Visit: The patient's condition can be safely assessed and treated via synchronous audio and visual telemedicine encounter.       Reason for Telemedicine Visit: Services only offered telehealth and due to COVID-19.     Originating Site (Patient Location): Patient's home     Distant Site (Provider Location): Provider Remote Setting- Home Office     Consent:  The patient/guardian has verbally consented to: the potential risks and benefits of telemedicine (video visit) versus in person care; bill my insurance or make self-payment for services provided; and responsibility for payment of non-covered services.      Patient would like the video invitation sent by:  My Chart     Mode of Communication:  Video Conference via Medical Zoom     As the provider I attest to compliance with applicable laws and regulations related to telemedicine.     [facility] 55+ Program  TRACK: A1    NUMBER OF PARTICIPANTS: 6    Summary of Group / Topics Discussed:  Behavioral Activation: Activity Scheduling:Patients explored how they currently spend their time, and how specific behaviors impact thoughts and feelings.  The group explored the effect of negative and positive activities on mood states and thought patterns.  Patients identified activities that help to improve mood and thinking patterns, and developed a plan to implement positive activities between sessions.      Patient Session Goals / Objectives:    Identify impact of current behaviors on thoughts and mood    Identify 2-3 behavioral changes that could have a positive impact on thoughts and mood    Prepare to make desired behavioral change: Create a change  plan / activity schedule      Patient Participation / Response:  Fully participated with the group by sharing personal reflections / insights and openly received / provided feedback with other participants.    Demonstrated understanding of topics discussed through group discussion and participation and Practiced skills in session    Treatment Plan:  Patient has a current master individualized treatment plan.  See Epic treatment plan for more information.    Nancy Martins, LICSW

## 2023-05-03 NOTE — GROUP NOTE
Psychoeducation Group Note    PATIENT'S NAME: Nancy Montemayor  MRN:   [id number]  :   1960  ACCT. NUMBER: [id number]  DATE OF SERVICE: 23  START TIME: 11:00 AM  END TIME: 11:50 AM  FACILITATOR: Christie Alvares LGSW  TOPIC: MH Wellness Group: Mental Health Maintenance  [facility] 55+ Program  TRACK: A1    NUMBER OF PARTICIPANTS: 6    Summary of Group / Topics Discussed:  Mental Health Maintenance:  Letting Go of Stress: Patients reviewed and discussed stress management journal which demonstrated simple proven techniques too quickly and effectively release stress.     Patient Session Goals / Objectives:  ? Patients discovered quick, easy and effective stress reduction techniques  ? Patients practiced techniques that were demonstrated in the stress management journal  ? Patients identified one technique they will use to cope with symptoms                                      Service Modality:  Video Visit     Telemedicine Visit: The patient's condition can be safely assessed and treated via synchronous audio and visual telemedicine encounter.      Reason for Telemedicine Visit: Services only offered telehealth    Originating Site (Patient Location): Patient's home    Distant Site (Provider Location): Provider Remote Setting- Home Office    Consent:  The patient/guardian has verbally consented to: the potential risks and benefits of telemedicine (video visit) versus in person care; bill my insurance or make self-payment for services provided; and responsibility for payment of non-covered services.     Patient would like the video invitation sent by:  My Chart    Mode of Communication:  Video Conference via Medical Zoom    As the provider I attest to compliance with applicable laws and regulations related to telemedicine.                                 Patient Participation / Response:  Fully participated with the group by sharing personal reflections / insights and openly received / provided feedback  with other participants.    Demonstrated understanding of topics discussed through group discussion and participation, Identified / Expressed personal readiness to practice skills and Verbalized understanding of mental health maintenance topic    Treatment Plan:  Patient has a current master individualized treatment plan.  See Epic treatment plan for more information.    Christie Alvares LGSW

## 2023-05-03 NOTE — GROUP NOTE
Process Group Note    PATIENT'S NAME: Nancy Montemayor  MRN:   [id number]  :   1960  ACCT. NUMBER: [id number]  DATE OF SERVICE: 23  START TIME:  9:00 AM  END TIME:  9:50 AM  FACILITATOR: Nancy Martins LICSW  TOPIC:  Process Group    Diagnoses:  296.32 (F33.1) Major Depressive Disorder, Recurrent Episode, Moderate         Service Modality:  Video Visit     Telemedicine Visit: The patient's condition can be safely assessed and treated via synchronous audio and visual telemedicine encounter.       Reason for Telemedicine Visit: Services only offered telehealth and due to COVID-19.     Originating Site (Patient Location): Patient's home     Distant Site (Provider Location): Provider Remote Setting- Home Office     Consent:  The patient/guardian has verbally consented to: the potential risks and benefits of telemedicine (video visit) versus in person care; bill my insurance or make self-payment for services provided; and responsibility for payment of non-covered services.      Patient would like the video invitation sent by:  My Chart     Mode of Communication:  Video Conference via Medical Zoom     As the provider I attest to compliance with applicable laws and regulations related to telemedicine.     [facility] 55+ Program  TRACK: A1    NUMBER OF PARTICIPANTS: 5          Data:    Session content: At the start of this group, patients were invited to check in by identifying themselves, describing their current emotional status, and identifying issues to address in this group.   Area(s) of treatment focus addressed in this session included Symptom Management, Personal Safety, Community Resources/Discharge Planning, Abstinence/Relapse Prevention, Develop / Improve Independent Living Skills, Develop Socialization / Interpersonal Relationship Skills and Physical Health .    Nancy reports less depressed and less anxious mood. Denies S/I or safety issues. Nancy reports interruption in sleep pattern.  She feels tired today. She has ideas of activities for a low key day. She has been using symptom management skills by self soothing with the tactile sense (adult coloring books).   She consulted with her outpatient therapist yesterday. She is looking forward to discuss medication management with her outpatient psychiatrist on Friday. She will focus on spring cleaning at home.     Therapeutic Interventions/Treatment Strategies:  Psychotherapist offered support, feedback and validation and reinforced use of skills. Treatment modalities used include Cognitive Behavioral Therapy.    Assessment:    Patient response:   Patient responded to session by accepting feedback, giving feedback, listening, focusing on goals, being attentive and verbalizing understanding    Possible barriers to participation / learning include: and no barriers identified    Health Issues:   Yes: Sleep disturbance, Associated Psychological Distress  Chronic disease management, Associated Psychological Distress     Medication compliance.       Substance Use Review:   Substance Use: No active concerns identified.    Mental Status/Behavioral Observations  Appearance:   Appropriate   Eye Contact:   Good   Psychomotor Behavior: Normal   Attitude:   Cooperative  Interested Pleasant  Orientation:   All  Speech   Rate / Production: Normal    Volume:  Normal   Mood:    Less depressed and less anxious mood   Affect:    Appropriate   Thought Content:   Clear and Safety denies any current safety concerns including suicidal ideation, self-harm, and homicidal ideation  Thought Form:  Coherent  Goal Directed  Logical     Insight:    Good     Plan:     Safety Plan: No current safety concerns identified.  Recommended that patient call 911 or go to the local ED should there be a change in any of these risk factors.     Barriers to treatment: None identified    Patient Contracts (see media tab):  None    Substance Use: Not addressed in session     Continue or  Discharge: Patient will continue in 55+ Program (55+) as planned. Patient is likely to benefit from learning and using skills as they work toward the goals identified in their treatment plan.      Nancy Martins, [facility]  May 3, 2023

## 2023-05-05 ENCOUNTER — HOSPITAL ENCOUNTER (OUTPATIENT)
Dept: BEHAVIORAL HEALTH | Facility: CLINIC | Age: 63
Discharge: HOME | End: 2023-05-05
Attending: PSYCHIATRY & NEUROLOGY
Payer: COMMERCIAL

## 2023-05-05 DIAGNOSIS — F33.1 MODERATE EPISODE OF RECURRENT MAJOR DEPRESSIVE DISORDER (H): Primary | ICD-10-CM

## 2023-05-05 PROCEDURE — 90853 GROUP PSYCHOTHERAPY: CPT | Mod: GT,95

## 2023-05-05 NOTE — GROUP NOTE
Process Group Note    PATIENT'S NAME: Nancy Montemayor  MRN:   [id number]  :   1960  ACCT. NUMBER: [id number]  DATE OF SERVICE: 23  START TIME:  9:00 AM  END TIME: 10:00 AM  FACILITATOR: Leeanne Maria LMFT  TOPIC:  Process Group    Diagnoses:  Principal DSM5 Diagnoses  (Sustained by DSM5 Criteria Listed Above):   296.32 (F33.1) Major Depressive Disorder, Recurrent Episode, Moderate        [facility]  TRACK: A1    NUMBER OF PARTICIPANTS: 5                                      Service Modality:  Video Visit     Telemedicine Visit: The patient's condition can be safely assessed and treated via synchronous audio and visual telemedicine encounter.      Reason for Telemedicine Visit: Patient has requested telehealth visit    Originating Site (Patient Location): Patient's home    Distant Site (Provider Location): Provider Remote Setting- Home Office    Consent:  The patient/guardian has verbally consented to: the potential risks and benefits of telemedicine (video visit) versus in person care; bill my insurance or make self-payment for services provided; and responsibility for payment of non-covered services.     Patient would like the video invitation sent by:  My Chart    Mode of Communication:  Video Conference via Medical Zoom    As the provider I attest to compliance with applicable laws and regulations related to telemedicine.                                  Data:    Session content: At the start of this group, patients were invited to check in by identifying themselves, describing their current emotional status, and identifying issues to address in this group.   Area(s) of treatment focus addressed in this session included Symptom Management, Personal Safety, Develop / Improve Independent Living Skills and Develop Socialization / Interpersonal Relationship Skills.    Patient reported feeling good today. Patient reported feeling optimistic today. Patient  stated last week she was over booked and stressed. She keeps having friends calling and went out for lunch and a walk with a friend. Patient reported having another busy day today with friends and a psychiatrist appointment. Patient reported she found out a friend of hers  was diagnosed with Parkinson s which has been on her mind. Zia reported taking medications as prescribed. Patient reported feeling grateful for group and looks forward to attending each day. Patient reported feeling proud she has been using the skills that help her get through the day. Patient stated positive affirmations have been helping a lot.     Therapeutic Interventions/Treatment Strategies:  Psychotherapist offered support, feedback and validation and reinforced use of skills. Treatment modalities used include Motivational Interviewing, Cognitive Behavioral Therapy and Dialectical Behavioral Therapy. Interventions include Emotions Management:  Discussed barriers to emotional regulation and Increased awareness of daily mood patterns/changes and Relationship Skills: Assisted patients in implementing more effective communication skills in their relationships.    Assessment:    Patient response:   Patient responded to session by accepting feedback, giving feedback, listening, focusing on goals, being attentive and accepting support    Possible barriers to participation / learning include: and no barriers identified    Health Issues:   None reported       Substance Use Review:   Substance Use: No active concerns identified.    Mental Status/Behavioral Observations  Appearance:   Appropriate   Eye Contact:   Good   Psychomotor Behavior: Normal   Attitude:   Cooperative   Orientation:   All  Speech   Rate / Production: Normal    Volume:  Normal   Mood:    Normal  Affect:    Appropriate   Thought Content:   Clear  Thought Form:  Coherent  Logical     Insight:    Good     Plan:     Safety Plan: No current safety concerns identified.   Recommended that patient call 911 or go to the local ED should there be a change in any of these risk factors.     Barriers to treatment: None identified    Patient Contracts (see media tab):  None    Substance Use: Not addressed in session     Continue or Discharge: Patient will continue in Adult Day Treatment (ADT)  as planned. Patient is likely to benefit from learning and using skills as they work toward the goals identified in their treatment plan.      Leeanne Maria, REIDFT  May 5, 2023

## 2023-05-05 NOTE — GROUP NOTE
Psychoeducation Group Note    PATIENT'S NAME: Nancy Montemayor  MRN:   [id number]  :   1960  ACCT. NUMBER: [id number]  DATE OF SERVICE: 23  START TIME: 11:00 AM  END TIME: 11:50 AM  FACILITATOR: Christie Alvares LGSW  TOPIC:  Wellness Group: Health Maintenance  [facility] 55+ Program  TRACK: A1    NUMBER OF PARTICIPANTS: 5    Summary of Group / Topics Discussed:  Health Maintenance: Wellness Check-in: Patients met with group facilitator to individually review a holistic wellness check-in to assess patient medication adherence/concerns, appointments, physical and mental health, exercise, nutrition, sleep, socialization, substance use, and need for service/resource referrals.       Patient Session Goals / Objectives:    Discussed various aspects of health management and self-care related to physical and mental health    Demonstrated increased self-awareness of current wellness needs    Developed health literacy skills in navigating the healthcare system and self-advocacy/communicating needs with health care team                                      Service Modality:  Video Visit     Telemedicine Visit: The patient's condition can be safely assessed and treated via synchronous audio and visual telemedicine encounter.      Reason for Telemedicine Visit: Services only offered telehealth    Originating Site (Patient Location): Patient's home    Distant Site (Provider Location): Provider Remote Setting- Home Office    Consent:  The patient/guardian has verbally consented to: the potential risks and benefits of telemedicine (video visit) versus in person care; bill my insurance or make self-payment for services provided; and responsibility for payment of non-covered services.     Patient would like the video invitation sent by:  My Chart    Mode of Communication:  Video Conference via Medical Zoom    As the provider I attest to compliance with applicable laws and regulations related to telemedicine.                                Patient Participation / Response:  Fully participated with the group by sharing personal reflections / insights and openly received / provided feedback with other participants.    Demonstrated understanding of topics discussed through group discussion and participation, Identified / Expressed personal readiness to practice skills and Verbalized understanding of health maintenance topic    Treatment Plan:  Patient has a current master individualized treatment plan.  See Epic treatment plan for more information.    Christie Alvares LGSW

## 2023-05-05 NOTE — GROUP NOTE
Psychotherapy Group Note    PATIENT'S NAME: Nancy Montemayor  MRN:   [id number]  :   1960  ACCT. NUMBER: [id number]  DATE OF SERVICE: 23  START TIME: 10:00 AM  END TIME: 10:50 AM  FACILITATOR: Leeanne Maria LMFT  TOPIC:  EBP Group: Coping Skills  [facility] Adult Mental Health Day Treatment  TRACK: A1    NUMBER OF PARTICIPANTS: 5                                      Service Modality:  Video Visit     Telemedicine Visit: The patient's condition can be safely assessed and treated via synchronous audio and visual telemedicine encounter.      Reason for Telemedicine Visit: Patient has requested telehealth visit    Originating Site (Patient Location): Patient's home    Distant Site (Provider Location): Provider Remote Setting- Home Office    Consent:  The patient/guardian has verbally consented to: the potential risks and benefits of telemedicine (video visit) versus in person care; bill my insurance or make self-payment for services provided; and responsibility for payment of non-covered services.     Patient would like the video invitation sent by:  My Chart    Mode of Communication:  Video Conference via Medical Zoom    As the provider I attest to compliance with applicable laws and regulations related to telemedicine.              Summary of Group / Topics Discussed:  Coping Skills: Additional Coping Skills:  Patients discussed and practiced coping ahead and building mastery.  Reviewed the benefits of applying the aforementioned coping strategies.  Patients explored how these strategies might be applied to daily stressors or distressing situations.    Patient Session Goals / Objectives:    Understand the purpose and benefits of applying emotion regulation coping strategies    Applied the use of cope ahead to current life situations    Address barriers to utilizing coping skills when in distress.        Patient Participation / Response:  Fully participated with the group by sharing personal  reflections / insights and openly received / provided feedback with other participants.    Demonstrated understanding of topics discussed through group discussion and participation, Expressed understanding of the relevance / importance of coping skills at distressing times in life, Demonstrated knowledge of when to consider using a variety of coping skills in daily life, Identified barriers to applying coping skills, Identified 2-3 positive coping strategies that have helped maintain / improve symptoms in the past and Committed to using the following techniques in times of distress: cope ahead    Treatment Plan:  Patient has a current master individualized treatment plan.  See Epic treatment plan for more information.    Leeanne Maria LMFT

## 2023-05-09 ENCOUNTER — HOSPITAL ENCOUNTER (OUTPATIENT)
Dept: BEHAVIORAL HEALTH | Facility: CLINIC | Age: 63
Discharge: HOME | End: 2023-05-09
Attending: PSYCHIATRY & NEUROLOGY
Payer: COMMERCIAL

## 2023-05-09 DIAGNOSIS — F33.1 MODERATE EPISODE OF RECURRENT MAJOR DEPRESSIVE DISORDER (H): Primary | ICD-10-CM

## 2023-05-09 PROCEDURE — 90853 GROUP PSYCHOTHERAPY: CPT | Mod: GT,95

## 2023-05-09 PROCEDURE — 90853 GROUP PSYCHOTHERAPY: CPT | Mod: GT,95 | Performed by: SOCIAL WORKER

## 2023-05-09 NOTE — GROUP NOTE
Process Group Note    PATIENT'S NAME: Nancy Montemayor  MRN:   [id number]  :   1960  ACCT. NUMBER: [id number]  DATE OF SERVICE: 23  START TIME:  9:00 AM  END TIME:  9:50 AM  FACILITATOR: Nancy Martins LICSW  TOPIC:  Process Group    Diagnoses:  296.32 (F33.1) Major Depressive Disorder, Recurrent Episode, Moderate         Service Modality:  Video Visit     Telemedicine Visit: The patient's condition can be safely assessed and treated via synchronous audio and visual telemedicine encounter.       Reason for Telemedicine Visit: Services only offered telehealth and due to COVID-19.     Originating Site (Patient Location): Patient's home     Distant Site (Provider Location): Provider Remote Setting- Home Office     Consent:  The patient/guardian has verbally consented to: the potential risks and benefits of telemedicine (video visit) versus in person care; bill my insurance or make self-payment for services provided; and responsibility for payment of non-covered services.      Patient would like the video invitation sent by:  My Chart     Mode of Communication:  Video Conference via Medical Zoom     As the provider I attest to compliance with applicable laws and regulations related to telemedicine.     [employer] 55+ Program  TRACK: A1    NUMBER OF PARTICIPANTS: 3          Data:    Session content: At the start of this group, patients were invited to check in by identifying themselves, describing their current emotional status, and identifying issues to address in this group.   Area(s) of treatment focus addressed in this session included Symptom Management, Personal Safety, Community Resources/Discharge Planning, Abstinence/Relapse Prevention, Develop / Improve Independent Living Skills, Develop Socialization / Interpersonal Relationship Skills and Physical Health .    Nancy reports less depressed mood. Denies S/I or safety issues. Nancy is focused on sleep hygiene. Her sleep cycle has improved  the past two days slightly. She consulted with her outpatient psychiatrist who recommended her to stop taking Abilify and start taking   Buspar. She will follow up with her outpatient psychiatrist in 2 weeks. Nancy has realistic activities to structure her time and routine. She will have dinner with friends at the [employer]. She will have two meals with her mom this week who is recovering from COVID at the Nursing Home. She is looking forward to babysitting her great nephew on Friday.       Therapeutic Interventions/Treatment Strategies:  Psychotherapist offered support, feedback and validation and reinforced use of skills. Treatment modalities used include Cognitive Behavioral Therapy.    Assessment:    Patient response:   Patient responded to session by accepting feedback, giving feedback, listening, focusing on goals and verbalizing understanding    Possible barriers to participation / learning include: and no barriers identified    Health Issues:   Yes: Chronic disease management, Associated Psychological Distress     Monitoring sleep hygiene.    Reports medication compliance.       Substance Use Review:   Substance Use: No active concerns identified.    Mental Status/Behavioral Observations  Appearance:   Appropriate   Eye Contact:   Good   Psychomotor Behavior: Normal   Attitude:   Cooperative  Interested  Orientation:   All  Speech   Rate / Production: Normal    Volume:  Normal   Mood:    Less depressed mood  Affect:    Appropriate   Thought Content:   Clear and Safety denies any current safety concerns including suicidal ideation, self-harm, and homicidal ideation  Thought Form:  Coherent  Goal Directed  Logical     Insight:    Good     Plan:     Safety Plan: No current safety concerns identified.  Recommended that patient call 911 or go to the local ED should there be a change in any of these risk factors.     Barriers to treatment: None identified    Patient Contracts (see media tab):   None    Substance Use: Not addressed in session     Continue or Discharge: Patient will continue in 55+ Program (55+) as planned. Patient is likely to benefit from learning and using skills as they work toward the goals identified in their treatment plan.      Nancy Martins, [facility]  May 9, 2023

## 2023-05-09 NOTE — GROUP NOTE
Psychotherapy Group Note    PATIENT'S NAME: Nancy Montemayor  MRN:   [id number]  :   1960  ACCT. NUMBER: [id number]  DATE OF SERVICE: 23  START TIME: 10:00 AM  END TIME: 10:50 AM  FACILITATOR: Nancy Martins LICSW  TOPIC: MH EBP Group: Coping Skills  Service Modality:  Video Visit     Telemedicine Visit: The patient's condition can be safely assessed and treated via synchronous audio and visual telemedicine encounter.       Reason for Telemedicine Visit: Services only offered telehealth and due to COVID-19.     Originating Site (Patient Location): Patient's home     Distant Site (Provider Location): Provider Remote Setting- Home Office     Consent:  The patient/guardian has verbally consented to: the potential risks and benefits of telemedicine (video visit) versus in person care; bill my insurance or make self-payment for services provided; and responsibility for payment of non-covered services.      Patient would like the video invitation sent by:  My Chart     Mode of Communication:  Video Conference via Medical Zoom     As the provider I attest to compliance with applicable laws and regulations related to telemedicine.     [employer] 55+ Program  TRACK: A1    NUMBER OF PARTICIPANTS: 3    Summary of Group / Topics Discussed:  Coping Skills: Self-Soothe: Patients learned to apply self-soothe as a way to decrease heightened stress in the moment.  Patients identified situations that necessitate self-soothe strategies.  They focused on ways to manage physical symptoms of distress using the senses. They discussed how to distinguish when this can be useful in their lives when other strategies are more relevant or helpful.    Patient Session Goals / Objectives:    Understand the purpose of using the senses to decrease distress    Process what happens in the body when using self-soothe strategies    Demonstrate understanding of when to use self-soothe strategies    Identify and problem solve  barriers to applying self-soothe strategies.    Choose 1-2 self-soothe strategies to apply during times of distress.        Patient Participation / Response:  Fully participated with the group by sharing personal reflections / insights and openly received / provided feedback with other participants.    Demonstrated understanding of topics discussed through group discussion and participation and Demonstrated knowledge of when to consider using a variety of coping skills in daily life    Treatment Plan:  Patient has a current master individualized treatment plan.  See Epic treatment plan for more information.    Nancy Martins, FRANCESCASW

## 2023-05-09 NOTE — GROUP NOTE
Psychotherapy Group Note    PATIENT'S NAME: Nancy Montemayor  MRN:   [id number]  :   1960  ACCT. NUMBER: [id number]  DATE OF SERVICE: 23  START TIME: 11:00 AM  END TIME: 11:50 AM  FACILITATOR: Christie Alvares LGSW  TOPIC: MH EBP Group: Coping Skills  [facility] 55+ Program  TRACK: A1  NUMBER OF PARTICIPANTS: 3    Summary of Group / Topics Discussed:  Coping Skills: Distraction: Patients learned to mindfully use distraction as a way to decrease heightened stress in the moment.  Patients will identified situations that necessitate healthy distraction strategies.  They explored ways to manage physical symptoms of distress using distraction. The group began to distinguish when this can be useful in their lives or when other strategies would be more relevant or helpful.    Patient Session Goals / Objectives:    Understand the purpose and benefits of using healthy distraction to decrease distress.    Process what happens in the body when using distraction strategies.    Demonstrate understanding of when to use distraction strategies.    Explore patient s current distraction activities, and how to take a more intentional approach to the use of distraction.    Identify and problem solve barriers to applying distraction strategies.    Choose 1-2 healthy distraction strategies to apply during times of distress.                                      Service Modality:  Video Visit     Telemedicine Visit: The patient's condition can be safely assessed and treated via synchronous audio and visual telemedicine encounter.      Reason for Telemedicine Visit: Services only offered telehealth    Originating Site (Patient Location): Patient's home    Distant Site (Provider Location): Provider Remote Setting- Home Office    Consent:  The patient/guardian has verbally consented to: the potential risks and benefits of telemedicine (video visit) versus in person care; bill my insurance or make self-payment for services  provided; and responsibility for payment of non-covered services.     Patient would like the video invitation sent by:  My Chart    Mode of Communication:  Video Conference via Medical Zoom    As the provider I attest to compliance with applicable laws and regulations related to telemedicine.                                 Patient Participation / Response:  Fully participated with the group by sharing personal reflections / insights and openly received / provided feedback with other participants.    Demonstrated understanding of topics discussed through group discussion and participation, Expressed understanding of the relevance / importance of coping skills at distressing times in life, Demonstrated knowledge of when to consider using a variety of coping skills in daily life, Identified barriers to applying coping skills, Identified 2-3 positive coping strategies that have helped maintain / improve symptoms in the past, Practiced 2-3 new coping skills in session and Identified / Expressed personal readiness to practice new coping skills    Treatment Plan:  Patient has a current master individualized treatment plan.  See Epic treatment plan for more information.    Christie Alvares, TRAVONSW

## 2023-05-10 ENCOUNTER — HOSPITAL ENCOUNTER (OUTPATIENT)
Dept: BEHAVIORAL HEALTH | Facility: CLINIC | Age: 63
Discharge: HOME | End: 2023-05-10
Attending: PSYCHIATRY & NEUROLOGY
Payer: COMMERCIAL

## 2023-05-10 DIAGNOSIS — F33.1 MODERATE EPISODE OF RECURRENT MAJOR DEPRESSIVE DISORDER (H): Primary | ICD-10-CM

## 2023-05-10 PROCEDURE — 90853 GROUP PSYCHOTHERAPY: CPT | Mod: GT,95

## 2023-05-10 PROCEDURE — 90853 GROUP PSYCHOTHERAPY: CPT | Mod: GT,95 | Performed by: SOCIAL WORKER

## 2023-05-10 PROCEDURE — 90834 PSYTX W PT 45 MINUTES: CPT | Mod: GT,95 | Performed by: SOCIAL WORKER

## 2023-05-10 NOTE — GROUP NOTE
Process Group Note    PATIENT'S NAME: Nancy Montemayor  MRN:   [id number]  :   1960  ACCT. NUMBER: [id number]  DATE OF SERVICE: 5/10/23  START TIME:  9:00 AM  END TIME:  9:50 AM  FACILITATOR: Nancy Martins LICSW  TOPIC:  Process Group    Diagnoses:  296.32 (F33.1) Major Depressive Disorder, Recurrent Episode, Moderate         Service Modality:  Video Visit     Telemedicine Visit: The patient's condition can be safely assessed and treated via synchronous audio and visual telemedicine encounter.       Reason for Telemedicine Visit: Services only offered telehealth and due to COVID-19.     Originating Site (Patient Location): Patient's home     Distant Site (Provider Location): Provider Remote Setting- Home Office     Consent:  The patient/guardian has verbally consented to: the potential risks and benefits of telemedicine (video visit) versus in person care; bill my insurance or make self-payment for services provided; and responsibility for payment of non-covered services.      Patient would like the video invitation sent by:  My Chart     Mode of Communication:  Video Conference via Medical Zoom     As the provider I attest to compliance with applicable laws and regulations related to telemedicine.     [employer] 55+ Program  TRACK: A1    NUMBER OF PARTICIPANTS: 1   Writer met pt in 1:1 due to being only attendee for first hour.        Data:    Session content: At the start of this group, patients were invited to check in by identifying themselves, describing their current emotional status, and identifying issues to address in this group.   Area(s) of treatment focus addressed in this session included Symptom Management, Personal Safety, Community Resources/Discharge Planning, Abstinence/Relapse Prevention, Develop / Improve Independent Living Skills, Develop Socialization / Interpersonal Relationship Skills and Physical Health .    Nancy reports less depressed and less anxious mood. Denies S/I  or safety issues. Nancy will have lunch with her dad  and mom today. She processed the interpersonal relationship dynamics with her mom who had a stroke at the age of 52 when Nancy was age 32. Her mother is bed bound and has no mobility in her arm. She receives care at a nursing home. Nancy has decided to take the summer off from work. She has decided to focus on self care. She has a girlfriend who has a pool.  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 All expenses will be paid. Nancy has a relapse prevention plan. She continues to focus on sleep hygiene and connect socially with others.       Therapeutic Interventions/Treatment Strategies:  Psychotherapist offered support, feedback and validation and reinforced use of skills. Treatment modalities used include Cognitive Behavioral Therapy.    Assessment:    Patient response:   Patient responded to session by accepting feedback, giving feedback, listening, focusing on goals, being attentive, accepting support and verbalizing understanding    Possible barriers to participation / learning include: and no barriers identified    Health Issues:   Yes: Sleep disturbance, Associated Psychological Distress  Chronic disease management, No Psychological Distress       Medication compliance.    Substance Use Review:   Substance Use: No active concerns identified.    Mental Status/Behavioral Observations  Appearance:   Appropriate   Eye Contact:   Good   Psychomotor Behavior: Normal   Attitude:   Cooperative  Interested Pleasant  Orientation:   All  Speech   Rate / Production: Normal    Volume:  Normal   Mood:    Less depressed and  less anxious mood today.  Affect:    Appropriate   Thought Content:   Clear and Safety denies any current safety concerns including suicidal ideation, self-harm, and homicidal ideation  Thought Form:  Coherent  Goal Directed  Logical     Insight:    Good     Plan:      Safety Plan: No current safety concerns identified.  Recommended that patient call 911 or go to the local ED should there be a change in any of these risk factors.     Barriers to treatment: None identified    Patient Contracts (see media tab):  None    Substance Use: Not addressed in session     Continue or Discharge: Patient will continue in 55+ Program (55+) as planned. Patient is likely to benefit from learning and using skills as they work toward the goals identified in their treatment plan. Nancy will transition to receiving in person care for IOP treatment beginning Friday, May 12.      Nancy Martins, [facility]  May 10, 2023

## 2023-05-10 NOTE — GROUP NOTE
Psychotherapy Group Note    PATIENT'S NAME: Nancy Montemayor  MRN:   [id number]  :   1960  ACCT. NUMBER: [id number]  DATE OF SERVICE: 5/10/23  START TIME: 11:00 AM  END TIME: 11:50 AM  FACILITATOR: Christie Alvares LGSW  TOPIC: MH EBP Group: Specialty Awareness  [facility] 55+ Program  TRACK: A1    NUMBER OF PARTICIPANTS: 2    Summary of Group / Topics Discussed:  Specialty Topics: Life Transitions: The topic of life transitions was presented in order to help patients to better understand the challenges presented by life transitions, and how to best navigate them. Exploring the phases of transition and how one works through them was discussed. Patients were provided with information regarding community resources.     Patient Session Goals / Objectives:    Discussed the timing and nature of major life transitions    Explored how life transitions may impact mental health and functioning    Discussed coping strategies to manage symptoms and help with transitioning    Discussed and planned a successful transition                                      Service Modality:  Video Visit     Telemedicine Visit: The patient's condition can be safely assessed and treated via synchronous audio and visual telemedicine encounter.      Reason for Telemedicine Visit: Services only offered telehealth    Originating Site (Patient Location): Patient's home    Distant Site (Provider Location): Provider Remote Setting- Home Office    Consent:  The patient/guardian has verbally consented to: the potential risks and benefits of telemedicine (video visit) versus in person care; bill my insurance or make self-payment for services provided; and responsibility for payment of non-covered services.     Patient would like the video invitation sent by:  My Chart    Mode of Communication:  Video Conference via Medical Zoom    As the provider I attest to compliance with applicable laws and regulations related to telemedicine.                                Patient Participation / Response:  Fully participated with the group by sharing personal reflections / insights and openly received / provided feedback with other participants.    Demonstrated understanding of topics discussed through group discussion and participation, Identified / Expressed readiness to act on skill suggestions discussed in topic, Verbalized understanding of ways to proactively manage illness and Identified plan to address barriers to practicing skills discussed in topic    Treatment Plan:  Patient has a current master individualized treatment plan.  See Epic treatment plan for more information.    Christie Alvares LGSW

## 2023-05-10 NOTE — GROUP NOTE
Psychoeducation Group Note    PATIENT'S NAME: Nancy Montemayor  MRN:   [id number]  :   1960  ACCT. NUMBER: [id number]  DATE OF SERVICE: 5/10/23  START TIME: 10:00 AM  END TIME: 10:50 AM  FACILITATOR: Nancy Martins LICSW  TOPIC: MH Wellness Group: Health Maintenance  Service Modality:  Video Visit     Telemedicine Visit: The patient's condition can be safely assessed and treated via synchronous audio and visual telemedicine encounter.       Reason for Telemedicine Visit: Services only offered telehealth and due to COVID-19.     Originating Site (Patient Location): Patient's home     Distant Site (Provider Location): Provider Remote Setting- Home Office     Consent:  The patient/guardian has verbally consented to: the potential risks and benefits of telemedicine (video visit) versus in person care; bill my insurance or make self-payment for services provided; and responsibility for payment of non-covered services.      Patient would like the video invitation sent by:  My Chart     Mode of Communication:  Video Conference via Medical Zoom     As the provider I attest to compliance with applicable laws and regulations related to telemedicine.     [facility]  TRACK: A1    NUMBER OF PARTICIPANTS: 2        Summary of Group / Topics Discussed:  Mental Health Maintenance:  Interpersonal Relationships: Trust: In this group, the concept of Trust was explored through the viewing, discussion, and self-reflection of the Lissette Choudhary Talk Titled,  The Anatomy of Trust.       Patient Session Goals / Objectives:    Defined and described definition of trust     Identified the 7 Elements of Trust Using BRAVING that was highlighted in the video.       Patient Participation / Response:  Fully participated with the group by sharing personal reflections / insights and openly received / provided feedback with other participants.    Demonstrated understanding of topics discussed through group discussion and  participation and Verbalized understanding of health maintenance topic    Treatment Plan:  Patient has a current master individualized treatment plan.  See Epic treatment plan for more information.    Nancy Martins, LICSW

## 2023-05-11 ENCOUNTER — TELEPHONE (OUTPATIENT)
Dept: BEHAVIORAL HEALTH | Facility: CLINIC | Age: 63
End: 2023-05-11
Payer: COMMERCIAL

## 2023-05-12 ENCOUNTER — HOSPITAL ENCOUNTER (OUTPATIENT)
Dept: BEHAVIORAL HEALTH | Facility: CLINIC | Age: 63
Discharge: HOME | End: 2023-05-12
Attending: PSYCHIATRY & NEUROLOGY
Payer: COMMERCIAL

## 2023-05-12 DIAGNOSIS — F33.1 MODERATE EPISODE OF RECURRENT MAJOR DEPRESSIVE DISORDER (H): Primary | ICD-10-CM

## 2023-05-12 PROCEDURE — 90853 GROUP PSYCHOTHERAPY: CPT

## 2023-05-12 PROCEDURE — 90853 GROUP PSYCHOTHERAPY: CPT | Performed by: SOCIAL WORKER
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
## 2023-05-12 NOTE — GROUP NOTE
Psychotherapy Group Note    PATIENT'S NAME: Nancy Montemayor  MRN:   [id number]  :   1960  ACCT. NUMBER: [id number]  DATE OF SERVICE: 23  START TIME: 10:00 AM  END TIME: 10:50 AM  FACILITATOR: Domenica Mckeon LGSW  TOPIC: MH EBP Group: Coping Skills  [facility] Mental [facility] Day Treatment  TRACK: 4A    NUMBER OF PARTICIPANTS: 6    Summary of Group / Topics Discussed:  Coping Skills: Additional Coping Skills:  Patients discussed and practiced Socratic Questioning.  Reviewed the benefits of applying the aforementioned coping strategies.  Patients explored how these strategies might be applied to daily stressors or distressing situations.    Patient Session Goals / Objectives:    Understand the purpose and benefits of applying Socratic Questioning    Create new thoughts      Patient Participation / Response:  Fully participated with the group by sharing personal reflections / insights and openly received / provided feedback with other participants.    Demonstrated understanding of topics discussed through group discussion and participation, Expressed understanding of the relevance / importance of coping skills at distressing times in life and Demonstrated knowledge of when to consider using a variety of coping skills in daily life    Treatment Plan:  Patient has a current master individualized treatment plan.  See Epic treatment plan for more information.    JAYDEN Rao

## 2023-05-16 ENCOUNTER — HOSPITAL ENCOUNTER (OUTPATIENT)
Dept: BEHAVIORAL HEALTH | Facility: CLINIC | Age: 63
Discharge: HOME | End: 2023-05-16
Attending: PSYCHIATRY & NEUROLOGY
Payer: COMMERCIAL

## 2023-05-16 DIAGNOSIS — F33.1 MODERATE EPISODE OF RECURRENT MAJOR DEPRESSIVE DISORDER (H): Primary | ICD-10-CM

## 2023-05-16 PROCEDURE — 90853 GROUP PSYCHOTHERAPY: CPT

## 2023-05-16 PROCEDURE — 90853 GROUP PSYCHOTHERAPY: CPT | Performed by: SOCIAL WORKER

## 2023-05-16 NOTE — GROUP NOTE
Psychotherapy Group Note    PATIENT'S NAME: Nancy Montemayor  MRN:   [id number]  :   1960  ACCT. NUMBER: [id number]  DATE OF SERVICE: 23  START TIME: 11:00 AM  END TIME: 11:50 AM  FACILITATOR: Nancy Martins LICSW  TOPIC: MH EBP Group: Relationship Skills  [facility] Mental Health Day Treatment  TRACK: 4A    NUMBER OF PARTICIPANTS: 6    Summary of Group / Topics Discussed:  Relationship Skills: Relationship Mapping: Patients identified different types of relationships they have in their life and examined if there is conflict or closeness, the degree of conflict or closeness, and the reason for conflict. The goal of this topic is to help patients gain awareness of the relationships they have with others, identify types of conflict in patients  lives and how they impact symptoms/functioning, and identify how they can improve relationships with relationship and interpersonal skills they have learned.     Patient Session Goals / Objectives:    Familiarized patients with awareness to the different types of relationships they may have with different people and substances in their lives    Discussed and practiced strategies to promote healthier understanding of interpersonal relationships with a focus on awareness of conflict, the causes of conflict, and the ways to transform conflict    Discussed the use of substances and its impact on their relationships      Patient Participation / Response:  Fully participated with the group by sharing personal reflections / insights and openly received / provided feedback with other participants.    Demonstrated understanding of topics discussed through group discussion and participation and Practiced skills in session    Treatment Plan:  Patient has a current master individualized treatment plan.  See Epic treatment plan for more information.    RAMIREZ Rosas

## 2023-05-16 NOTE — GROUP NOTE
Process Group Note    PATIENT'S NAME: Nancy Montemayor  MRN:   [id number]  :   1960  ACCT. NUMBER: [id number]  DATE OF SERVICE: 23  START TIME:  9:00 AM  END TIME:  9:50 AM  FACILITATOR: Domenica Mckeon LGSW  TOPIC: MH Process Group    Diagnoses:  296.32 (F33.1) Major Depressive Disorder, Recurrent Episode, Moderate       [facility] Day Treatment  TRACK: 4A    NUMBER OF PARTICIPANTS: 6    Service Modality:  Video Visit     Telemedicine Visit: The patient's condition can be safely assessed and treated via synchronous audio and visual telemedicine encounter.      Reason for Telemedicine Visit: Services only offered telehealth    Originating Site (Patient Location): Patient's home    Distant Site (Provider Location): Provider Remote Setting- Home Office    Consent:  The patient/guardian has verbally consented to: the potential risks and benefits of telemedicine (video visit) versus in person care; bill my insurance or make self-payment for services provided; and responsibility for payment of non-covered services.     Patient would like the video invitation sent by:  My Chart    Mode of Communication:  Video Conference via Medical Zoom    As the provider I attest to compliance with applicable laws and regulations related to telemedicine.             Data:    Session content: At the start of this group, patients were invited to check in by identifying themselves, describing their current emotional status, and identifying issues to address in this group.   Area(s) of treatment focus addressed in this session included Symptom Management, Personal Safety and Community Resources/Discharge Planning.  Client reported mood is good.  Client denied safety concerns, including SI and SIB. Client denies any chemical use.  Client is taking medications as prescribed. Client stated she went to the [facility] to see the flowers. Client stated she had some loneliness and listened to music to take  care of herself. Client's goal is create more positive thoughts about herself. She is considering going back to work 12 hours a week. Client will apply the following skills: self-compassion.  Client is proud of her boundaries with her daughter. Peers offered supportive feedback and validation.      Therapeutic Interventions/Treatment Strategies:  Psychotherapist offered support, feedback and validation. Treatment modalities used include Cognitive Behavioral Therapy. Interventions include Coping Skills: Facilitated discussion on learning and applying radical acceptance skill and Relationship Skills: Encouraged development and maintenance  of healthy boundaries.    Assessment:    Patient response:   Patient responded to session by accepting feedback, giving feedback, listening and focusing on goals    Possible barriers to participation / learning include: and no barriers identified    Health Issues:   None reported       Substance Use Review:   Substance Use: No active concerns identified.    Mental Status/Behavioral Observations  Appearance:   Appropriate   Eye Contact:   Good   Psychomotor Behavior: Normal   Attitude:   Cooperative  Interested Friendly Pleasant  Orientation:   All  Speech   Rate / Production: Normal/ Responsive   Volume:  Normal   Mood:    Depressed   Affect:    Appropriate   Thought Content:   Clear and Safety denies any current safety concerns including suicidal ideation, self-harm, and homicidal ideation  Thought Form:  Coherent  Logical     Insight:    Good     Plan:     Safety Plan: No current safety concerns identified.  Recommended that patient call 911 or go to the local ED should there be a change in any of these risk factors.     Barriers to treatment: None identified    Patient Contracts (see media tab):  None    Substance Use: Not addressed in session     Continue or Discharge: Patient will continue in Adult Day Treatment (ADT)  as planned. Patient is likely to benefit from learning and  using skills as they work toward the goals identified in their treatment plan.      Domenica Mckeon, JAYDEN  May 16, 2023

## 2023-05-16 NOTE — GROUP NOTE
Psychotherapy Group Note    PATIENT'S NAME: Nancy Montemayor  MRN:   [id number]  :   1960  ACCT. NUMBER: [id number]  DATE OF SERVICE: 23  START TIME: 10:00 AM  END TIME: 10:50 AM  FACILITATOR: Domenica Mckeon LGSW  TOPIC: MH EBP Group: Behavioral Activation  [facility] Adult Mental Health Day Treatment  TRACK: 4A    NUMBER OF PARTICIPANTS: 6    Summary of Group / Topics Discussed:  Behavioral Activation: Motivation and Procrastination: Patients explored how they currently spend their time, identifying thoughts and feelings that are motivating and serve to increase desired behaviors.  They also examined behaviors that contribute to procrastination.  Different types of procrastination behaviors were identified, and strategies to reduce individual procrastination and increase motivation were explored and practiced.  Patients identified ways to increase goal-directed activities to enhance mood and reduce symptoms.        Patient Session Goals / Objectives:    Identify current patterns of procrastination behavior and how they influence thoughts and moods, and inhibit motivation.    Identify behaviors that can be implemented that contribute to improving thoughts and feelings, motivation, and reduce symptoms.    Identify and develop a plan to increase activities that promote a sense of accomplishment and competence.    Practice scheduling positive activities / behaviors into daily routines.  Service Modality:  Video Visit     Telemedicine Visit: The patient's condition can be safely assessed and treated via synchronous audio and visual telemedicine encounter.      Reason for Telemedicine Visit: Services only offered telehealth    Originating Site (Patient Location): Patient's home    Distant Site (Provider Location): Provider Remote Setting- Home Office    Consent:  The patient/guardian has verbally consented to: the potential risks and benefits of telemedicine (video visit) versus in person care; bill  my insurance or make self-payment for services provided; and responsibility for payment of non-covered services.     Patient would like the video invitation sent by:  My Chart    Mode of Communication:  Video Conference via Medical Zoom    As the provider I attest to compliance with applicable laws and regulations related to telemedicine.         Patient Participation / Response:  Fully participated with the group by sharing personal reflections / insights and openly received / provided feedback with other participants.    Demonstrated understanding of topics discussed through group discussion and participation, Expressed understanding of the relationship between behaviors, thoughts, and feelings and Shared experiences and challenges with making behavioral changes    Treatment Plan:  Patient has a current master individualized treatment plan.  See Epic treatment plan for more information.    Domenica Mckeon, TRAVONSW

## 2023-05-19 ENCOUNTER — HOSPITAL ENCOUNTER (OUTPATIENT)
Dept: BEHAVIORAL HEALTH | Facility: CLINIC | Age: 63
Discharge: HOME | End: 2023-05-19
Attending: PSYCHIATRY & NEUROLOGY
Payer: COMMERCIAL

## 2023-05-19 VITALS — HEART RATE: 91 BPM | OXYGEN SATURATION: 99 % | SYSTOLIC BLOOD PRESSURE: 172 MMHG | DIASTOLIC BLOOD PRESSURE: 94 MMHG

## 2023-05-19 DIAGNOSIS — F33.1 MODERATE EPISODE OF RECURRENT MAJOR DEPRESSIVE DISORDER (H): Primary | ICD-10-CM

## 2023-05-19 PROCEDURE — 99214 OFFICE O/P EST MOD 30 MIN: CPT

## 2023-05-19 PROCEDURE — 90853 GROUP PSYCHOTHERAPY: CPT | Performed by: SOCIAL WORKER

## 2023-05-19 NOTE — PROGRESS NOTES
[facility] Adult Mental Health Day Treatment  TRACK: 4A    PATIENT'S NAME: Nancy Montemayor  MRN:   [id number]  :   1960  ACCT. NUMBER: [id number]  DATE OF SERVICE: 23  START TIME: 9:00  END TIME: 9:55  Service modality:  In-person    PROVIDER OVERSIGHT: Manjit Walsh MD  NUMBER OF PARTICIPANTS: 7    Diagnoses:  296.32 (F33.1) Major Depressive Disorder, Recurrent Episode, Moderate         Data:    Session content: At the start of this group, patients were invited to check in by identifying themselves, describing their current emotional status, and identifying issues to address in this group.   Area(s) of treatment focus addressed in this session included Symptom Management, Personal Safety, Community Resources/Discharge Planning, Abstinence/Relapse Prevention, Develop / Improve Independent Living Skills and Develop Socialization / Interpersonal Relationship Skills.    Nancy reports less depressed and anxious mood with interrupted sleep pattern.  Denies S/I or safety issues. She processed medication changes for mood stabilization. She reports lethargy with starting Trazadone. She continues to monitor effects. She was able to complete domestic tasks at home: dishes, pay bills, self care by showering and food shopping.       Therapeutic Interventions/Treatment Strategies:  Psychotherapist offered support, feedback and validation and reinforced use of skills. Treatment modalities used include Cognitive Behavioral Therapy.    Assessment:    Patient response:   Patient responded to session by accepting feedback, giving feedback, listening, focusing on goals, being attentive, accepting support, appearing disengaged and verbalizing understanding    Possible barriers to participation / learning include: and no barriers identified    Health Issues:   None reported. Medication compliance.       Substance Use Review:   Substance Use: No active concerns identified.    Mental Status/Behavioral  Observations  Appearance:   Appropriate   Eye Contact:   Good   Psychomotor Behavior: Normal   Attitude:   Cooperative  Interested Pleasant  Orientation:   All  Speech   Rate / Production: Normal    Volume:  Normal   Mood:    Less depressed and anxious mood  Affect:    Appropriate   Thought Content:   Clear and Safety denies any current safety concerns including suicidal ideation, self-harm, and homicidal ideation  Thought Form:  Coherent  Goal Directed  Logical     Insight:    Good     Plan:     Safety Plan: No current safety concerns identified.  Recommended that patient call 911 or go to the local ED should there be a change in any of these risk factors.     Barriers to treatment: None identified    Patient Contracts (see media tab):  None    Substance Use: Not addressed in session     Continue or Discharge: Patient will continue in Adult Day Treatment (ADT)  as planned. Patient is likely to benefit from learning and using skills as they work toward the goals identified in their treatment plan.      Nancy Martins, [facility]  May 19, 2023

## 2023-05-19 NOTE — ADDENDUM NOTE
Encounter addended by: Nancy Martins [facility] on: 5/19/2023 2:01 PM   Actions taken: Clinical Note Signed

## 2023-05-19 NOTE — PROGRESS NOTES
[facility] Adult Mental Health Day Treatment  TRACK: 4A    PATIENT'S NAME: Nancy Montemayor  MRN:   [id number]  :   1960  ACCT. NUMBER: [id number]  DATE OF SERVICE: 23  START TIME: 11:00  END TIME: 11:50  Service Modality: In-person    PROVIDER OVERSIGHT: Manjit Walsh MD  NUMBER OF PARTICIPANTS: 6    Summary of Group / Topics Discussed:  Coping Skills: Additional Coping Skills:  Patients discussed and practiced how writing can be used as a insight oriented  tool to build self awareness into the gains made in mental health recovery .  Reviewed the benefits of applying the aforementioned coping strategies.  Patients explored how these strategies might be applied for relapse prevention.     Patient Session Goals / Objectives:    Understand the purpose and benefits of applying writing as a coping strategies to gain insight into mental health recovery and healing.    Using a here and now approach, patients wrote a letter describing the gains made in their mental health.       Patient Participation / Response:  Fully participated with the group by sharing personal reflections / insights and openly received / provided feedback with other participants.    Demonstrated understanding of topics discussed through group discussion and participation    Treatment Plan:  Patient has a current master individualized treatment plan.  See Epic treatment plan for more information.

## 2023-05-19 NOTE — ADDENDUM NOTE
Encounter addended by: Nancy Martins, LICSW on: 5/19/2023 2:02 PM   Actions taken: Charge Capture section accepted

## 2023-05-19 NOTE — PROGRESS NOTES
"[facility]   Adult Mental Health Outpatient Programs  Provider Interval History Note    Program: Intensive Outpatient Program (track 4A)    PATIENT'S NAME: Nancy Montemayor  MRN:   [id number]  :   1960  ACCT. NUMBER: [id number]  DATE OF SERVICE: 23    Interval History:  \"Good.\" Nancy presents today for follow-up and ongoing program supervision.   Endorses:    I overall doing doing    My medication are screwed up    Started Trazone on Tuesday. It took an hour to fall asleep. \"I was knocked out for the entire next day\"  o The next day I had insomnia on Wed \"I did not sleep at all\" In the last few days I have spelt about 4 hours    Medication   o Met with provider  o Started  - Buspar twice a day.  - Trazodone 25 mg at bed time    Symptoms/Systems:    Sleep: See above    Appetite: ok    Daily function/ADLs: No concerns    Hygiene: No concerns    Socialization: A little    Concerns about work or ability to work: N/A    Substance use:    Same a before    Reactions/thoughts about program:    helpful    Safety Assessment:    Suicidal ideation: denies current or recent suicidal ideation or behavior    Thoughts of non-suicidal self-injury: denied    Recent self-injurious behavior: denied    Homicidal ideation: denied    Other safety concerns: denied    Medications:  Current Outpatient Medications   Medication Sig Dispense Refill     ALPRAZolam (XANAX) 0.25 MG tablet Take 0.25 mg by mouth as needed       amLODIPine (NORVASC) 5 MG tablet Take 1 tablet by mouth daily       atorvastatin (LIPITOR) 20 MG tablet Take 20 mg by mouth       DULoxetine (CYMBALTA) 30 MG capsule Take 30 mg by mouth daily       PREMPRO 0.3-1.5 MG tablet Take 1 tablet by mouth daily at 2 pm       Vitamin D, Cholecalciferol, 25 MCG (1000 UT) CAPS        ARIPiprazole (ABILIFY) 2 MG tablet Take 2 mg by mouth daily (Patient not taking: Reported on 2023)       buPROPion (WELLBUTRIN XL) 150 MG 24 hr tablet " Take 1 tablet (150 mg) by mouth every morning (Patient not taking: Reported on 4/19/2023) 30 tablet 0     buPROPion (WELLBUTRIN XL) 300 MG 24 hr tablet Take 1 tablet (300 mg) by mouth every morning (Patient not taking: Reported on 4/19/2023)         The above list was reviewed with patient today.     Patient is taking medications as prescribed and denies adverse effects    Laboratory Results:  Most recent labs reviewed. Pertinent updates/findings: None.     Metrics:  PHQ-9 scores:       3/16/2023    10:00 AM 3/16/2023     1:11 PM   PHQ-9 SCORE   PHQ-9 Total Score MyChart  15 (Moderately severe depression)   PHQ-9 Total Score 23 15       RAMAN-7 scores:       3/16/2023    10:00 AM 3/16/2023     1:14 PM   RAMAN-7 SCORE   Total Score  7 (mild anxiety)   Total Score 9 7       CSSR-S: [place] Suicide Severity Rating Scale (Short Version)      3/14/2023     9:30 PM   [place] Suicide Severity Rating (Short Version)   Over the past 2 weeks have you felt down, depressed, or hopeless? yes   Over the past 2 weeks have you had thoughts of killing yourself? yes   Have you ever attempted to kill yourself? no   Q1 Wished to be Dead (Past Month) yes   Q2 Suicidal Thoughts (Past Month) yes   Q3 Suicidal Thought Method yes   Q4 Suicidal Intent without Specific Plan no   Q5 Suicide Intent with Specific Plan yes   Q6 Suicide Behavior (Lifetime) no   Level of Risk per Screen high risk   High Risk Required Interventions On continuous in person observation   Required Interventions Belongings removed;Patient searched;Room made safe   Interventions DEC consulted         Mental Status Examination:  Vital Signs: BP (!) 172/94   Pulse 91   SpO2 99%    Appearance: adequately groomed, appears stated age, and in no apparent distress.  Attitude: cooperative   Eye Contact: good   Muscle Strength and Tone: no gross abnormalities   Psychomotor Behavior: normal or unremarkable   Gait and Station: deferred  Speech: normal rate, production, volume, and  "rhythm of  Associations: No loosening of associations  Thought Process: coherent and goal directed  Thought Content: no evidence of suicidal ideation or homicidal ideation, no evidence of psychotic thought, no auditory hallucinations present and no visual hallucinations present  Mood: \"depressed\"  Affect: mood congruent  Insight: good  Judgment: intact, adequate for safety  Impulse Control: intact  Oriented to: time, place, person and situation  Attention Span and Concentration: normal  Language: Intact  Recent and Remote Memory: intact to interview. Not formally assessed. No amnesia.  Fund of Knowledge/Assessment of Intelligence: Average  Capacity of Activities of Daily Living: Independent, able to participate in programmatic care services.    Diagnosis/es:    ICD-10-CM    1. Moderate episode of recurrent major depressive disorder (H)  F33.1         Assessment/Plan:  Nancy presents today for follow up. Patient reported sleep disturbance following trial of Trazodone. She oversleep on the first day of trial followed by 3 days of insomnia or late sleep. Par patient reports, sleep issues started following introduction of Buspar. Patient was encouraged to continue with current medication. However, to take Buspar in morning and noon instead of at bedtime. Continue trial of trazodone at bedtime for sleep. . Will follow up early next week or as soon as possible.      296.32 (F33.1) Major Depressive Disorder, Recurrent Episode, Moderate  ? Overall improved   ? Engage in psychotherapy  ? Continue working with transition clinic for medication management  ? Continue with current medication regimen  ? Take Buspirone in the morning and afternoon  ? Take Trazodone 25 mg at bedtime   ? Working on improving sleep hygiene  ? Maintain a balanced diet  o Engage in physical activities as tolerated    Continue all other medications as reviewed per electronic medical record today    Continue therapy as planned:    Enrolled in Intensive " Outpatient Program (track 6A)    Continues to benefit from services    Continues to meet criteria for this level of care    Patient would be at reasonable risk of requiring a higher level of care in the absence of current services.    I feel this patient does not meet criteria for an involuntary hold and is appropriate for treatment at an outpatient level of care.    Continue with individual therapist as appropriate    Safety plan reviewed:    To the Emergency Department as needed or call after hours crisis line at [phone] or [phone]. [place] Crisis Text Line: Text [place] to 423828 or Suicide LifeLine Chat: suicideVirtual Call Center.org/chat    Follow-up:     schedule an appointment with me or another program provider in approximately in 1 week(s) or sooner if needed.  Can speak with a staff member or call the appropriate program number (see below) to schedule    Follow up with outpatient provider(s) as planned or sooner if needed for acute medical concerns.    Questions or concerns:    Call program line with questions or concerns (see below)    DramaFeverhart may be used to communicate with your provider, but this is not intended to be used for emergencies.    [facility] Adult Mental Health Program lines:  [facility]: [phone]  Dual Disorder: [phone]  Adult Day Treatment:  [phone]  55+/Intensive Outpatient: [phone]    Community Resources:    National Suicide Prevention Lifeline: 988 from any phone, or 834-002-1653 (TTY: 154.352.4700). Call anytime for help.  (www.suicidepreventionlifeline.org)  National [place] on Mental Illness (www.cyril.org): 345.113.3809 or 050-447-1288.   Mental Health Association (www.mentalhealth.org): 778.960.2515 or 059-667-4884.  [place] Crisis Text Line: Text [place] to 202703  Suicide LifeLine Chat: suicideVirtual Call Center.org/chat    Treatment Objective(s) Addressed in This Session:  The purpose of today's virtual visit is for this writer to provide  oversight of patient's care while receiving program services. Specific treatment goals addressed included personal safety, symptoms stabilization and management, wellness and mental health, and community resources/discharge planning.     This author or another program provider will follow up with the patient as noted above.     Patient agrees with the current plan of care.    JAZZY DONG CNP  5/19/23      Visit Details:  Type of service: In-person    Location (patient and provider): [facility] Adult Mental Health Outpatient Programmatic Care Offices        Medical Decision Making  The patient's presentation was of high complexity (a chronic illness severe exacerbation, progression, or side effect of treatment).    The patient's evaluation involved:  history and exam without other MDM data elements    The patient's management necessitated only low risk treatment.       30 minutes spent on the date of the encounter doing chart review, patient visit, documentation and discussion with other provider(s)    This document completed in part using Dragon Medical One dictation software.  Please excuse any inadvertent word or phrase substitutions.

## 2023-05-19 NOTE — ADDENDUM NOTE
Encounter addended by: Nancy Martins [facility] on: 5/19/2023 1:50 PM   Actions taken: Episode edited, Visit diagnoses modified

## 2023-05-19 NOTE — GROUP NOTE
Psychoeducation Group Note    PATIENT'S NAME: Nancy Montemayor  MRN:   [id number]  :   1960  ACCT. NUMBER: [id number]  DATE OF SERVICE: 23  START TIME: 10:00 AM  END TIME: 10:50 AM  FACILITATOR: Nancy Martins LICSW  TOPIC: MH Wellness Group: Health Maintenance  [facility] Adult Mental Health Day Treatment  TRACK: 4A    NUMBER OF PARTICIPANTS: 6    Summary of Group / Topics Discussed:  Health Maintenance: Weekend planning: Patients were given time to complete a weekend plan of what they will do to promote wellness and sobriety over the weekend when they do not have the structure of group. Patients were encouraged to review progress on their treatment goals and were challenged to identify ways to work toward meeting them. Patients identified and discussed foreseeable barriers to success over the weekend and then developed a plan to overcome them. Patients reviewed their distress coping skills and social support network and discussed this with the group.       Patient Session Goals / Objectives:    ?    Identified activities to engage in that promote balance in wellness  ?    Distinguished possible barriers to success over the weekend and created a plan to overcome them  ?    Listed distress coping skills and identified social support network to utilize if in crisis during the weekend        Patient Participation / Response:  Fully participated with the group by sharing personal reflections / insights and openly received / provided feedback with other participants.    Demonstrated understanding of topics discussed through group discussion and participation and Verbalized understanding of health maintenance topic    Treatment Plan:  Patient has a current master individualized treatment plan.  See Epic treatment plan for more information.    RAMIREZ Rosas

## 2023-05-23 ENCOUNTER — HOSPITAL ENCOUNTER (OUTPATIENT)
Dept: BEHAVIORAL HEALTH | Facility: CLINIC | Age: 63
Discharge: HOME | End: 2023-05-23
Attending: PSYCHIATRY & NEUROLOGY
Payer: COMMERCIAL

## 2023-05-23 VITALS
HEART RATE: 103 BPM | RESPIRATION RATE: 16 BRPM | DIASTOLIC BLOOD PRESSURE: 97 MMHG | TEMPERATURE: 96.9 F | SYSTOLIC BLOOD PRESSURE: 161 MMHG

## 2023-05-23 DIAGNOSIS — F33.1 MODERATE EPISODE OF RECURRENT MAJOR DEPRESSIVE DISORDER (H): Primary | ICD-10-CM

## 2023-05-23 PROCEDURE — 90853 GROUP PSYCHOTHERAPY: CPT

## 2023-05-23 PROCEDURE — 90853 GROUP PSYCHOTHERAPY: CPT | Performed by: SOCIAL WORKER

## 2023-05-23 PROCEDURE — 99214 OFFICE O/P EST MOD 30 MIN: CPT

## 2023-05-23 RX ORDER — TRAZODONE HYDROCHLORIDE 50 MG/1
25-50 TABLET, FILM COATED ORAL AT BEDTIME
Status: ON HOLD | COMMUNITY
End: 2023-08-18

## 2023-05-23 RX ORDER — BUSPIRONE HYDROCHLORIDE 5 MG/1
10 TABLET ORAL 2 TIMES DAILY
COMMUNITY
End: 2023-08-15

## 2023-05-23 NOTE — GROUP NOTE
Psychotherapy Group Note    PATIENT'S NAME: Nancy Montemayor  MRN:   [id number]  :   1960  ACCT. NUMBER: [id number]  DATE OF SERVICE: 23  START TIME: 10:00 AM  END TIME: 10:50 AM  FACILITATOR: Domenica Mckeon LGSW  TOPIC:  EBP Group: Emotions Management  [facility] Mental Health Day Treatment  TRACK: 4A    NUMBER OF PARTICIPANTS: 7    Summary of Group / Topics Discussed:  Emotions Management: Model of Emotions: Patients were introduced to the cyclical model of emotions.  Explored emotions are shaped by different events and one s interpretation of events.  Group discussed how emotions begin with an event, followed by one s interpretation, followed by associated feelings.  Discussion included a review of personal urges and actions that can/do follow an emotional experience in the patient s life, and the end results.    Patient Session Goals / Objectives:    Demonstrate understanding of types various emotions.    Identify and discuss specific emotions and when they occur; understand triggers.    Identify individual emotions and physical sensations that accompany them.    Discuss urges and actions, and how to influence the intensity of emotional reactions and disrupt the cycle.      Discuss barriers to emotional regulation.    Choose 1-2 strategies to assist with emotional response to potentially distressing situations.      Patient Participation / Response:  Fully participated with the group by sharing personal reflections / insights and openly received / provided feedback with other participants.    Demonstrated understanding of topics discussed through group discussion and participation, Expressed understanding of the relevance / importance of emotions management skills at distressing times in life and Self-aware of experiences with difficult emotions, and strategies to employ to manage them    Treatment Plan:  Patient has a current master individualized treatment plan.  See Epic treatment  plan for more information.    JAYDEN Rao

## 2023-05-23 NOTE — PROGRESS NOTES
"[facility]   Adult Mental Health Outpatient Programs  Provider Interval History Note    Program: Intensive Outpatient Program (track 4A)    PATIENT'S NAME: Nancy Montemayor  MRN:   [id number]  :   1960  ACCT. NUMBER: [id number]  DATE OF SERVICE: 23    Interval History:  \"Good .\" Nancy presents today for follow-up and ongoing program supervision.   Endorses:    Continued concerns with sleep since started Buspar    I followed up with plan we created last week    I haven't been sleeping, except yesterday    I have been taking Buspar in the am and noon and trazodone at bed time       On Friday   o Buspirone in the morning and at 2 pm.   o Trazodone at 11 pm.   - Still fell asleep at 2 am.    Saturday   o I woke up around noon.  o Took Bupar at nonn and 4 pm.   o Trazone half pill at midnight. Fell asleep arounf 2 or 3 pm       o I woke up noon.   o Took Buspar 1 pm and 5 pm.   o Trazodone half pill at 11:30 pm.   o Fell aslppe at 5 am. Slept for 6 hours.    Last night (on Monday)   o I woke up at noon  o Took Buspar at noon but I did not take the 2nd Buspar.  o I took half Trazodone at 11 pm and asleep within 1 hour.     Symptoms/Systems:    Sleep: See above    Appetite: No changes    Daily function/ADLs: It is ok    Hygiene: No cocnerns    Socialization: A little bit    Concerns about work or ability to work: N/A    Substance use:    THC gummies    Reactions/thoughts about program:    Helpful    Safety Assessment:    Suicidal ideation: denies current or recent suicidal ideation or behavior    Thoughts of non-suicidal self-injury: denied    Recent self-injurious behavior: denied    Homicidal ideation: denied    Other safety concerns: denied    Medications:  Current Outpatient Medications   Medication Sig Dispense Refill     ALPRAZolam (XANAX) 0.25 MG tablet Take 0.25 mg by mouth as needed       amLODIPine (NORVASC) 5 MG tablet Take 1 tablet by mouth daily       " busPIRone (BUSPAR) 5 MG tablet Take 10 mg by mouth 2 times daily       DULoxetine (CYMBALTA) 30 MG capsule Take 30 mg by mouth daily       PREMPRO 0.3-1.5 MG tablet Take 1 tablet by mouth daily at 2 pm       traZODone (DESYREL) 50 MG tablet Take 25-50 mg by mouth At Bedtime       Vitamin D, Cholecalciferol, 25 MCG (1000 UT) CAPS        ARIPiprazole (ABILIFY) 2 MG tablet Take 2 mg by mouth daily (Patient not taking: Reported on 5/19/2023)       atorvastatin (LIPITOR) 20 MG tablet Take 20 mg by mouth       buPROPion (WELLBUTRIN XL) 150 MG 24 hr tablet Take 1 tablet (150 mg) by mouth every morning (Patient not taking: Reported on 4/19/2023) 30 tablet 0     buPROPion (WELLBUTRIN XL) 300 MG 24 hr tablet Take 1 tablet (300 mg) by mouth every morning (Patient not taking: Reported on 4/19/2023)         The above list was reviewed and updated in [facility] or reviewed with patient today.     Patient is taking medications as prescribed and denies adverse effects    Laboratory Results:  Most recent labs reviewed. Pertinent updates/findings: None.     Metrics:  PHQ-9 scores:       3/16/2023    10:00 AM 3/16/2023     1:11 PM   PHQ-9 SCORE   PHQ-9 Total Score MyChart  15 (Moderately severe depression)   PHQ-9 Total Score 23 15       RAMAN-7 scores:       3/16/2023    10:00 AM 3/16/2023     1:14 PM   RAMAN-7 SCORE   Total Score  7 (mild anxiety)   Total Score 9 7       CSSR-S: [place] Suicide Severity Rating Scale (Short Version)      3/14/2023     9:30 PM   [place] Suicide Severity Rating (Short Version)   Over the past 2 weeks have you felt down, depressed, or hopeless? yes   Over the past 2 weeks have you had thoughts of killing yourself? yes   Have you ever attempted to kill yourself? no   Q1 Wished to be Dead (Past Month) yes   Q2 Suicidal Thoughts (Past Month) yes   Q3 Suicidal Thought Method yes   Q4 Suicidal Intent without Specific Plan no   Q5 Suicide Intent with Specific Plan yes   Q6 Suicide Behavior (Lifetime) no   Level of  "Risk per Screen high risk   High Risk Required Interventions On continuous in person observation   Required Interventions Belongings removed;Patient searched;Room made safe   Interventions DEC consulted         Mental Status Examination:  Vital Signs: BP (!) 161/97   Pulse 103   Temp 96.9  F (36.1  C) (Tympanic)   Resp 16    Appearance: adequately groomed, appears stated age, and in no apparent distress.  Attitude: cooperative   Eye Contact: good   Muscle Strength and Tone: no gross abnormalities   Psychomotor Behavior: normal or unremarkable   Gait and Station: deferred  Speech: normal rate, production, volume, and rhythm of  Associations: No loosening of associations  Thought Process: coherent and goal directed  Thought Content: no evidence of suicidal ideation or homicidal ideation, no evidence of psychotic thought, no auditory hallucinations present and no visual hallucinations present  Mood: \"anxious\"  Affect: mood congruent  Insight: good  Judgment: intact, adequate for safety  Impulse Control: intact  Oriented to: time, place, person and situation  Attention Span and Concentration: normal  Language: Intact  Recent and Remote Memory: intact to interview. Not formally assessed. No amnesia.  Fund of Knowledge/Assessment of Intelligence: Average  Capacity of Activities of Daily Living: Independent, able to participate in programmatic care services.    Diagnosis/es:    ICD-10-CM    1. Moderate episode of recurrent major depressive disorder (H)  F33.1         Assessment/Plan:  Nancy presents today for follow up. Endorses overall improvement in depressive symptoms, but continues to experience insomnia since Buspar was introduced despite recent trial of Trazodone. See detail in the interval history section above. Patient tried taking the second dose of Buspar not later than 2 pm but still with minima; improvement in sleep. But it is also important to note that patient is taking Trazodone late in the night around " 11 pm, which I think is causing extended sleepiness during day time. Going forwards, patient will take Buspar 10 mg ONCE a day in the morning and Trazodone at bedtime, no later than 9 pm. We will observe sleep pattern for 3 days. Follow up on Friday or sooner as needed. Patient in agreement to continue with psychotherapy to sustain and continue building on current gains. IOP remains the most appropriate level of care. Patient verbalized and agreed with treatment plan      296.32 (F33.1) Major Depressive Disorder, Recurrent Episode, Moderate  ? Overall improved   ? Engage in psychotherapy  ? Continue working with transition clinic for medication management  ? Continue with current medication regimen  - Change Buspirone 10 mg ONCE in the morning  - Take Trazodone 25 mg at bedtime NO LATER THAN 9 pm  ? Continue working on improving sleep hygiene  ? Maintain a balanced diet  ? Engage in physical activities as tolerated     Continue all other medications as reviewed per electronic medical record today    Continue therapy as planned:    Enrolled in 55+ Intensive Outpatient    Continues to benefit from services    Continues to meet criteria for this level of care    Patient would be at reasonable risk of requiring a higher level of care in the absence of current services.    I feel this patient does not meet criteria for an involuntary hold and is appropriate for treatment at an outpatient level of care.    Continue with individual therapist as appropriate    Safety plan reviewed:    To the Emergency Department as needed or call after hours crisis line at [phone] or [phone]. [place] Crisis Text Line: Text [place] to 445784 or Suicide LifeLine Chat: suicidepreventionlifeline.org/chat    Follow-up:     schedule an appointment with me or another program provider in approximately in 4 week(s) or sooner if needed.  Can speak with a staff member or call the appropriate program number (see below) to schedule    Follow up  with outpatient provider(s) as planned or sooner if needed for acute medical concerns.    Questions or concerns:    Call program line with questions or concerns (see below)    Vital Connecthart may be used to communicate with your provider, but this is not intended to be used for emergencies.    [facility] Adult Mental Health Program lines:  [facility]: [phone]  Dual Disorder: [phone]  Adult Day Treatment:  [phone]  55+/Intensive Outpatient: [phone]    Community Resources:    National Suicide Prevention Lifeline: 988 from any phone, or 020-477-9158 (TTY: 490.545.3858). Call anytime for help.  (www.suicidepreventionlifeline.org)  National [place] on Mental Illness (www.cyril.org): 444.879.9060 or 064-093-2801.   Mental Health Association (www.mentalhealth.org): 610.919.7668 or 829-100-4147.  [place] Crisis Text Line: Text [place] to 192388  Suicide LifeLine Chat: suicideThousandEyes.org/chat    Treatment Objective(s) Addressed in This Session:  The purpose of today's virtual visit is for this writer to provide oversight of patient's care while receiving program services. Specific treatment goals addressed included personal safety, symptoms stabilization and management, wellness and mental health, and community resources/discharge planning.     This author or another program provider will follow up with the patient as noted above.     Patient agrees with the current plan of care.    JAZZY DONG CNP  5/23/23      Visit Details:  Type of service: In-person    Location (patient and provider): [facility] Adult Mental Health Outpatient Programmatic Care Offices      Medical Decision Making  The patient's presentation was of moderate complexity (2 or more stable chronic illnesses).    The patient's evaluation involved:  discussion of management or test interpretation with another health professional (outpatient provider)    The patient's management necessitated moderate risk  (prescription drug management including medications given in the ED).       30 minutes spent on the date of the encounter doing chart review, patient visit, documentation and discussion with other provider(s)    This document completed in part using Dragon Medical One dictation software.  Please excuse any inadvertent word or phrase substitutions.

## 2023-05-23 NOTE — GROUP NOTE
Process Group Note    PATIENT'S NAME: Nancy Montemayor  MRN:   [id number]  :   1960  ACCT. NUMBER: [id number]  DATE OF SERVICE: 23  START TIME:  9:00 AM  END TIME:  9:50 AM  FACILITATOR: Domenica Mckeon LGSW  TOPIC:  Process Group    Diagnoses:  296.32 (F33.1) Major Depressive Disorder, Recurrent Episode, Moderate       [facility] Day Treatment  TRACK: 4A    NUMBER OF PARTICIPANTS: 7          Data:    Session content: At the start of this group, patients were invited to check in by identifying themselves, describing their current emotional status, and identifying issues to address in this group.   Area(s) of treatment focus addressed in this session included Symptom Management, Personal Safety and Community Resources/Discharge Planning.  Client reported mood is rested.  Client denied safety concerns, including SI and SIB. Client denies any chemical use.  Client is taking medications as prescribed. Client stated she didn't sleep one night and has questions about her medications. She is scheduled to see DARIA Moralez. Client's goal is to go outside and walk despite knee pain. Client will apply the following skills: opposite to emotion.  Client is proud of friendships.  Client is grateful for friend's recognition of her kindness and thoughtfulness. Peers offered supportive feedback and validation.      Therapeutic Interventions/Treatment Strategies:  Psychotherapist offered support, feedback and validation. Treatment modalities used include Cognitive Behavioral Therapy. Interventions include Emotions Management:  Reviewed opposite action skill.    Assessment:    Patient response:   Patient responded to session by accepting feedback, giving feedback, listening and focusing on goals    Possible barriers to participation / learning include: and no barriers identified    Health Issues:   None reported       Substance Use Review:   Substance Use: No active concerns identified.    Mental  Status/Behavioral Observations  Appearance:   Appropriate   Eye Contact:   Good   Psychomotor Behavior: Normal   Attitude:   Cooperative  Interested Friendly Pleasant  Orientation:   All  Speech   Rate / Production: Normal/ Responsive   Volume:  Normal   Mood:    Depressed   Affect:    Appropriate   Thought Content:   Clear and Safety denies any current safety concerns including suicidal ideation, self-harm, and homicidal ideation  Thought Form:  Coherent  Logical     Insight:    Good     Plan:     Safety Plan: No current safety concerns identified.  Recommended that patient call 911 or go to the local ED should there be a change in any of these risk factors.     Barriers to treatment: None identified    Patient Contracts (see media tab):  None    Substance Use: Not addressed in session     Continue or Discharge: Patient will continue in Adult Day Treatment (ADT)  as planned. Patient is likely to benefit from learning and using skills as they work toward the goals identified in their treatment plan.      Domenica Mckeon, JAYDEN  May 23, 2023

## 2023-05-23 NOTE — GROUP NOTE
Psychotherapy Group Note    PATIENT'S NAME: Nancy Montemayor  MRN:   [id number]  :   1960  ACCT. NUMBER: [id number]  DATE OF SERVICE: 23  START TIME: 11:00 AM  END TIME: 11:50 AM  FACILITATOR: Nancy Martins LICSW  TOPIC: MH EBP Group: Coping Skills  [facility] Mental Health Day Treatment  TRACK: 4A    NUMBER OF PARTICIPANTS: 7    Summary of Group / Topics Discussed:  Coping Skills: Additional Coping Skills:  Patients discussed and practiced Distress tolerance.  Reviewed the benefits of applying the aforementioned coping strategies.  Patients explored how these strategies might be applied to daily stressors or distressing situations.     Patient Session Goals / Objectives:    Understand the purpose and benefits of applying Distress tolerance coping strategies    Educated about Willingness vs. Willfulness; half smiling and open hands skill.    Address barriers to utilizing coping skills when in distress.      Patient Participation / Response:  Fully participated with the group by sharing personal reflections / insights and openly received / provided feedback with other participants.    Demonstrated understanding of topics discussed through group discussion and participation and Expressed understanding of the relevance / importance of coping skills at distressing times in life    Treatment Plan:  Patient has a current master individualized treatment plan.  See Epic treatment plan for more information.    RAMIREZ Rosas

## 2023-05-24 ENCOUNTER — HOSPITAL ENCOUNTER (OUTPATIENT)
Dept: BEHAVIORAL HEALTH | Facility: CLINIC | Age: 63
Discharge: HOME | End: 2023-05-24
Attending: PSYCHIATRY & NEUROLOGY
Payer: COMMERCIAL

## 2023-05-24 DIAGNOSIS — F33.1 MODERATE EPISODE OF RECURRENT MAJOR DEPRESSIVE DISORDER (H): Primary | ICD-10-CM

## 2023-05-24 PROCEDURE — 90853 GROUP PSYCHOTHERAPY: CPT

## 2023-05-24 PROCEDURE — 90853 GROUP PSYCHOTHERAPY: CPT | Performed by: SOCIAL WORKER

## 2023-05-24 NOTE — PROGRESS NOTES
Acknowledgement of Current Treatment Plan       I have reviewed my treatment plan with my therapist, ROBERT Alegre, LICSW,      I agree with the plan as it is written in the electronic health record. (4A Track)      Name:      Signature:  Nancy Walsh MD  Psychiatrist/Medical Director      ROBERT Rao, SW  Psychotherapist      ROBERT Alegre, [facility]  Psychotherapist

## 2023-05-24 NOTE — GROUP NOTE
Process Group Note    PATIENT'S NAME: Nancy Montemayor  MRN:   [id number]  :   1960  ACCT. NUMBER: [id number]  DATE OF SERVICE: 23  START TIME:  9:00 AM  END TIME:  9:50 AM  FACILITATOR: Domenica Mckeon LGSW  TOPIC:  Process Group    Diagnoses:  296.32 (F33.1) Major Depressive Disorder, Recurrent Episode, Moderate       [facility] Day Treatment  TRACK: 4A    NUMBER OF PARTICIPANTS: 5          Data:    Session content: At the start of this group, patients were invited to check in by identifying themselves, describing their current emotional status, and identifying issues to address in this group.   Area(s) of treatment focus addressed in this session included Symptom Management, Personal Safety and Community Resources/Discharge Planning.  Client reported mood is well-rested.  Client denied safety concerns, including SI and SIB. Client denies any chemical use.  Client is taking medications as prescribed. Client stated she didn't take her medication at the right time but still got sleep. Client's goal is set an alarm for her sleeping pill and put it somewhere she will see it. Client will apply the following skills: problem solving.  Client is proud of she went to [employer] and saw her mother for dinner. Peers offered supportive feedback and validation.      Therapeutic Interventions/Treatment Strategies:  Psychotherapist offered support, feedback and validation. Treatment modalities used include Cognitive Behavioral Therapy. Interventions include Behavioral Activation: set alarm as medication reminder.    Assessment:    Patient response:   Patient responded to session by accepting feedback, giving feedback, listening and focusing on goals    Possible barriers to participation / learning include: and no barriers identified    Health Issues:   None reported       Substance Use Review:   Substance Use: No active concerns identified.    Mental Status/Behavioral  Observations  Appearance:   Appropriate   Eye Contact:   Good   Psychomotor Behavior: Normal   Attitude:   Cooperative  Interested Friendly Pleasant  Orientation:   All  Speech   Rate / Production: Normal/ Responsive   Volume:  Normal   Mood:    Anxious   Affect:    Appropriate   Thought Content:   Clear and Safety denies any current safety concerns including suicidal ideation, self-harm, and homicidal ideation  Thought Form:  Coherent  Logical     Insight:    Good     Plan:     Safety Plan: No current safety concerns identified.  Recommended that patient call 911 or go to the local ED should there be a change in any of these risk factors.     Barriers to treatment: None identified    Patient Contracts (see media tab):  None    Substance Use: Not addressed in session     Continue or Discharge: Patient will continue in Adult Day Treatment (ADT)  as planned. Patient is likely to benefit from learning and using skills as they work toward the goals identified in their treatment plan.      JAYDEN Rao  May 24, 2023

## 2023-05-24 NOTE — GROUP NOTE
Psychotherapy Group Note    PATIENT'S NAME: Nancy Montemayor  MRN:   [id number]  :   1960  ACCT. NUMBER: [id number]  DATE OF SERVICE: 23  START TIME: 10:00 AM  END TIME: 10:50 AM  FACILITATOR: Domenica Mckeon LGSW  TOPIC: MH EBP Group: Coping Skills  [facility] Mental Health Day Treatment  TRACK: 4A    NUMBER OF PARTICIPANTS: 5    Summary of Group / Topics Discussed:  Coping Skills: Additional Coping Skills:  Patients discussed and practiced TIP.  Reviewed the benefits of applying the aforementioned coping strategies.  Patients explored how these strategies might be applied to daily stressors or distressing situations.    Patient Session Goals / Objectives:    Understand the purpose and benefits of applying the TIP coping strategies    Practice of strategies    Address barriers to utilizing coping skills when in distress.        Patient Participation / Response:  Fully participated with the group by sharing personal reflections / insights and openly received / provided feedback with other participants.    Demonstrated understanding of topics discussed through group discussion and participation, Expressed understanding of the relevance / importance of coping skills at distressing times in life and Demonstrated knowledge of when to consider using a variety of coping skills in daily life    Treatment Plan:  Patient has a current master individualized treatment plan.  See Epic treatment plan for more information.    JAYDEN Rao

## 2023-05-24 NOTE — GROUP NOTE
Psychoeducation Group Note    PATIENT'S NAME: Nancy Montemayor  MRN:   [id number]  :   1960  ACCT. NUMBER: [id number]  DATE OF SERVICE: 23  START TIME: 11:00 AM  END TIME: 11:50 AM  FACILITATOR: Nancy Martins LICSW  TOPIC: MH Life Skills Group: Resiliency Development  [facility] Adult Mental Health Day Treatment  TRACK: 4A    NUMBER OF PARTICIPANTS: 4    Summary of Group / Topics Discussed:  Resiliency Development:  Coping Skills (Vision Statements for Mental Health Recovery):  Patients were taught how to identify stressors, signs of stress, coping skills, and prevention strategies for overall stress management.  Patients were given the opportunity to identify both ongoing and acute mental health symptoms and how to effectively manage these symptoms by developing an effective aftercare plan.  Patients increased awareness of community based resources.    Patient Session Goals / Objectives:    Identified how using coping skills can be used for symptom and stress management       Improved awareness of individualed symptoms and stressors and how to effectively cope     Established a relapse prevention plan to practice these skills in their own environments    Practiced and reflected on how to generalize taught skills to their everyday life        Patient Participation / Response:  Fully participated with the group by sharing personal reflections / insights and openly received / provided feedback with other participants.    Verbalized understanding of content    Treatment Plan:  Patient has a current master individualized treatment plan.  See Epic treatment plan for more information.    RAMIREZ Rosas

## 2023-05-26 ENCOUNTER — HOSPITAL ENCOUNTER (OUTPATIENT)
Dept: BEHAVIORAL HEALTH | Facility: CLINIC | Age: 63
Discharge: HOME | End: 2023-05-26
Attending: PSYCHIATRY & NEUROLOGY
Payer: COMMERCIAL

## 2023-05-26 DIAGNOSIS — F33.1 MODERATE EPISODE OF RECURRENT MAJOR DEPRESSIVE DISORDER (H): Primary | ICD-10-CM

## 2023-05-26 PROCEDURE — 99214 OFFICE O/P EST MOD 30 MIN: CPT | Mod: 95

## 2023-05-26 NOTE — PROGRESS NOTES
"[facility]   Adult Mental Health Outpatient Programs  Provider Interval History Note    Program: Intensive Outpatient Program (track 4A)    PATIENT'S NAME: Nancy Montemayor  MRN:   [id number]  :   1960  ACCT. NUMBER: [id number]  DATE OF SERVICE: 23    Interval History:  \"Sleep problem.\" Nancy presents today for follow-up and ongoing program supervision.   Endorses:    Taking on Buspar in the morning  o Not one in the afternoon    Duloxetine at night    Trazodone   o Set alarm at 9 pm  o Woke up 5 am then took another help    I have appointment with psychiatrist next week  o I want to talk about Bupropion, it worked well     Symptoms/Systems:    Sleep: See above    Appetite: no problem    Daily function/ADLs: it is ok    Hygiene: I am fine    Socialization: A little bit    Concerns about work or ability to work: N/A    Substance use:    THC gummies    Reactions/thoughts about program:    Very good, I like it    Safety Assessment:    Suicidal ideation: denies current or recent suicidal ideation or behavior    Thoughts of non-suicidal self-injury: denied    Recent self-injurious behavior: denied    Homicidal ideation: denied    Other safety concerns: denied    Medications:  Current Outpatient Medications   Medication Sig Dispense Refill     ALPRAZolam (XANAX) 0.25 MG tablet Take 0.25 mg by mouth as needed       amLODIPine (NORVASC) 5 MG tablet Take 1 tablet by mouth daily       ARIPiprazole (ABILIFY) 2 MG tablet Take 2 mg by mouth daily (Patient not taking: Reported on 2023)       atorvastatin (LIPITOR) 20 MG tablet Take 20 mg by mouth       buPROPion (WELLBUTRIN XL) 150 MG 24 hr tablet Take 1 tablet (150 mg) by mouth every morning (Patient not taking: Reported on 2023) 30 tablet 0     buPROPion (WELLBUTRIN XL) 300 MG 24 hr tablet Take 1 tablet (300 mg) by mouth every morning (Patient not taking: Reported on 2023)       busPIRone (BUSPAR) 5 MG tablet Take " 10 mg by mouth 2 times daily       DULoxetine (CYMBALTA) 30 MG capsule Take 30 mg by mouth daily       PREMPRO 0.3-1.5 MG tablet Take 1 tablet by mouth daily at 2 pm       traZODone (DESYREL) 50 MG tablet Take 25-50 mg by mouth At Bedtime       Vitamin D, Cholecalciferol, 25 MCG (1000 UT) CAPS          The above list was reviewed with patient today.     Patient is taking medications as prescribed and denies adverse effects    Laboratory Results:  Most recent labs reviewed. Pertinent updates/findings: None.     Metrics:  PHQ-9 scores:       3/16/2023    10:00 AM 3/16/2023     1:11 PM   PHQ-9 SCORE   PHQ-9 Total Score MyChart  15 (Moderately severe depression)   PHQ-9 Total Score 23 15       RAMAN-7 scores:       3/16/2023    10:00 AM 3/16/2023     1:14 PM   RAMAN-7 SCORE   Total Score  7 (mild anxiety)   Total Score 9 7       CSSR-S: [place] Suicide Severity Rating Scale (Short Version)      3/14/2023     9:30 PM   [place] Suicide Severity Rating (Short Version)   Over the past 2 weeks have you felt down, depressed, or hopeless? yes   Over the past 2 weeks have you had thoughts of killing yourself? yes   Have you ever attempted to kill yourself? no   Q1 Wished to be Dead (Past Month) yes   Q2 Suicidal Thoughts (Past Month) yes   Q3 Suicidal Thought Method yes   Q4 Suicidal Intent without Specific Plan no   Q5 Suicide Intent with Specific Plan yes   Q6 Suicide Behavior (Lifetime) no   Level of Risk per Screen high risk   High Risk Required Interventions On continuous in person observation   Required Interventions Belongings removed;Patient searched;Room made safe   Interventions DEC consulted         Mental Status Examination:  Vital Signs: There were no vitals taken for this visit.   Appearance: adequately groomed, appears stated age, and in no apparent distress.  Attitude: cooperative   Eye Contact: good   Muscle Strength and Tone: no gross abnormalities   Psychomotor Behavior: normal or unremarkable   Gait and  "Station: normal width, turn, arm swing  Speech: normal rate, production, volume, and rhythm of  Associations: No loosening of associations  Thought Process: coherent and goal directed  Thought Content: no evidence of suicidal ideation or homicidal ideation, no evidence of psychotic thought, no auditory hallucinations present and no visual hallucinations present  Mood: \"anxious and depressed\"  Affect: mood congruent  Insight: good  Judgment: intact, adequate for safety  Impulse Control: intact  Oriented to: time, place, person and situation  Attention Span and Concentration: normal  Language: Intact  Recent and Remote Memory: intact to interview. Not formally assessed. No amnesia.  Fund of Knowledge/Assessment of Intelligence: Average  Capacity of Activities of Daily Living: Independent, able to participate in programmatic care services.    Diagnosis/es:    ICD-10-CM    1. Moderate episode of recurrent major depressive disorder (H)  F33.1         Assessment/Plan:  Nancy presents today for follow up. Start taking  Duloxetine at in the morning, continue with 1 dose of Buspar in the morning, take Trazodone at bedtime. Patient endorses and demonstrates benefit from psychotherapy.  Patient will continue with this level of care.  Next follow-up visit with this patient in about 4 weeks or sooner as needed      296.32 (F33.1) Major Depressive Disorder, Recurrent Episode, Moderate  ? Overall improved   ? Engage in psychotherapy  ? Continue working with transition clinic for medication management  ? Continue with current medication regimen  - Change Buspirone 10 mg ONCE in the morning  - Take Trazodone 25 mg at bedtime NO LATER THAN 9 pm  - Take Duloxetine in the morning  ? Continue working on improving sleep hygiene  ? Maintain a balanced diet  ? Engage in physical activities as tolerated    Continue all other medications as reviewed per electronic medical record today    Continue therapy as planned:    Enrolled in Intensive " Outpatient Program (track 4A)    Continues to benefit from services    Continues to meet criteria for this level of care    Patient would be at reasonable risk of requiring a higher level of care in the absence of current services.    I feel this patient does not meet criteria for an involuntary hold and is appropriate for treatment at an outpatient level of care.    Continue with individual therapist as appropriate    Safety plan reviewed:    To the Emergency Department as needed or call after hours crisis line at [phone] or [phone]. [place] Crisis Text Line: Text [place] to 748737 or Suicide LifeLine Chat: suicide500Friends.org/chat    Follow-up:     schedule an appointment with me or another program provider in approximately in 4 week(s) or sooner if needed.  Can speak with a staff member or call the appropriate program number (see below) to schedule    Follow up with outpatient provider(s) as planned or sooner if needed for acute medical concerns.    Questions or concerns:    Call program line with questions or concerns (see below)    Foodilyhart may be used to communicate with your provider, but this is not intended to be used for emergencies.    [facility] Adult Mental Health Program lines:  [facility]: [phone]  Dual Disorder: [phone]  Adult Day Treatment:  [phone]  55+/Intensive Outpatient: [phone]    Community Resources:    National Suicide Prevention Lifeline: 988 from any phone, or 030-371-8542 (TTY: 598.400.5164). Call anytime for help.  (www.suicidepreventionlifeline.org)  National [place] on Mental Illness (www.ycril.org): 227.816.7997 or 828-426-4974.   Mental Health Association (www.mentalhealth.org): 813.585.8239 or 200-963-1183.  [place] Crisis Text Line: Text [place] to 676122  Suicide LifeLine Chat: suicide500Friends.org/chat    Treatment Objective(s) Addressed in This Session:  The purpose of today's virtual visit is for this writer to provide  oversight of patient's care while receiving program services. Specific treatment goals addressed included personal safety, symptoms stabilization and management, wellness and mental health, and community resources/discharge planning.     This author or another program provider will follow up with the patient as noted above.     Patient agrees with the current plan of care.    JAZZY DONG CNP  5/26/23      Visit Details:  Type of service: In-person    Location (patient and provider): [facility] Adult Mental Health Outpatient Programmatic Care Offices        Medical Decision Making  The patient's presentation was of moderate complexity (2 or more stable chronic illnesses).    The patient's evaluation involved:  history and exam without other MDM data elements    The patient's management necessitated only low risk treatment.       30 minutes spent on the date of the encounter doing chart review, patient visit, documentation and discussion with other provider(s)    This document completed in part using Dragon Medical One dictation software.  Please excuse any inadvertent word or phrase substitutions.

## 2023-05-30 ENCOUNTER — HOSPITAL ENCOUNTER (OUTPATIENT)
Dept: BEHAVIORAL HEALTH | Facility: CLINIC | Age: 63
Discharge: HOME | End: 2023-05-30
Attending: PSYCHIATRY & NEUROLOGY
Payer: COMMERCIAL

## 2023-05-30 PROCEDURE — 90853 GROUP PSYCHOTHERAPY: CPT | Performed by: SOCIAL WORKER

## 2023-05-30 PROCEDURE — 90853 GROUP PSYCHOTHERAPY: CPT

## 2023-05-30 NOTE — GROUP NOTE
Process Group Note    PATIENT'S NAME: Nancy Montemayor  MRN:   [id number]  :   1960  ACCT. NUMBER: [id number]  DATE OF SERVICE: 23  START TIME:  9:00 AM  END TIME:  9:50 AM  FACILITATOR: Domenica Mckeon LGSW  TOPIC:  Process Group    Diagnoses:  296.32 (F33.1) Major Depressive Disorder, Recurrent Episode, Moderate     [facility] Day Treatment  TRACK: 4A IOP    NUMBER OF PARTICIPANTS: 6          Data:    Session content: At the start of this group, patients were invited to check in by identifying themselves, describing their current emotional status, and identifying issues to address in this group.   Area(s) of treatment focus addressed in this session included Symptom Management, Personal Safety and Community Resources/Discharge Planning.  Client reported mood is frustrated. Client denied safety concerns, including SI and SIB. Client denies any chemical use.  Client is taking medications as prescribed. Client stated she is frustrated to be gaining weight from her medications while trying to lose weight from her healthy life style. Client's goal is see her mom for dinner and see her therapist. Client will apply the following skills: organized with time.  Client is grateful for time with her friend at her pool. Peers offered supportive feedback and validation.      Therapeutic Interventions/Treatment Strategies:  Psychotherapist offered support, feedback and validation. Treatment modalities used include Cognitive Behavioral Therapy. Interventions include Behavioral Activation: Encouraged strategies to reduce individual procrastination and increase motivation by increasing goal-directed activities to enhance mood and reduce symptoms..    Assessment:    Patient response:   Patient responded to session by accepting feedback, giving feedback, listening and focusing on goals    Possible barriers to participation / learning include: and no barriers identified    Health Issues:   None  reported       Substance Use Review:   Substance Use: No active concerns identified.    Mental Status/Behavioral Observations  Appearance:   Appropriate   Eye Contact:   Good   Psychomotor Behavior: Normal   Attitude:   Cooperative  Interested Friendly Pleasant  Orientation:   All  Speech   Rate / Production: Normal/ Responsive   Volume:  Normal   Mood:    Depressed  Agitated  Affect:    Appropriate   Thought Content:   Clear and Safety denies any current safety concerns including suicidal ideation, self-harm, and homicidal ideation  Thought Form:  Coherent  Logical     Insight:    Good     Plan:     Safety Plan: No current safety concerns identified.  Recommended that patient call 911 or go to the local ED should there be a change in any of these risk factors.     Barriers to treatment: None identified    Patient Contracts (see media tab):  None    Substance Use: Not addressed in session     Continue or Discharge: Patient will continue in Adult Day Treatment (ADT)  as planned. Patient is likely to benefit from learning and using skills as they work toward the goals identified in their treatment plan.      Domenica Mckeon, JAYDEN  May 30, 2023

## 2023-05-30 NOTE — GROUP NOTE
Psychotherapy Group Note    PATIENT'S NAME: Nancy Montemayor  MRN:   [id number]  :   1960  ACCT. NUMBER: [id number]  DATE OF SERVICE: 23  START TIME: 11:00 AM  END TIME: 11:50 AM  FACILITATOR: Nancy Martins LICSW  TOPIC: MH EBP Group: Symptom Awareness  [facility] Day Treatment  TRACK: 4a    NUMBER OF PARTICIPANTS: 5    Summary of Group / Topics Discussed:  Symptom Awareness: Mood Disorders: Patients received a general overview of mood disorders including depressive disorders, anxiety disorders, and bipolar disorders and how it relates to their current symptoms. The purpose is to promote understanding of their diagnoses and how it impacts their functioning. Patients reviewed their current awareness of symptoms and diagnoses. Patients received information regarding diagnoses, etiology, cultural, and environmental factors as well as impact on functioning.     Patient Session Goals / Objectives:    Discussed patient individual symptoms and experiences    Reviewed diagnostic criteria and etiology of diagnoses       Patient Participation / Response:  Fully participated with the group by sharing personal reflections / insights and openly received / provided feedback with other participants.    Demonstrated understanding of topics discussed through group discussion and participation and Demonstrated understanding of how information regarding symptoms can assist in management of symptoms    Treatment Plan:  Patient has a current master individualized treatment plan.  See Epic treatment plan for more information.    RAMIREZ Rosas

## 2023-05-30 NOTE — GROUP NOTE
Psychotherapy Group Note    PATIENT'S NAME: Nancy Montemayor  MRN:   [id number]  :   1960  ACCT. NUMBER: [id number]  DATE OF SERVICE: 23  START TIME: 10:00 AM  END TIME: 10:50 AM  FACILITATOR: Domenica Mckeon LGSW  TOPIC: MH EBP Group: Coping Skills  [facility] [place] [facility]  TRACK: 4A    NUMBER OF PARTICIPANTS: 6    Summary of Group / Topics Discussed:  Coping Skills: Radical Acceptance: Patients learned radical acceptance as a way to tolerate heightened stress in the moment.  Patients identified situations that necessitate radical acceptance.  They focused on applying acceptance of the moment to tolerate difficult emotions and events. Patients discussed how to distinguish when this can be useful in their lives and when other skills are more relevant or helpful.    Patient Session Goals / Objectives:    Understand that some amount of pain exists for each of us in our lives    Process the difficulty of acceptance in our lives and benefits of radical acceptance to mood and functioning.    Demonstrate understanding of when to use the radical acceptance to tolerate distress by providing examples of situations where this could be applied.    Identify barriers to applying radical acceptance to difficult situations and explore strategies to overcome them        Patient Participation / Response:  Fully participated with the group by sharing personal reflections / insights and openly received / provided feedback with other participants.    Demonstrated understanding of topics discussed through group discussion and participation, Expressed understanding of the relevance / importance of coping skills at distressing times in life and Demonstrated knowledge of when to consider using a variety of coping skills in daily life    Treatment Plan:  Patient has a current master individualized treatment plan.  See Epic treatment plan for more information.    JAYDEN Rao

## 2023-05-31 ENCOUNTER — HOSPITAL ENCOUNTER (OUTPATIENT)
Dept: BEHAVIORAL HEALTH | Facility: CLINIC | Age: 63
Discharge: HOME | End: 2023-05-31
Attending: PSYCHIATRY & NEUROLOGY
Payer: COMMERCIAL

## 2023-05-31 DIAGNOSIS — F33.1 MODERATE EPISODE OF RECURRENT MAJOR DEPRESSIVE DISORDER (H): Primary | ICD-10-CM

## 2023-05-31 PROCEDURE — 90853 GROUP PSYCHOTHERAPY: CPT

## 2023-05-31 NOTE — PROGRESS NOTES
Adult Mental Health Intensive Outpatient Discharge Summary/Instructions      Patient: Nancy Montemayor MRN: [id number]   : 1960 Age: 62 year old Sex: female     Admission Date: 3/22/2023  Discharge Date: 2023  Diagnosis: 296.32 (F33.1) Major Depressive Disorder, Recurrent Episode, Moderate         Focus of Treatment / Progress    Personal Safety:      * Follow your safety plan     * Call crisis lines as needed:    [facility] [phone] [facility] [phone]  [facility] [phone] [facility] [phone] [facility] [phone]  [facility] [phone] National Suicide Prevention [phone]  [facility] [phone] Suicide Prevention [phone]  [facility] [phone]    Managing symptoms of:  Depressed mood, worry, interrupted sleep pattern    Community support/health:    [employer] [place] [employer] (www.cyril.org): 364.919.3304 or 868-937-5805.   Mental Health Association (www.mentalhealth.org): 821.769.1205 or 627-271-0887.  [place] Crisis Text Line: Text [place] to 317284  Suicide LifeLine Chat: suicidepreventionlifeline.org/chat  [facility] Linkage Line 1-761.367.8107    Managing Symptoms and Preventing Relapse    * Go to all of your appointments    * Take all medications as directed.      * Carry a current list if medication with you    * Do not use illicit (street) drugs.  Avoid alcohol    * Report these symptoms to your care team. These are early signs of relapse:   Thoughts of suicide   Losing more sleep   Increased confusion   Mood getting worse          *Use these skills daily:  Mindfulness, practice self-compassion, practice authenticity in making choices, maintain balance in schedule (time for self and others, time for relaxation and time for activities, time for leisure and time for tasks, time at home and time out of the house), assert needs and set limits with others as needed, practice intentional physical relaxation, challenge  negative thoughts/use affirming and encouraging self-talk, engage with support people on regular basis, practice good self-care (sleep hygiene, balanced eating, regular rest, take care of medical needs, maintain personal hygiene, self-nurturing activities), acknowledge and accept your feelings, use sensory modulation skills plan       Copy of summary sent to: Nancy-patient    Follow up with psychiatrist / main caregiver: Dr. Cosme    Next visit: 7/16/2023    Follow up with your therapist: Madhuri Kahn   Next visit: 7/13/2023    Go to group therapy and / or support groups at: [facility] beginning on Thursday, July 6 from 1:00-3:00 pm. This track meets one day a week on Thursdays. [phone]    See your medical doctor about:  Any physical health issues or concerns.      Client Signature:_____See Media__________________  Date / Time:___________  Staff Signature:________________________   Date / Time:___________

## 2023-05-31 NOTE — GROUP NOTE
Process Group Note    PATIENT'S NAME: Nancy Montemayor  MRN:   [id number]  :   1960  ACCT. NUMBER: [id number]  DATE OF SERVICE: 23  START TIME:  9:00 AM  END TIME:  9:50 AM  FACILITATOR: Domenica Mckeon LGSW  TOPIC:  Process Group    Diagnoses:  296.32 (F33.1) Major Depressive Disorder, Recurrent Episode, Moderate       [facility] Day Treatment  TRACK: 4A    NUMBER OF PARTICIPANTS: 4          Data:    Session content: At the start of this group, patients were invited to check in by identifying themselves, describing their current emotional status, and identifying issues to address in this group.   Area(s) of treatment focus addressed in this session included Symptom Management, Personal Safety and Community Resources/Discharge Planning.  Client reported mood is tired and sick with sore throat.  Client denied safety concerns, including SI and SIB. Client denies any chemical use.  Client is taking medications as prescribed. Client stated she had a really helpful appointment with her therapist. Client's goal is to see her chiropractor. Client will apply the following skills: rest and listen to her body.  Client is grateful for her friends who support her taking care of her mental health. Peers offered supportive feedback and validation.      Therapeutic Interventions/Treatment Strategies:  Psychotherapist offered support, feedback and validation. Treatment modalities used include Cognitive Behavioral Therapy. Interventions include Coping Skills: allowing rest.    Assessment:    Patient response:   Patient responded to session by accepting feedback, giving feedback, listening and focusing on goals    Possible barriers to participation / learning include: and no barriers identified    Health Issues:   None reported       Substance Use Review:   Substance Use: No active concerns identified.    Mental Status/Behavioral Observations  Appearance:   Appropriate   Eye Contact:   Good    Psychomotor Behavior: Normal   Attitude:   Cooperative  Interested Friendly Pleasant  Orientation:   All  Speech   Rate / Production: Normal/ Responsive   Volume:  Soft   Mood:    Anxious   Affect:    Appropriate   Thought Content:   Clear and Safety denies any current safety concerns including suicidal ideation, self-harm, and homicidal ideation  Thought Form:  Coherent  Logical     Insight:    Good     Plan:     Safety Plan: No current safety concerns identified.  Recommended that patient call 911 or go to the local ED should there be a change in any of these risk factors.     Barriers to treatment: None identified    Patient Contracts (see media tab):  None    Substance Use: Not addressed in session     Continue or Discharge: Patient will continue in Adult Day Treatment (ADT)  as planned. Patient is likely to benefit from learning and using skills as they work toward the goals identified in their treatment plan.      Domenica Mckeon, JAYDEN  May 31, 2023

## 2023-06-06 ENCOUNTER — HOSPITAL ENCOUNTER (OUTPATIENT)
Dept: BEHAVIORAL HEALTH | Facility: CLINIC | Age: 63
Discharge: HOME | End: 2023-06-06
Attending: PSYCHIATRY & NEUROLOGY
Payer: COMMERCIAL

## 2023-06-06 ENCOUNTER — TELEPHONE (OUTPATIENT)
Dept: BEHAVIORAL HEALTH | Facility: CLINIC | Age: 63
End: 2023-06-06
Payer: COMMERCIAL

## 2023-06-06 DIAGNOSIS — F33.1 MODERATE EPISODE OF RECURRENT MAJOR DEPRESSIVE DISORDER (H): Primary | ICD-10-CM

## 2023-06-06 PROCEDURE — 90853 GROUP PSYCHOTHERAPY: CPT

## 2023-06-06 PROCEDURE — 90853 GROUP PSYCHOTHERAPY: CPT | Performed by: SOCIAL WORKER

## 2023-06-06 NOTE — GROUP NOTE
Process Group Note    PATIENT'S NAME: Nancy Montemayor  MRN:   [id number]  :   1960  ACCT. NUMBER: [id number]  DATE OF SERVICE: 23  START TIME:  9:00 AM  END TIME:  9:50 AM  FACILITATOR: Domenica Mckeon LGSW  TOPIC:  Process Group    Diagnoses:  296.32 (F33.1) Major Depressive Disorder, Recurrent Episode, Moderate       [facility] Day Treatment  TRACK: 4A    NUMBER OF PARTICIPANTS: 6          Data:    Session content: At the start of this group, patients were invited to check in by identifying themselves, describing their current emotional status, and identifying issues to address in this group.   Area(s) of treatment focus addressed in this session included Symptom Management, Personal Safety and Community Resources/Discharge Planning.  Client reported mood is tired from hosting friends for cards last night.  Client denied safety concerns, including SI and SIB. Client denies any chemical use.  Client is taking medications as prescribed. Client stated her physician took her off BuSpar to support her weight loss goals. Client's goal is to take take to relax. Client will apply the following skills: listening to her emotional and physical needs.  Client is grateful for her supportive friends. Peers offered supportive feedback and validation.      Therapeutic Interventions/Treatment Strategies:  Treatment modalities used include Cognitive Behavioral Therapy.    Assessment:    Patient response:   Patient responded to session by accepting feedback, giving feedback, listening and focusing on goals    Possible barriers to participation / learning include: and no barriers identified    Health Issues:   None reported       Substance Use Review:   Substance Use: No active concerns identified.    Mental Status/Behavioral Observations  Appearance:   Appropriate   Eye Contact:   Good   Psychomotor Behavior: Normal   Attitude:   Cooperative  Interested Friendly  Pleasant  Orientation:   All  Speech   Rate / Production: Normal/ Responsive   Volume:  Normal   Mood:    Anxious   Affect:    Appropriate   Thought Content:   Clear and Safety denies any current safety concerns including suicidal ideation, self-harm, and homicidal ideation  Thought Form:  Coherent  Logical     Insight:    Good     Plan:     Safety Plan: No current safety concerns identified.  Recommended that patient call 911 or go to the local ED should there be a change in any of these risk factors.     Barriers to treatment: None identified    Patient Contracts (see media tab):  None    Substance Use: Not addressed in session     Continue or Discharge: Patient will continue in Adult Day Treatment (ADT)  as planned. Patient is likely to benefit from learning and using skills as they work toward the goals identified in their treatment plan.      Domenica Mckeon, JAYDEN  June 6, 2023

## 2023-06-06 NOTE — GROUP NOTE
Psychotherapy Group Note    PATIENT'S NAME: Nancy Montemayor  MRN:   [id number]  :   1960  ACCT. NUMBER: [id number]  DATE OF SERVICE: 23  START TIME: 11:00 AM  END TIME: 11:50 AM  FACILITATOR: Nancy Martins LICSW  TOPIC: MH EBP Group: Self-Awareness  [facility] Day Treatment  TRACK: 4A    NUMBER OF PARTICIPANTS: 6    Summary of Group / Topics Discussed:  Self-Awareness: Gratitude: Topic focused on assisting patients in identifying key concepts in gratitude. Patients discussed the benefits of practicing gratitude and its impact on mood improvement, mindfulness, and perspective. Patients worked to increase time spent on recognition and appreciation of what is positive and working in their lives. Patients discussed the concepts and benefits of feeling grateful. The goal is to reduce rumination and negative thinking resulting in increased mindfulness and resilience. Patients specifically discussed how they can practice and problem solve barriers to daily gratitude practice.     Patient Session Goals / Objectives:    Deans the concept and benefits of gratitude    Identified ways to practice gratitude in daily life    Problem solved barriers to practicing gratitude      Patient Participation / Response:  Fully participated with the group by sharing personal reflections / insights and openly received / provided feedback with other participants.    Demonstrated understanding of topics discussed through group discussion and participation, Verbalized understanding of ways to proactively manage illness and Practiced skills in session    Treatment Plan:  Patient has a current master individualized treatment plan.  See Epic treatment plan for more information.    RAMIREZ Rosas

## 2023-06-06 NOTE — GROUP NOTE
Psychotherapy Group Note    PATIENT'S NAME: Nancy Montemayor  MRN:   [id number]  :   1960  ACCT. NUMBER: [id number]  DATE OF SERVICE: 23  START TIME: 10:00 AM  END TIME: 10:50 AM  FACILITATOR: Domenica Mckeon LGSW  TOPIC: MH EBP Group: Coping Skills  [facility] [place] [facility] Day Treatment  TRACK: 4a    NUMBER OF PARTICIPANTS: 6    Summary of Group / Topics Discussed:  Coping Skills: Grounding: Patients discussed and practiced strategies to increase attachment / presence to the current moment.  Patients identified situations in which using these strategies will help improve emotion regulation sense of calm in the body.  Reviewed the benefits of applying grounding strategies, as well as past / current practices of each member.  Patients identified situations in which using these strategies would reduce stress. They developed the ability to distinguish when these strategies can be useful in their lives for management and stress and psychological well-being.    Patient Session Goals / Objectives:    Understand the purpose of using grounding strategies to reduce stress.    Verbalize understanding of how and when to apply grounding strategies to reduce distress and increase presence in the moment.    Review patients current grounding practices and discuss a more formal way of practicing and accessing skills.    Practice using various calming strategies (e.g. 5-4-3-2-1; mental and body awareness).    Choose 1-2 grounding strategies to apply during times of distress.        Patient Participation / Response:  Fully participated with the group by sharing personal reflections / insights and openly received / provided feedback with other participants.    Demonstrated understanding of topics discussed through group discussion and participation, Expressed understanding of the relevance / importance of coping skills at distressing times in life and Demonstrated knowledge of when to consider using a variety  of coping skills in daily life    Treatment Plan:  Patient has a current master individualized treatment plan.  See Epic treatment plan for more information.    JAYDEN Rao

## 2023-06-07 ENCOUNTER — HOSPITAL ENCOUNTER (OUTPATIENT)
Dept: BEHAVIORAL HEALTH | Facility: CLINIC | Age: 63
Discharge: HOME | End: 2023-06-07
Attending: PSYCHIATRY & NEUROLOGY
Payer: COMMERCIAL

## 2023-06-07 DIAGNOSIS — F33.1 MODERATE EPISODE OF RECURRENT MAJOR DEPRESSIVE DISORDER (H): Primary | ICD-10-CM

## 2023-06-07 PROCEDURE — 90853 GROUP PSYCHOTHERAPY: CPT | Performed by: SOCIAL WORKER

## 2023-06-07 PROCEDURE — 90853 GROUP PSYCHOTHERAPY: CPT

## 2023-06-07 NOTE — GROUP NOTE
Process Group Note    PATIENT'S NAME: Nancy Montemayor  MRN:   [id number]  :   1960  ACCT. NUMBER: [id number]  DATE OF SERVICE: 23  START TIME:  9:00 AM  END TIME:  9:50 AM  FACILITATOR: Domenica Mckeon LGSW  TOPIC:  Process Group    Diagnoses:  296.32 (F33.1) Major Depressive Disorder, Recurrent Episode, Moderate       [facility] Day Treatment  TRACK: 4A    NUMBER OF PARTICIPANTS: 7            Data:    Session content: At the start of this group, patients were invited to check in by identifying themselves, describing their current emotional status, and identifying issues to address in this group.   Area(s) of treatment focus addressed in this session included Symptom Management, Personal Safety and Community Resources/Discharge Planning.  Client reported mood is frustrated. Client denied safety concerns, including SI and SIB. Client denies any chemical use.  Client is taking medications as prescribed. Client stated she has tried to shop online without success and now has to return items which is intimidating. She called her sister for support on the process. Client's goal is to go to [employer] and return items. Client will apply the following skills: positive self-talk.  Client is grateful for the help of her sister. Peers offered supportive feedback and validation.      Therapeutic Interventions/Treatment Strategies:  Psychotherapist offered support, feedback and validation. Treatment modalities used include Cognitive Behavioral Therapy. Interventions include Mindfulness: positive self-talk.    Assessment:    Patient response:   Patient responded to session by accepting feedback, giving feedback, listening and focusing on goals    Possible barriers to participation / learning include: and no barriers identified    Health Issues:   None reported       Substance Use Review:   Substance Use: No active concerns identified.    Mental Status/Behavioral  Observations  Appearance:   Appropriate   Eye Contact:   Good   Psychomotor Behavior: Normal   Attitude:   Cooperative  Interested Friendly Pleasant  Orientation:   All  Speech   Rate / Production: Normal/ Responsive   Volume:  Normal   Mood:    Anxious  Depressed   Affect:    Appropriate   Thought Content:   Clear and Safety denies any current safety concerns including suicidal ideation, self-harm, and homicidal ideation  Thought Form:  Coherent  Logical     Insight:    Good     Plan:     Safety Plan: No current safety concerns identified.  Recommended that patient call 911 or go to the local ED should there be a change in any of these risk factors.     Barriers to treatment: None identified    Patient Contracts (see media tab):  None    Substance Use: Not addressed in session     Continue or Discharge: Patient will continue in Adult Day Treatment (ADT)  as planned. Patient is likely to benefit from learning and using skills as they work toward the goals identified in their treatment plan.      JAYDEN Rao  June 7, 2023

## 2023-06-07 NOTE — GROUP NOTE
Psychotherapy Group Note    PATIENT'S NAME: Nancy Montemayor  MRN:   [id number]  :   1960  ACCT. NUMBER: [id number]  DATE OF SERVICE: 23  START TIME: 11:00 AM  END TIME: 11:50 AM  FACILITATOR: Nancy Martins LICSW  TOPIC: MH EBP Group: Coping Skills  [facility] Health Day Treatment  TRACK: 4A    NUMBER OF PARTICIPANTS: 6    Summary of Group / Topics Discussed:  Coping Skills: Meditation: Patients learned about meditation and explored how and when to utilize it to increase focus, reduce mental health symptoms, decrease physical tension, and improve mental well-being.  Approaches to meditation were presented as a means of increasing self-awareness. The benefits of various meditation practices were discussed, as well as barriers to utilization of this coping strategy.     Patient Session Goals / Objectives:    Understand the purpose and efficacy of using meditation modalities to reduce stress / symptoms.    Review / discuss situations in daily life that cause distress, where establishing a meditation routine or meditating as needed may improve functioning.      Verbalize understanding of how and when to apply grounding strategies to reduce distress and increase presence in the moment.    Practice meditation and address barriers to use in daily life.        Patient Participation / Response:  Fully participated with the group by sharing personal reflections / insights and openly received / provided feedback with other participants.    Demonstrated understanding of topics discussed through group discussion and participation and Expressed understanding of the relevance / importance of coping skills at distressing times in life    Treatment Plan:  Patient has a current master individualized treatment plan.  See Epic treatment plan for more information.    RAMIREZ Rosas

## 2023-06-07 NOTE — GROUP NOTE
Psychotherapy Group Note    PATIENT'S NAME: Nancy Montemayor  MRN:   [id number]  :   1960  ACCT. NUMBER: [id number]  DATE OF SERVICE: 23  START TIME: 10:00 AM  END TIME: 10:50 AM  FACILITATOR: Domenica Mckeon LGSW  TOPIC: MH EBP Group: Coping Skills  [facility] Adult [facility] Health Day Treatment  TRACK: 4A    NUMBER OF PARTICIPANTS: 6    Summary of Group / Topics Discussed:  Coping Skills: Building Positive Emotions: Patients discussed the importance of planning and engaging in positive experiences, as strategies to increase positive thinking, hope, and self-worth.  Explored the benefits of planning / creating positive experiences, including recognizing and reducing negativity bias by focusing on and building positive experiences.   Several approaches to building positive experiences were presented and discussed relevant to each patient.      Patient Session Goals / Objectives:    Understand the purpose of planning / creating / participating / sharing in positive experiences.    Explore patient s experiences related to negative thinking and how it influences activities and moodIdentify current positive events in patient s life.     Set goals to increase a variety of positive experiences.    Address barriers to planning / engaging in positive experiences.        Patient Participation / Response:  Fully participated with the group by sharing personal reflections / insights and openly received / provided feedback with other participants.    Demonstrated understanding of topics discussed through group discussion and participation, Expressed understanding of the relevance / importance of coping skills at distressing times in life and Demonstrated knowledge of when to consider using a variety of coping skills in daily life    Treatment Plan:  Patient has a current master individualized treatment plan.  See Epic treatment plan for more information.    JAYDEN Rao

## 2023-06-09 ENCOUNTER — HOSPITAL ENCOUNTER (OUTPATIENT)
Dept: BEHAVIORAL HEALTH | Facility: CLINIC | Age: 63
Discharge: HOME | End: 2023-06-09
Attending: PSYCHIATRY & NEUROLOGY
Payer: COMMERCIAL

## 2023-06-09 DIAGNOSIS — F33.1 MODERATE EPISODE OF RECURRENT MAJOR DEPRESSIVE DISORDER (H): Primary | ICD-10-CM

## 2023-06-09 PROCEDURE — 90853 GROUP PSYCHOTHERAPY: CPT

## 2023-06-09 NOTE — GROUP NOTE
Psychotherapy Group Note    PATIENT'S NAME: Nancy Montemayor  MRN:   [id number]  :   1960  ACCT. NUMBER: [id number]  DATE OF SERVICE: 23  START TIME: 10:00 AM  END TIME: 10:50 AM  FACILITATOR: Domenica Mckeon LGSW  TOPIC: MH EBP Group: Cognitive Restructuring  [facility] Day Treatment  TRACK: 4A    NUMBER OF PARTICIPANTS: 6    Summary of Group / Topics Discussed:  Cognitive Restructuring: Decisions and Distortions: Patients received an overview of how to identify common cognitive distortions. Patients will explore alternatives to cognitive distortions and practice challenging their negative thought patterns. The goal is to help patients target modify ineffective thought patterns.     Patient Session Goals / Objectives:    Familiarized self with ineffective / unhealthy thoughts and how they develop.      Explored impact of ineffective thoughts / distortions on mood and activity    Formulated new neutral/positive alternatives to challenge less helpful / ineffective thoughts.    Practiced and plan to apply in daily life               Patient Participation / Response:  Fully participated with the group by sharing personal reflections / insights and openly received / provided feedback with other participants.    Demonstrated understanding of topics discussed through group discussion and participation, Expressed understanding of the relationship between behaviors, thoughts, and feelings and Demonstrated knowledge of personal thought patterns and how they impact their mood and behavior.    Treatment Plan:  Patient has a current master individualized treatment plan.  See Epic treatment plan for more information.    JAYDEN Rao

## 2023-06-09 NOTE — GROUP NOTE
Psychotherapy Group Note    PATIENT'S NAME: Nancy Montemayor  MRN:   [id number]  :   1960  ACCT. NUMBER: [id number]  DATE OF SERVICE: 23  START TIME: 11:00 AM  END TIME: 11:50 AM  FACILITATOR: Anisha Jonas LICSW  TOPIC: MH EBP Group: Coping Skills  [facility] Day Treatment  TRACK: 4A    NUMBER OF PARTICIPANTS: 5    Summary of Group / Topics Discussed:  Coping Skills: GLADLY GO:  Patients discussed and practiced GLADLY GO, a way to build resilience  Reviewed the benefits of applying the aforementioned coping strategies.  Patients explored how these strategies might be applied to daily stressors or distressing situations.    Patient Session Goals / Objectives:    Understand the purpose and benefits of applying GLADLY GO coping strategies    Identified accomplishments from the past week    Identified positives from the past week    Address barriers to utilizing coping skills when in distress.        Patient Participation / Response:  Fully participated with the group by sharing personal reflections / insights and openly received / provided feedback with other participants.    Demonstrated understanding of topics discussed through group discussion and participation and Expressed understanding of the relevance / importance of coping skills at distressing times in life    Treatment Plan:  Patient has a current master individualized treatment plan.  See Epic treatment plan for more information.    RAMIREZ Dubois

## 2023-06-09 NOTE — GROUP NOTE
Process Group Note    PATIENT'S NAME: Nancy Montemayor  MRN:   [id number]  :   1960  ACCT. NUMBER: [id number]  DATE OF SERVICE: 23  START TIME:  9:00 AM  END TIME:  9:50 AM  FACILITATOR: Domenica Mckeon LGSW  TOPIC:  Process Group    Diagnoses:  296.32 (F33.1) Major Depressive Disorder, Recurrent Episode, Moderate       [facility] Day Treatment  TRACK: 4A    NUMBER OF PARTICIPANTS: 6          Data:    Session content: At the start of this group, patients were invited to check in by identifying themselves, describing their current emotional status, and identifying issues to address in this group.   Area(s) of treatment focus addressed in this session included Symptom Management, Personal Safety and Community Resources/Discharge Planning.  Client reported good is grateful. Client denied safety concerns, including SI and SIB. Client denies any chemical use.  Client is taking medications as prescribed. Client stated she returned items and was proudlysuccessful. Client stated she recognized how high her anxiety was in completing that task. Client shared she used the 5 senses grounding yesterday and plans to keep using it. Client's goal is to take time to relax. Client will apply the following skills: 5 senses for grounding.  Client is proud and grateful for her friends and her therapy group. Peers offered supportive feedback and validation.      Therapeutic Interventions/Treatment Strategies:  Psychotherapist offered support, feedback and validation. Treatment modalities used include Cognitive Behavioral Therapy. Interventions include Mindfulness: 5 senses to ground.    Assessment:    Patient response:   Patient responded to session by accepting feedback, giving feedback, listening and focusing on goals    Possible barriers to participation / learning include: and no barriers identified    Health Issues:   None reported       Substance Use Review:   Substance Use: No active  concerns identified.    Mental Status/Behavioral Observations  Appearance:   Appropriate   Eye Contact:   Good   Psychomotor Behavior: Normal   Attitude:   Cooperative  Interested Friendly Pleasant  Orientation:   All  Speech   Rate / Production: Normal/ Responsive   Volume:  Normal   Mood:    Anxious   Affect:    Appropriate   Thought Content:   Clear and Safety denies any current safety concerns including suicidal ideation, self-harm, and homicidal ideation  Thought Form:  Coherent  Logical     Insight:    Good     Plan:     Safety Plan: No current safety concerns identified.  Recommended that patient call 911 or go to the local ED should there be a change in any of these risk factors.     Barriers to treatment: None identified    Patient Contracts (see media tab):  None    Substance Use: Not addressed in session     Continue or Discharge: Patient will continue in Adult Day Treatment (ADT)  as planned. Patient is likely to benefit from learning and using skills as they work toward the goals identified in their treatment plan.      JAYDEN Rao  June 9, 2023

## 2023-06-13 ENCOUNTER — HOSPITAL ENCOUNTER (OUTPATIENT)
Dept: BEHAVIORAL HEALTH | Facility: CLINIC | Age: 63
Discharge: HOME | End: 2023-06-13
Attending: PSYCHIATRY & NEUROLOGY
Payer: COMMERCIAL

## 2023-06-13 DIAGNOSIS — F33.1 MODERATE EPISODE OF RECURRENT MAJOR DEPRESSIVE DISORDER (H): Primary | ICD-10-CM

## 2023-06-13 PROCEDURE — 90853 GROUP PSYCHOTHERAPY: CPT

## 2023-06-13 NOTE — GROUP NOTE
Psychotherapy Group Note    PATIENT'S NAME: Nancy Montemayor  MRN:   [id number]  :   1960  ACCT. NUMBER: [id number]  DATE OF SERVICE: 23  START TIME:  9:00 AM  END TIME:  9:50 AM  FACILITATOR: Domenica Mckeon LGSW  TOPIC: MH EBP Group: Specialty Awareness  [facility] Day Treatment  TRACK: 4A    NUMBER OF PARTICIPANTS: 7    Summary of Group / Topics Discussed:  Specialty Topics: Time Management, Patients will assess their current strengths and challenges in using time management skills in all areas of life.  - Identify current skills used  - Identify barrier  - Identify new skills to try      Patient Participation / Response:  {OP MH PROGRESS NOTE PATIENT PARTICIPATION:081247}    {OP MH PROGRESS NOTE PATIENT RESPONSE - SPECIALTY TOPICS:370000}    Treatment Plan:  Patient has {OP MH PROGRAMMATIC TX PLAN STATUS:989381}    JAYDEN Rao

## 2023-06-13 NOTE — GROUP NOTE
"Process Group Note    PATIENT'S NAME: Nancy Montemayor  MRN:   [id number]  :   1960  ACCT. NUMBER: [id number]  DATE OF SERVICE: 23  START TIME:  9:00 AM  END TIME:  9:50 AM  FACILITATOR: Domenica Mckeon LGSW  TOPIC:  Process Group    Diagnoses:  296.32 (F33.1) Major Depressive Disorder, Recurrent Episode, Moderate       [facility]  TRACK: 4A    NUMBER OF PARTICIPANTS: 7          Data:    Session content: At the start of this group, patients were invited to check in by identifying themselves, describing their current emotional status, and identifying issues to address in this group.   Area(s) of treatment focus addressed in this session included Symptom Management, Personal Safety and Community Resources/Discharge Planning.  Client denied safety concerns, including SI and SIB. Client denies any chemical use.  Client is taking medications as prescribed. Client reported feeling hopeful and proud and stated she is considering going back to work 30 hours a week in order to have health insurance. Client's goal is to take care of emotional and physical needs. Client will apply the following skills: mindfulness.  Client is grateful for \"group getting me through a lot. I love group therapy and look forward to it.\" Peers offered supportive feedback and validation.      Therapeutic Interventions/Treatment Strategies:  Treatment modalities used include Cognitive Behavioral Therapy.    Assessment:    Patient response:   Patient responded to session by accepting feedback, giving feedback, listening and focusing on goals    Possible barriers to participation / learning include: and no barriers identified    Health Issues:   None reported       Substance Use Review:   Substance Use: No active concerns identified.    Mental Status/Behavioral Observations  Appearance:   Appropriate   Eye Contact:   Good   Psychomotor Behavior: Normal   Attitude:   Cooperative  Interested Friendly " Pleasant  Orientation:   All  Speech   Rate / Production: Normal/ Responsive   Volume:  Normal   Mood:    Anxious   Affect:    Appropriate   Thought Content:   Clear and Safety denies any current safety concerns including suicidal ideation, self-harm, and homicidal ideation  Thought Form:  Coherent  Logical     Insight:    Good     Plan:     Safety Plan: No current safety concerns identified.  Recommended that patient call 911 or go to the local ED should there be a change in any of these risk factors.     Barriers to treatment: None identified    Patient Contracts (see media tab):  None    Substance Use: Not addressed in session     Continue or Discharge: Patient is being discharged today. See Treatment Plan and Discharge Summary.       JAYDEN Rao  June 13, 2023

## 2023-06-13 NOTE — GROUP NOTE
Psychotherapy Group Note    PATIENT'S NAME: Nancy Montemayor  MRN:   [id number]  :   1960  ACCT. NUMBER: [id number]  DATE OF SERVICE: 23  START TIME: 10:00 AM  END TIME: 10:50 AM  FACILITATOR: Domenica Mckeon LGSW  TOPIC: MH EBP Group: Behavioral Activation  [facility] Day Treatment  TRACK: 4A    NUMBER OF PARTICIPANTS: 7    Summary of Group / Topics Discussed:  Behavioral Activation: Time/ Activity Scheduling:Patients explored how they currently spend their time, and how specific behaviors impact thoughts and feelings.  The group explored the effect of negative and positive activities on mood states and thought patterns.  Patients identified activities that help to improve mood and thinking patterns, and developed a plan to implement positive activities between sessions.      Patient Session Goals / Objectives:    Identify impact of current behaviors on thoughts and mood    Identify 2-3 behavioral changes that could have a positive impact on thoughts and mood    Prepare to make desired behavioral change: Create a change plan / activity schedule      Patient Participation / Response:  Fully participated with the group by sharing personal reflections / insights and openly received / provided feedback with other participants.    Demonstrated understanding of topics discussed through group discussion and participation, Expressed understanding of the relationship between behaviors, thoughts, and feelings and Shared experiences and challenges with making behavioral changes    Treatment Plan:  Patient has See Epic Treatment Plan - Patient is discharging.    JAYDEN Rao

## 2023-06-13 NOTE — GROUP NOTE
Psychotherapy Group Note    PATIENT'S NAME: Nancy Montemayor  MRN:   [id number]  :   1960  ACCT. NUMBER: [id number]  DATE OF SERVICE: 23  START TIME: 11:00 AM  END TIME: 11:50 AM  FACILITATOR: Anisha Jonas LICSW  TOPIC: MH EBP Group: Emotions Management  [facility] Day Treatment  TRACK: 4A    NUMBER OF PARTICIPANTS: 7    Summary of Group / Topics Discussed:  Emotions Management: Opposite to Emotion: Patients discussed past and present struggles with knowing how to make changes in their lives due to difficult emotional experiences.  Explored desires to experience and feel less anger, sadness, guilt, and fear.  Reviewed the therapeutic skill of opposite action and patients explored opportunities to use their behaviors as a tool to reduce an emotion that they want to change.     Patient Session Goals / Objectives:    Review DBT concepts and focus on patient s experiences of distress and difficult emotional experiences.    Learn how to do the opposite of what an emotion makes us want to do in an effort to decrease an unwanted emotional experience.    Demonstrate understanding of the skill of opposite action by sharing experiences where the technique could be useful in past / present situations.      Patient Participation / Response:  Fully participated with the group by sharing personal reflections / insights and openly received / provided feedback with other participants.    Demonstrated understanding of topics discussed through group discussion and participation and Expressed understanding of the relevance / importance of emotions management skills at distressing times in life    Treatment Plan:  Patient has See Epic Treatment Plan - Patient is discharging.    RAMIREZ Dubois

## 2023-07-12 ENCOUNTER — TELEPHONE (OUTPATIENT)
Dept: BEHAVIORAL HEALTH | Facility: CLINIC | Age: 63
End: 2023-07-12
Payer: COMMERCIAL

## 2023-08-15 ENCOUNTER — HOSPITAL ENCOUNTER (INPATIENT)
Facility: CLINIC | Age: 63
LOS: 4 days | Discharge: PSYCHIATRIC OR SUBSTANCE USE FACILITY | End: 2023-08-19
Attending: FAMILY MEDICINE | Admitting: FAMILY MEDICINE
Payer: COMMERCIAL

## 2023-08-15 ENCOUNTER — APPOINTMENT (OUTPATIENT)
Dept: GENERAL RADIOLOGY | Facility: CLINIC | Age: 63
End: 2023-08-15
Attending: EMERGENCY MEDICINE
Payer: COMMERCIAL

## 2023-08-15 DIAGNOSIS — T14.91XA SUICIDE ATTEMPT (H): ICD-10-CM

## 2023-08-15 DIAGNOSIS — E87.6 HYPOKALEMIA: ICD-10-CM

## 2023-08-15 DIAGNOSIS — T50.902A POLYSUBSTANCE OVERDOSE, INTENTIONAL SELF-HARM, INITIAL ENCOUNTER (H): ICD-10-CM

## 2023-08-15 DIAGNOSIS — J96.01 ACUTE RESPIRATORY FAILURE WITH HYPOXIA (H): ICD-10-CM

## 2023-08-15 DIAGNOSIS — N17.9 ACUTE KIDNEY INJURY (H): ICD-10-CM

## 2023-08-15 PROBLEM — F33.9 RECURRENT MAJOR DEPRESSION (H): Status: ACTIVE | Noted: 2023-08-15

## 2023-08-15 LAB
ALBUMIN SERPL BCG-MCNC: 3.7 G/DL (ref 3.5–5.2)
ALBUMIN SERPL BCG-MCNC: 3.9 G/DL (ref 3.5–5.2)
ALP SERPL-CCNC: 118 U/L (ref 35–104)
ALP SERPL-CCNC: 118 U/L (ref 35–104)
ALT SERPL W P-5'-P-CCNC: 20 U/L (ref 0–50)
ALT SERPL W P-5'-P-CCNC: 22 U/L (ref 0–50)
AMPHETAMINES UR QL SCN: ABNORMAL
ANION GAP SERPL CALCULATED.3IONS-SCNC: 11 MMOL/L (ref 7–15)
ANION GAP SERPL CALCULATED.3IONS-SCNC: 13 MMOL/L (ref 7–15)
APAP SERPL-MCNC: <5 UG/ML (ref 10–30)
AST SERPL W P-5'-P-CCNC: 28 U/L (ref 0–45)
AST SERPL W P-5'-P-CCNC: 28 U/L (ref 0–45)
ATRIAL RATE - MUSE: 87 BPM
BARBITURATES UR QL SCN: ABNORMAL
BASOPHILS # BLD AUTO: 0 10E3/UL (ref 0–0.2)
BASOPHILS NFR BLD AUTO: 0 %
BENZODIAZ UR QL SCN: ABNORMAL
BILIRUB SERPL-MCNC: 0.3 MG/DL
BILIRUB SERPL-MCNC: 0.3 MG/DL
BUN SERPL-MCNC: 18.6 MG/DL (ref 8–23)
BUN SERPL-MCNC: 22.6 MG/DL (ref 8–23)
BZE UR QL SCN: ABNORMAL
CALCIUM SERPL-MCNC: 10.4 MG/DL (ref 8.8–10.2)
CALCIUM SERPL-MCNC: 8.8 MG/DL (ref 8.8–10.2)
CANNABINOIDS UR QL SCN: ABNORMAL
CHLORIDE SERPL-SCNC: 106 MMOL/L (ref 98–107)
CHLORIDE SERPL-SCNC: 107 MMOL/L (ref 98–107)
CREAT SERPL-MCNC: 1.48 MG/DL (ref 0.51–0.95)
CREAT SERPL-MCNC: 1.58 MG/DL (ref 0.51–0.95)
CREAT SERPL-MCNC: 1.58 MG/DL (ref 0.51–0.95)
DEPRECATED HCO3 PLAS-SCNC: 20 MMOL/L (ref 22–29)
DEPRECATED HCO3 PLAS-SCNC: 22 MMOL/L (ref 22–29)
DIASTOLIC BLOOD PRESSURE - MUSE: NORMAL MMHG
EOSINOPHIL # BLD AUTO: 0 10E3/UL (ref 0–0.7)
EOSINOPHIL NFR BLD AUTO: 0 %
ERYTHROCYTE [DISTWIDTH] IN BLOOD BY AUTOMATED COUNT: 12.7 % (ref 10–15)
ETHANOL SERPL-MCNC: <0.01 G/DL
GFR SERPL CREATININE-BSD FRML MDRD: 37 ML/MIN/1.73M2
GFR SERPL CREATININE-BSD FRML MDRD: 37 ML/MIN/1.73M2
GFR SERPL CREATININE-BSD FRML MDRD: 40 ML/MIN/1.73M2
GLUCOSE SERPL-MCNC: 144 MG/DL (ref 70–99)
GLUCOSE SERPL-MCNC: 98 MG/DL (ref 70–99)
HCT VFR BLD AUTO: 38 % (ref 35–47)
HGB BLD-MCNC: 13.2 G/DL (ref 11.7–15.7)
IMM GRANULOCYTES # BLD: 0 10E3/UL
IMM GRANULOCYTES NFR BLD: 0 %
INR PPP: 1.08 (ref 0.85–1.15)
INTERPRETATION ECG - MUSE: NORMAL
LYMPHOCYTES # BLD AUTO: 1.1 10E3/UL (ref 0.8–5.3)
LYMPHOCYTES NFR BLD AUTO: 15 %
MAGNESIUM SERPL-MCNC: 2 MG/DL (ref 1.7–2.3)
MCH RBC QN AUTO: 30.7 PG (ref 26.5–33)
MCHC RBC AUTO-ENTMCNC: 34.7 G/DL (ref 31.5–36.5)
MCV RBC AUTO: 88 FL (ref 78–100)
MONOCYTES # BLD AUTO: 0.4 10E3/UL (ref 0–1.3)
MONOCYTES NFR BLD AUTO: 6 %
NEUTROPHILS # BLD AUTO: 5.7 10E3/UL (ref 1.6–8.3)
NEUTROPHILS NFR BLD AUTO: 79 %
NRBC # BLD AUTO: 0 10E3/UL
NRBC BLD AUTO-RTO: 0 /100
NT-PROBNP SERPL-MCNC: 110 PG/ML (ref 0–900)
OPIATES UR QL SCN: ABNORMAL
P AXIS - MUSE: 42 DEGREES
PLATELET # BLD AUTO: 319 10E3/UL (ref 150–450)
POTASSIUM SERPL-SCNC: 2.9 MMOL/L (ref 3.4–5.3)
POTASSIUM SERPL-SCNC: 3.8 MMOL/L (ref 3.4–5.3)
POTASSIUM SERPL-SCNC: 3.8 MMOL/L (ref 3.4–5.3)
PR INTERVAL - MUSE: 166 MS
PROT SERPL-MCNC: 6.3 G/DL (ref 6.4–8.3)
PROT SERPL-MCNC: 6.6 G/DL (ref 6.4–8.3)
QRS DURATION - MUSE: 92 MS
QT - MUSE: 400 MS
QTC - MUSE: 481 MS
R AXIS - MUSE: -23 DEGREES
RBC # BLD AUTO: 4.3 10E6/UL (ref 3.8–5.2)
SALICYLATES SERPL-MCNC: <0.3 MG/DL
SODIUM SERPL-SCNC: 139 MMOL/L (ref 136–145)
SODIUM SERPL-SCNC: 140 MMOL/L (ref 136–145)
SYSTOLIC BLOOD PRESSURE - MUSE: NORMAL MMHG
T AXIS - MUSE: 13 DEGREES
VENTRICULAR RATE- MUSE: 87 BPM
WBC # BLD AUTO: 7.3 10E3/UL (ref 4–11)

## 2023-08-15 PROCEDURE — 84132 ASSAY OF SERUM POTASSIUM: CPT

## 2023-08-15 PROCEDURE — 82077 ASSAY SPEC XCP UR&BREATH IA: CPT | Performed by: EMERGENCY MEDICINE

## 2023-08-15 PROCEDURE — 36415 COLL VENOUS BLD VENIPUNCTURE: CPT

## 2023-08-15 PROCEDURE — 85025 COMPLETE CBC W/AUTO DIFF WBC: CPT | Performed by: EMERGENCY MEDICINE

## 2023-08-15 PROCEDURE — 99291 CRITICAL CARE FIRST HOUR: CPT | Mod: 25 | Performed by: EMERGENCY MEDICINE

## 2023-08-15 PROCEDURE — 83880 ASSAY OF NATRIURETIC PEPTIDE: CPT | Performed by: EMERGENCY MEDICINE

## 2023-08-15 PROCEDURE — 80053 COMPREHEN METABOLIC PANEL: CPT | Performed by: EMERGENCY MEDICINE

## 2023-08-15 PROCEDURE — 258N000003 HC RX IP 258 OP 636

## 2023-08-15 PROCEDURE — 250N000011 HC RX IP 250 OP 636: Mod: JZ | Performed by: EMERGENCY MEDICINE

## 2023-08-15 PROCEDURE — 250N000013 HC RX MED GY IP 250 OP 250 PS 637: Performed by: EMERGENCY MEDICINE

## 2023-08-15 PROCEDURE — 93010 ELECTROCARDIOGRAM REPORT: CPT | Performed by: EMERGENCY MEDICINE

## 2023-08-15 PROCEDURE — 71045 X-RAY EXAM CHEST 1 VIEW: CPT

## 2023-08-15 PROCEDURE — 82565 ASSAY OF CREATININE: CPT

## 2023-08-15 PROCEDURE — 80307 DRUG TEST PRSMV CHEM ANLYZR: CPT | Performed by: FAMILY MEDICINE

## 2023-08-15 PROCEDURE — 80143 DRUG ASSAY ACETAMINOPHEN: CPT | Performed by: EMERGENCY MEDICINE

## 2023-08-15 PROCEDURE — 99223 1ST HOSP IP/OBS HIGH 75: CPT | Mod: AI | Performed by: FAMILY MEDICINE

## 2023-08-15 PROCEDURE — 120N000002 HC R&B MED SURG/OB UMMC

## 2023-08-15 PROCEDURE — 84155 ASSAY OF PROTEIN SERUM: CPT

## 2023-08-15 PROCEDURE — 83735 ASSAY OF MAGNESIUM: CPT | Performed by: EMERGENCY MEDICINE

## 2023-08-15 PROCEDURE — 250N000013 HC RX MED GY IP 250 OP 250 PS 637

## 2023-08-15 PROCEDURE — 93005 ELECTROCARDIOGRAM TRACING: CPT | Performed by: EMERGENCY MEDICINE

## 2023-08-15 PROCEDURE — 90791 PSYCH DIAGNOSTIC EVALUATION: CPT

## 2023-08-15 PROCEDURE — 85610 PROTHROMBIN TIME: CPT | Performed by: EMERGENCY MEDICINE

## 2023-08-15 PROCEDURE — 96374 THER/PROPH/DIAG INJ IV PUSH: CPT | Performed by: EMERGENCY MEDICINE

## 2023-08-15 PROCEDURE — 99292 CRITICAL CARE ADDL 30 MIN: CPT | Performed by: EMERGENCY MEDICINE

## 2023-08-15 PROCEDURE — 36415 COLL VENOUS BLD VENIPUNCTURE: CPT | Performed by: EMERGENCY MEDICINE

## 2023-08-15 PROCEDURE — 80179 DRUG ASSAY SALICYLATE: CPT | Performed by: EMERGENCY MEDICINE

## 2023-08-15 RX ORDER — POTASSIUM CHLORIDE 7.45 MG/ML
10 INJECTION INTRAVENOUS ONCE
Status: COMPLETED | OUTPATIENT
Start: 2023-08-15 | End: 2023-08-15

## 2023-08-15 RX ORDER — DULOXETIN HYDROCHLORIDE 60 MG/1
1 CAPSULE, DELAYED RELEASE ORAL DAILY
Status: ON HOLD | COMMUNITY
Start: 2023-07-01 | End: 2023-08-18

## 2023-08-15 RX ORDER — LORAZEPAM 0.5 MG/1
1 TABLET ORAL EVERY 4 HOURS PRN
Status: DISCONTINUED | OUTPATIENT
Start: 2023-08-15 | End: 2023-08-18

## 2023-08-15 RX ORDER — POTASSIUM CHLORIDE 1.5 G/1.58G
40 POWDER, FOR SOLUTION ORAL ONCE
Status: COMPLETED | OUTPATIENT
Start: 2023-08-15 | End: 2023-08-15

## 2023-08-15 RX ORDER — LORATADINE 10 MG/1
10 TABLET ORAL DAILY
Status: DISCONTINUED | OUTPATIENT
Start: 2023-08-16 | End: 2023-08-19 | Stop reason: HOSPADM

## 2023-08-15 RX ORDER — VITAMIN B COMPLEX
25 TABLET ORAL DAILY
Status: DISCONTINUED | OUTPATIENT
Start: 2023-08-16 | End: 2023-08-19 | Stop reason: HOSPADM

## 2023-08-15 RX ORDER — ARIPIPRAZOLE 2 MG/1
2 TABLET ORAL DAILY
Status: DISCONTINUED | OUTPATIENT
Start: 2023-08-16 | End: 2023-08-19 | Stop reason: HOSPADM

## 2023-08-15 RX ORDER — AMLODIPINE BESYLATE 5 MG/1
5 TABLET ORAL DAILY
Status: DISCONTINUED | OUTPATIENT
Start: 2023-08-16 | End: 2023-08-19 | Stop reason: HOSPADM

## 2023-08-15 RX ORDER — SODIUM CHLORIDE 9 MG/ML
INJECTION, SOLUTION INTRAVENOUS
Status: COMPLETED
Start: 2023-08-15 | End: 2023-08-15

## 2023-08-15 RX ORDER — CALCIUM GLUCONATE 94 MG/ML
3 INJECTION, SOLUTION INTRAVENOUS ONCE
Status: COMPLETED | OUTPATIENT
Start: 2023-08-15 | End: 2023-08-15

## 2023-08-15 RX ORDER — ATORVASTATIN CALCIUM 20 MG/1
20 TABLET, FILM COATED ORAL DAILY
Status: DISCONTINUED | OUTPATIENT
Start: 2023-08-16 | End: 2023-08-19 | Stop reason: HOSPADM

## 2023-08-15 RX ORDER — LORATADINE 10 MG/1
1 TABLET ORAL DAILY
COMMUNITY
Start: 2023-06-30

## 2023-08-15 RX ORDER — LIDOCAINE 40 MG/G
CREAM TOPICAL
Status: DISCONTINUED | OUTPATIENT
Start: 2023-08-15 | End: 2023-08-19 | Stop reason: HOSPADM

## 2023-08-15 RX ORDER — DULOXETIN HYDROCHLORIDE 60 MG/1
60 CAPSULE, DELAYED RELEASE ORAL DAILY
Status: DISCONTINUED | OUTPATIENT
Start: 2023-08-16 | End: 2023-08-18

## 2023-08-15 RX ORDER — SODIUM CHLORIDE, SODIUM LACTATE, POTASSIUM CHLORIDE, CALCIUM CHLORIDE 600; 310; 30; 20 MG/100ML; MG/100ML; MG/100ML; MG/100ML
INJECTION, SOLUTION INTRAVENOUS CONTINUOUS
Status: DISPENSED | OUTPATIENT
Start: 2023-08-15 | End: 2023-08-16

## 2023-08-15 RX ORDER — ALPRAZOLAM 0.25 MG
0.25 TABLET ORAL
Status: DISCONTINUED | OUTPATIENT
Start: 2023-08-15 | End: 2023-08-18

## 2023-08-15 RX ADMIN — LORAZEPAM 1 MG: 0.5 TABLET ORAL at 22:39

## 2023-08-15 RX ADMIN — POTASSIUM CHLORIDE 40 MEQ: 1.5 POWDER, FOR SOLUTION ORAL at 17:51

## 2023-08-15 RX ADMIN — CALCIUM GLUCONATE 3 G: 98 INJECTION, SOLUTION INTRAVENOUS at 16:38

## 2023-08-15 RX ADMIN — POTASSIUM CHLORIDE 10 MEQ: 7.46 INJECTION, SOLUTION INTRAVENOUS at 17:51

## 2023-08-15 RX ADMIN — SODIUM CHLORIDE, POTASSIUM CHLORIDE, SODIUM LACTATE AND CALCIUM CHLORIDE: 600; 310; 30; 20 INJECTION, SOLUTION INTRAVENOUS at 22:33

## 2023-08-15 RX ADMIN — SODIUM CHLORIDE 500 ML: 9 INJECTION, SOLUTION INTRAVENOUS at 16:38

## 2023-08-15 RX ADMIN — Medication 500 ML: at 16:38

## 2023-08-15 ASSESSMENT — ACTIVITIES OF DAILY LIVING (ADL)
TRANSFERRING: 0-->INDEPENDENT
ADLS_ACUITY_SCORE: 35
WALKING_OR_CLIMBING_STAIRS_DIFFICULTY: YES
DOING_ERRANDS_INDEPENDENTLY_DIFFICULTY: NO
DRESSING/BATHING_DIFFICULTY: NO
CONCENTRATING,_REMEMBERING_OR_MAKING_DECISIONS_DIFFICULTY: YES
ADLS_ACUITY_SCORE: 35
NUMBER_OF_TIMES_PATIENT_HAS_FALLEN_WITHIN_LAST_SIX_MONTHS: 3
WEAR_GLASSES_OR_BLIND: YES
ADLS_ACUITY_SCORE: 21
WALKING_OR_CLIMBING_STAIRS: OTHER (SEE COMMENTS)
FALL_HISTORY_WITHIN_LAST_SIX_MONTHS: YES
TOILETING_ISSUES: NO
ADLS_ACUITY_SCORE: 21
ADLS_ACUITY_SCORE: 35
DIFFICULTY_EATING/SWALLOWING: NO
CHANGE_IN_FUNCTIONAL_STATUS_SINCE_ONSET_OF_CURRENT_ILLNESS/INJURY: NO
VISION_MANAGEMENT: GLASSES
TRANSFERRING: 0-->INDEPENDENT

## 2023-08-15 NOTE — ED PROVIDER NOTES
ED Provider Note  [facility]      History     Chief Complaint   Patient presents with    Suicidal      came to \[facility]\"" today, found mutiple Suicude note from client. she notified sister, sister called 911. BIB EMS, Patient took Norvasc,flexeril,oxycodone,duloxetine,buspar,benadryl, nyquil and 1 vodka. Approx 30 pills today and yesterday. Poision control case started by medic.      HPI    Nancy Montemayor is a 62 year old female who presents to the emergency department today after overdosing in a deliberate suicide attempt.  It is quite difficult to get a clear and accurate history from the patient.  Patient notes that she has a long history of depression and fibromyalgia, and over the past few weeks she has felt like her symptoms have gotten worse.  She is quite despondent because she is unemployed, has chronic pain due to fibromyalgia, and feels like she cannot go on.  Approximately 2 weeks ago she began to plan her death.  She plans to overdose.  She has a cleaning lady who comes every 2 weeks and today was the day that her cleaning lady was supposed to arrive.  The patient left suicide notes and a note instructing her cleaning lady to text her older sister in case of emergency.    The patient took multiple substances in an overdose attempt in a deliberate attempt to end her life.  She is fairly vague on exactly how much of what medications she took.  She states that she took a bag of leftover medications and put pills in a Heaven cup and took them.  This started on Saturday night, 2 nights ago, where she drank a bottle of wine and also took 2 oxycodone 5 mg tablets, and two 5 mg cyclobenzaprine tablets.  The following morning she woke up and found that she had not , she then took multiple medications that evening, yesterday evening around 7:30 PM.  She estimates that she took at least 15 tablets of amlodipine, 5 mg tablets.  She also thinks that she took at least 10  tablets of BuSpar, 10 mg tablets.  Over the course of the evening she noted that she had a very dry mouth and was very dizzy, she did try to get up to get some water and did fall onto her knees.  She did not hit her head.  She notes that she had to grab the walls on the furniture to help her balance because she was so unsteady.    The patient woke up today and around 10 AM took more amlodipine, she estimates approximately 15 tablets of 5 mg.  She also has an empty box of Benadryl liquid gels, 25 mg tablets.  Patient states that she took 2 of these last night.  Additionally she has a bottle of NyQuil which does contain acetaminophen, doxylamine, and dextromethorphan.  Patient states that she took 2 doses of NyQuil at 1 time last evening with her other pills.  Additionally she reports that she took duloxetine DR 60 mg tablets, she is not certain how many tablets she took last night.    The following bottles accompany her here today:    Oxycodone 5 mg tablets, 10 tablets dispensed on 7/15/2020, bottle is empty    Buspirone 10 mg tablets 40 tablets dispensed on 5/ 16/23, bottle is empty    Duloxetine DR 60 mg capsules, 90 tablets dispensed on 7/1/2023, bottle is empty    Cyclobenzaprine 5 mg tablets, 20 tablets dispensed on 1/23/2019, bottle is empty    Amlodipine 5 mg tablets, 90 tablets dispensed on 7/1/2023 28 tablets remaining    NyQuil liquid, 12 fluid ounces and bottle, bottle is approximately one third full    Diphenhydramine 25 mg Liqui-Gels, 24 capsules in box, box is empty    Past Medical History:   Diagnosis Date    Depressive disorder     Migraines        No past surgical history on file.    No family history on file.    Social History     Tobacco Use    Smoking status: Former    Smokeless tobacco: Never   Substance Use Topics    Alcohol use: Yes     Comment: 1-2 glasses of wine at night         Past Medical History  Past Medical History:   Diagnosis Date    Depressive disorder     Migraines      No past  "surgical history on file.  No current outpatient medications on file.    No Known Allergies  Family History  No family history on file.  Social History   Social History     Tobacco Use    Smoking status: Former    Smokeless tobacco: Never   Substance Use Topics    Alcohol use: Yes     Comment: 1-2 glasses of wine at night    Drug use: Yes     Types: Marijuana         A medically appropriate review of systems was performed with pertinent positives and negatives noted in the HPI, and all other systems negative.    Physical Exam   BP: 98/80  Pulse: 95  Temp: 97.7  F (36.5  C)  Resp: 18  Height: 157.5 cm (5' 2\")  Weight: 83.5 kg (184 lb)  SpO2: 93 %  Physical Exam  Vitals and nursing note reviewed.   Constitutional:       General: She is not in acute distress.     Appearance: She is not diaphoretic.      Comments: Adult female, sitting up in bed, fairly tangential, mentating appropriately otherwise   HENT:      Head: Atraumatic.      Mouth/Throat:      Pharynx: No oropharyngeal exudate.      Comments: Dry mouth  Eyes:      General: No scleral icterus.     Pupils: Pupils are equal, round, and reactive to light.   Cardiovascular:      Rate and Rhythm: Normal rate.      Pulses: Normal pulses.      Heart sounds: Normal heart sounds. No murmur heard.  Pulmonary:      Effort: No respiratory distress.      Comments: Somewhat diminished breath sounds bilaterally, no wheezing, rhonchi, or crackles appreciated  Abdominal:      General: Bowel sounds are normal.      Palpations: Abdomen is soft.      Tenderness: There is no abdominal tenderness. There is no guarding.      Comments: Obese, soft, nontender to palpation   Musculoskeletal:         General: No tenderness.   Skin:     General: Skin is warm.      Findings: No rash.   Neurological:      Mental Status: She is alert.      Comments: Tremor noted of both hands bilaterally   Psychiatric:      Comments: Disheveled appearance.  Calm and cooperative, tangential.  SI with plan.  No " sign of psychosis.           ED Course, Procedures, & Data           EKG Interpretation:      Interpreted by Lisset Kiser MD  Time reviewed:1600   Symptoms at time of EKG: None   Rhythm: Normal sinus   Rate: Normal  Axis: Left Axis Deviation  Ectopy: None  Conduction: QTc 481 ms  ST Segments/ T Waves: No ST-T wave changes and No acute ischemic changes  Q Waves: None  Comparison to prior: No old EKG available    Clinical Impression: NSR, prolonged QTc at 481 ms      Procedures       Mental Health Risk Assessment        PSS-3      Date and Time Over the past 2 weeks have you felt down, depressed, or hopeless? Over the past 2 weeks have you had thoughts of killing yourself? Have you ever attempted to kill yourself? When did this last happen? User   08/15/23 1453 yes yes yes within the last 24 hours (including today) LM          C-SSRS ([place])      Date and Time Q1 Wished to be Dead (Past Month) Q2 Suicidal Thoughts (Past Month) Q3 Suicidal Thought Method Q4 Suicidal Intent without Specific Plan Q5 Suicide Intent with Specific Plan Q6 Suicide Behavior (Lifetime) Within the Past 3 Months? RETIRED: Level of Risk per Screen Screening Not Complete User   08/15/23 1612 -- -- -- -- -- yes -- -- -- SMP   08/15/23 1453 yes yes yes -- yes no -- -- -- LM   08/15/23 1453 -- -- -- yes -- -- -- -- -- SMP              Suicide assessment completed by mental health (D.E.C., LCSW, etc.)       Results for orders placed or performed during the hospital encounter of 08/15/23   XR Chest Port 1 View     Status: None    Narrative    XR CHEST PORT 1 VIEW 8/15/2023 4:21 PM    HISTORY: hypoxia    COMPARISON: None.      Impression    IMPRESSION: No infiltrate, pleural effusion or pneumothorax. The  cardiac and mediastinal silhouettes are normal.    DON BENNETT MD         SYSTEM ID:  [id number]   Comprehensive metabolic panel     Status: Abnormal   Result Value Ref Range    Sodium 139 136 - 145 mmol/L    Potassium 2.9 (L) 3.4 - 5.3  mmol/L    Chloride 106 98 - 107 mmol/L    Carbon Dioxide (CO2) 20 (L) 22 - 29 mmol/L    Anion Gap 13 7 - 15 mmol/L    Urea Nitrogen 18.6 8.0 - 23.0 mg/dL    Creatinine 1.48 (H) 0.51 - 0.95 mg/dL    Calcium 8.8 8.8 - 10.2 mg/dL    Glucose 144 (H) 70 - 99 mg/dL    Alkaline Phosphatase 118 (H) 35 - 104 U/L    AST 28 0 - 45 U/L    ALT 20 0 - 50 U/L    Protein Total 6.3 (L) 6.4 - 8.3 g/dL    Albumin 3.7 3.5 - 5.2 g/dL    Bilirubin Total 0.3 <=1.2 mg/dL    GFR Estimate 40 (L) >60 mL/min/1.73m2   Acetaminophen level     Status: Abnormal   Result Value Ref Range    Acetaminophen <5.0 (L) 10.0 - 30.0 ug/mL   Salicylate level     Status: Normal   Result Value Ref Range    Salicylate <0.3   mg/dL   Alcohol level blood     Status: Normal   Result Value Ref Range    Alcohol ethyl <0.01 <=0.01 g/dL   INR     Status: Normal   Result Value Ref Range    INR 1.08 0.85 - 1.15   Magnesium     Status: Normal   Result Value Ref Range    Magnesium 2.0 1.7 - 2.3 mg/dL   CBC with platelets and differential     Status: None   Result Value Ref Range    WBC Count 7.3 4.0 - 11.0 10e3/uL    RBC Count 4.30 3.80 - 5.20 10e6/uL    Hemoglobin 13.2 11.7 - 15.7 g/dL    Hematocrit 38.0 35.0 - 47.0 %    MCV 88 78 - 100 fL    MCH 30.7 26.5 - 33.0 pg    MCHC 34.7 31.5 - 36.5 g/dL    RDW 12.7 10.0 - 15.0 %    Platelet Count 319 150 - 450 10e3/uL    % Neutrophils 79 %    % Lymphocytes 15 %    % Monocytes 6 %    % Eosinophils 0 %    % Basophils 0 %    % Immature Granulocytes 0 %    NRBCs per 100 WBC 0 <1 /100    Absolute Neutrophils 5.7 1.6 - 8.3 10e3/uL    Absolute Lymphocytes 1.1 0.8 - 5.3 10e3/uL    Absolute Monocytes 0.4 0.0 - 1.3 10e3/uL    Absolute Eosinophils 0.0 0.0 - 0.7 10e3/uL    Absolute Basophils 0.0 0.0 - 0.2 10e3/uL    Absolute Immature Granulocytes 0.0 <=0.4 10e3/uL    Absolute NRBCs 0.0 10e3/uL   Nt probnp inpatient     Status: Normal   Result Value Ref Range    N terminal Pro BNP Inpatient 110 0 - 900 pg/mL   Potassium     Status: Normal    Result Value Ref Range    Potassium 3.8 3.4 - 5.3 mmol/L   Creatinine     Status: Abnormal   Result Value Ref Range    Creatinine 1.58 (H) 0.51 - 0.95 mg/dL    GFR Estimate 37 (L) >60 mL/min/1.73m2   Drug abuse screen 1 urine (ED)     Status: Abnormal   Result Value Ref Range    Amphetamines Urine Screen Positive (A) Screen Negative    Barbituates Urine Screen Negative Screen Negative    Benzodiazepine Urine Screen Positive (A) Screen Negative    Cannabinoids Urine Screen Positive (A) Screen Negative    Cocaine Urine Screen Negative Screen Negative    Opiates Urine Screen Negative Screen Negative   Comprehensive metabolic panel     Status: Abnormal   Result Value Ref Range    Sodium 140 136 - 145 mmol/L    Potassium 3.8 3.4 - 5.3 mmol/L    Chloride 107 98 - 107 mmol/L    Carbon Dioxide (CO2) 22 22 - 29 mmol/L    Anion Gap 11 7 - 15 mmol/L    Urea Nitrogen 22.6 8.0 - 23.0 mg/dL    Creatinine 1.58 (H) 0.51 - 0.95 mg/dL    Calcium 10.4 (H) 8.8 - 10.2 mg/dL    Glucose 98 70 - 99 mg/dL    Alkaline Phosphatase 118 (H) 35 - 104 U/L    AST 28 0 - 45 U/L    ALT 22 0 - 50 U/L    Protein Total 6.6 6.4 - 8.3 g/dL    Albumin 3.9 3.5 - 5.2 g/dL    Bilirubin Total 0.3 <=1.2 mg/dL    GFR Estimate 37 (L) >60 mL/min/1.73m2   EKG 12 lead     Status: None   Result Value Ref Range    Systolic Blood Pressure  mmHg    Diastolic Blood Pressure  mmHg    Ventricular Rate 87 BPM    Atrial Rate 87 BPM    AL Interval 166 ms    QRS Duration 92 ms     ms    QTc 481 ms    P Axis 42 degrees    R AXIS -23 degrees    T Axis 13 degrees    Interpretation ECG       Sinus rhythm  Cannot rule out Anterior infarct , age undetermined  Abnormal ECG  Unconfirmed report - interpretation of this ECG is computer generated - see medical record for final interpretation  Confirmed by - EMERGENCY ROOM, PHYSICIAN ([id number]),  MICHAEL CARVER ([id number]) on 8/15/2023 5:32:16 PM     CBC with Platelets & Differential     Status: None    Narrative    The following  orders were created for panel order CBC with Platelets & Differential.  Procedure                               Abnormality         Status                     ---------                               -----------         ------                     CBC with platelets and d...[[id number]]                      Final result                 Please view results for these tests on the individual orders.   Urine Drugs of Abuse Screen     Status: Abnormal    Narrative    The following orders were created for panel order Urine Drugs of Abuse Screen.  Procedure                               Abnormality         Status                     ---------                               -----------         ------                     Drug abuse screen 1 urin...[[id number]]  Abnormal            Final result                 Please view results for these tests on the individual orders.     Medications   ALPRAZolam (XANAX) tablet 0.25 mg ( Oral Held by provider 8/15/23 2013)   amLODIPine (NORVASC) tablet 5 mg ( Oral Automatically Held 8/19/23 0800)   atorvastatin (LIPITOR) tablet 20 mg ( Oral Automatically Held 8/19/23 0800)   estrogen conj-medroxyPROGESTERone (PREMPRO) 0.3-1.5 MG per tablet 1 tablet ( Oral Automatically Held 8/19/23 0800)   Vitamin D3 (CHOLECALCIFEROL) tablet 25 mcg ( Oral Unheld by provider 8/15/23 2225)   ARIPiprazole (ABILIFY) tablet 2 mg ( Oral Automatically Held 8/19/23 0800)   traZODone (DESYREL) half-tab 25-50 mg ( Oral Automatically Held 8/18/23 2200)   DULoxetine (CYMBALTA) DR capsule 60 mg ( Oral Automatically Held 8/19/23 0800)   omeprazole (PriLOSEC) CR capsule 20 mg ( Oral Automatically Held 8/19/23 0800)   loratadine (CLARITIN) tablet 10 mg ( Oral Automatically Held 8/19/23 0800)   lidocaine 1 % 0.1-1 mL (has no administration in time range)   lidocaine (LMX4) cream (has no administration in time range)   sodium chloride (PF) 0.9% PF flush 3 mL (3 mLs Intracatheter $Given 8/15/23 8130)   sodium chloride (PF) 0.9%  PF flush 3 mL (has no administration in time range)   melatonin tablet 1 mg (has no administration in time range)   lactated ringers infusion ( Intravenous $New Bag 8/15/23 2233)   LORazepam (ATIVAN) tablet 1 mg (1 mg Oral $Given 8/15/23 2239)   0.9% sodium chloride BOLUS (500 mLs Intravenous $New Bag 8/15/23 1638)   calcium gluconate 10 % injection 3 g (3 g Intravenous $New Bag 8/15/23 1638)   potassium chloride 10 mEq in 100 mL sterile water infusion (10 mEq Intravenous $New Bag 8/15/23 1751)   potassium chloride (KLOR-CON) Packet 40 mEq (40 mEq Oral $Given 8/15/23 1751)     Labs Ordered and Resulted from Time of ED Arrival to Time of ED Departure   COMPREHENSIVE METABOLIC PANEL - Abnormal       Result Value    Sodium 139      Potassium 2.9 (*)     Chloride 106      Carbon Dioxide (CO2) 20 (*)     Anion Gap 13      Urea Nitrogen 18.6      Creatinine 1.48 (*)     Calcium 8.8      Glucose 144 (*)     Alkaline Phosphatase 118 (*)     AST 28      ALT 20      Protein Total 6.3 (*)     Albumin 3.7      Bilirubin Total 0.3      GFR Estimate 40 (*)    ACETAMINOPHEN LEVEL - Abnormal    Acetaminophen <5.0 (*)    SALICYLATE LEVEL - Normal    Salicylate <0.3     ETHYL ALCOHOL LEVEL - Normal    Alcohol ethyl <0.01     INR - Normal    INR 1.08     MAGNESIUM - Normal    Magnesium 2.0     NT PROBNP INPATIENT - Normal    N terminal Pro BNP Inpatient 110     CBC WITH PLATELETS AND DIFFERENTIAL    WBC Count 7.3      RBC Count 4.30      Hemoglobin 13.2      Hematocrit 38.0      MCV 88      MCH 30.7      MCHC 34.7      RDW 12.7      Platelet Count 319      % Neutrophils 79      % Lymphocytes 15      % Monocytes 6      % Eosinophils 0      % Basophils 0      % Immature Granulocytes 0      NRBCs per 100 WBC 0      Absolute Neutrophils 5.7      Absolute Lymphocytes 1.1      Absolute Monocytes 0.4      Absolute Eosinophils 0.0      Absolute Basophils 0.0      Absolute Immature Granulocytes 0.0      Absolute NRBCs 0.0       XR Chest Port 1  View   Final Result   IMPRESSION: No infiltrate, pleural effusion or pneumothorax. The   cardiac and mediastinal silhouettes are normal.      DON BENNETT MD            SYSTEM ID:  [id number]             Critical care was performed.   Critical Care Addendum  My initial assessment, based on my review of nursing observations, review of vital signs, focused history, physical exam, 12 lead ECG analysis, and discussion with Poison Control and admitting team , established a high suspicion that Nancy Montemayor has  a multidrug overdose , which requires immediate intervention, and therefore she is critically ill.     After the initial assessment, the care team initiated multiple lab tests, initiated IV fluid administration, and initiated medication therapy with calcium gluconate  to provide stabilization care. Due to the critical nature of this patient, I reassessed nursing observations and vital signs multiple times prior to her disposition.     Time also spent performing documentation, reviewing test results, and discussion with consultants.     Critical care time (excluding teaching time and procedures): 60 minutes.     Assessment & Plan    Patient presents to the emergency department today for the above complaints.  On my evaluation she is alert, cooperative, in no distress.  She does have a tremor which she says is new, and also reports that this tends to be a side effect of BuSpar which is why she is no longer taking it.  She is reporting a dry mouth and does report diarrhea last evening.    Initial blood pressure upon arrival in the emergency department is 98/80.  Heart rate is 95.  She is a bit hypoxic with room air oxygen saturations of 92 to 93%.  She reports she is not on oxygen chronically.    Given the fact that the patient had a multi day overdose, it is going to be challenging to ascertain exactly when she is medically clear.  Poison control was contacted with regard to this.  Given the fact that she did take a  calcium channel blocker, they are recommending empiric treatment with 3 g of calcium gluconate which we have ordered.    We did establish IV access and we did draw blood for laboratory analysis.  CBC within normal limits, CMP is remarkable for hypokalemia with a potassium of 2.9, mild acidosis with a bicarb of 20, NIDHI with a creatinine of 1.48, up from 1.1 previously, alkaline phosphatase 118, transaminases within normal limits, magnesium normal at 2.0 INR, alcohol WNL, acetaminophen < 5, salicylate < 0.3, urine tox positive for amphetamines, benzodiazepines, and cannabinoids, EKG shows normal sinus rhythm without any evidence of ectopy or ischemia, QTc slightly prolonged at 481.  We are unable to obtain echocardiogram at this time of day on the [facility] as had been advised by Poison Control.    Patient was monitored here in the emergency department, blood pressure has always been acceptable.  Interestingly, patient does appear to be somewhat hypoxic, between 90 and 93% on room air, requiring 2 L of oxygen to keep sats above 92.  She has no pre-existing lung disease, it is unclear why she would be hypoxic.  Theoretically, if she had suffered significant enough cardiac depression from her calcium channel blocker overdose, she could have gone into heart failure which I think is unlikely.  BMP is within normal limits and chest x-ray was read as clear, I reviewed this myself as well and I certainly see no evidence of pulmonary edema.      Updated Poison Control about the patient.  We will admit the patient to the hospital at this time as poison control notes that she needs to be monitored for a minimum of 12 hours after amlodipine overdose.  Patient will be admitted on a one-to-one sitter.  Discussed with hospitalist.  She is voluntary but certainly holdable if necessary.      I have reviewed the nursing notes. I have reviewed the findings, diagnosis, plan and need for follow up with the patient.    Current  Discharge Medication List          Final diagnoses:   Polysubstance overdose, intentional self-harm, initial encounter (H)   Suicide attempt (H)   Hypokalemia   Acute kidney injury (H)   Acute respiratory failure with hypoxia (H)       Lisset Kiser MD  [facility] EMERGENCY DEPARTMENT  8/15/2023     Lisset Kiser MD  08/16/23 0007

## 2023-08-15 NOTE — ED NOTES
Bed: ED20  Expected date: 8/15/23  Expected time: 1:51 PM  Means of arrival:   Comments:  63 y/o,F, SI, slowly over dosing on home med's. Patient on a hold

## 2023-08-15 NOTE — CONSULTS
Diagnostic Evaluation Consultation  Crisis Assessment    Patient Name: Nancy Montemayor  Age:  62 year old  Legal Sex: female  Gender Identity: female  Pronouns:   Race: White  Ethnicity: Not  or   Language: English      Patient was assessed: In person      Patient location: [facility] EMERGENCY DEPARTMENT                             ED20    Referral Data and Chief Complaint  Nancy Montemayor presents to the ED via EMS. Patient is presenting to the ED for the following concerns: Suicide attempt.   Factors that make the mental health crisis life threatening or complex are:  Patient reports planning her suicide over the past 2 weeks. Patient cancelled her outpatient providers, stockpiled her medication, wrote suicide notes, and overdosed on the following: Oxycodone, Buspirone, Duloxetine, Cyclobenzaprine, Amlodipine, NyQuil, Diphenhydramine, and alcohol. Patient planned to do this on a day her  was scheduled, who arrived and called 911. Patient is pending medical clearance, reports she intentionally overdosed as suicide attmept and no longer wants to live..      Informed Consent and Assessment Methods  Explained the crisis assessment process, including applicable information disclosures and limits to confidentiality, assessed understanding of the process, and obtained consent to proceed with the assessment.  Assessment methods included conducting a formal interview with patient, review of medical records, collaboration with medical staff, and obtaining relevant collateral information from family and community providers when available.  : done     Patient response to interventions: acceptance expressed  Coping skills were attempted to reduce the crisis:  Patient left a note for her  to patient's sister or call 911 upon arrival this morning.     History of the Crisis   Patient reports stressors including chronic pain, estrangement from her daughter, parents in poor health  "for which she is their caretaker, and financial strain from unemployment. Patient reports she does not see a way out. Patient reports she tooks everything she had in an attempt to end her life twice, both 2 days ago and this morning. Patient reports she knew to overdose today because her cleaning lady comes on Tuesdays and could've found her. Ptaient reports she did not expect to still be alive when her cleaning lady arrived today.    Brief Psychosocial History  Family:  Single, Children yes  Support System:  Children, Parent(s)  Employment Status:  unemployed  Source of Income:  none  Financial Environmental Concerns:  unemployed  Current Hobbies:  family functions  Barriers in Personal Life:  mental health concerns    Significant Clinical History  Current Anxiety Symptoms:  anxious  Current Depression/Trauma:  withdrawl/isolation, sense of doom, helplessness, hoplessness, sadness, thoughts of death/suicide, apathy  Current Somatic Symptoms:  somatic symptoms (abdominal pain, headache, tension)  Current Psychosis/Thought Disturbance:     Current Eating Symptoms:     Chemical Use History:  Alcohol:  (Patient denies regular alcoho use but endorses drinking a bottle of wine with her pills 2 nights ago, and a vodka tonic with her pills this morning.)  Last Use:: 08/15/23   Past diagnosis:  Depression  Family history:     Past treatment:  Day Treatment, Individual therapy, Psychiatric Medication Management  Details of most recent treatment:  Patient reports being seen in our emergency department in 3/2023. Patient was referred for day treatment which she completed. Patient was then recommended for medication management and individual therapy but did not follow up. Patient states she \"cancelled because I knew I wanted to die and didn't want to be talked out of it\".       Collateral Information  Is there collateral information: Yes     Collateral information name, relationship, phone number:  Kavita Wooten, sister " [phone]    What happened today: Patient was evaluated in 3/2023, discharged with [facility] which she participated in. Patient was reportedly in a better place at the completion of programming, but went downhill again the past month. Patient is concerned about her financial situation, parents are in nursing care, isolating herself and no longer responding to family. Patient had a family event with her ex- 2 weeks ago. Patient made a comment to him about if anything happens to her, he should take care of the children. Patient and him had a bad marriage and nasty divorce. Patient's ex- expressed concern but patient denied any symptoms. Patient's sister received a text from patient's cleaning lady an apology for completing suicide, as patient expected to be dead. Patient was awake but altered after consuming numerous substances. Patient had every detail of her life laid out with instructions, outstanding bills, phone number logs, DNR, letters to her children, apologies, and explanations. Patient endorsed multiple ingestions over the past few days but her timeline is confused. Patient said she had planned this for the past 2 weeks.     Has patient made comments about wanting to kill themselves/others: yes    If d/c is recommended, can they take part in safety/aftercare planning:  no       Risk Assessment  [place] Suicide Severity Rating Scale Full Clinical Version:  Suicidal Ideation  Q1 Wish to be Dead (Lifetime): Yes  Q2 Non-Specific Active Suicidal Thoughts (Lifetime): Yes  3. Active Suicidal Ideation with any Methods (Not Plan) Without Intent to Act (Lifetime): Yes  Q4 Active Suicidal Ideation with Some Intent to Act, Without Specific Plan (Lifetime): Yes  Q5 Active Suicidal Ideation with Specific Plan and Intent (Lifetime): Yes  Q6 Suicide Behavior (Lifetime): yes     Suicidal Behavior (Lifetime)  Actual Attempt (Lifetime): Yes  Has subject engaged in non-suicidal self-injurious behavior? (Lifetime):  No  Interrupted Attempts (Lifetime): No  Aborted or Self-Interrupted Attempt (Lifetime): No  Preparatory Acts or Behavior (Lifetime): Yes    [place] Suicide Severity Rating Scale Recent:   Suicidal Ideation (Recent)  Q1 Wished to be Dead (Past Month): yes  Q2 Suicidal Thoughts (Past Month): yes  Q3 Suicidal Thought Method: yes  Q4 Suicidal Intent without Specific Plan: yes  Q5 Suicide Intent with Specific Plan: yes  Level of Risk per Screen: high risk  Intensity of Ideation (Recent)  Most Severe Ideation Rating (Past 1 Month): 5  Frequency (Past 1 Month): Many times each day  Duration (Past 1 Month): More than 8 hours/persistent or continuous  Controllability (Past 1 Month): Unable to control thoughts  Deterrents (Past 1 Month): Deterrents definitely did not stop you  Reasons for Ideation (Past 1 Month): Completely to end or stop the pain (You couldn't go on living with the pain or how you were feeling)  Suicidal Behavior (Recent)  Actual Attempt (Past 3 Months): Yes  Has subject engaged in non-suicidal self-injurious behavior? (Past 3 Months): No  Interrupted Attempts (Past 3 Months): No  Aborted or Self-Interrupted Attempt (Past 3 Months): No  Preparatory Acts or Behavior (Past 3 Months): Yes    Environmental or Psychosocial Events: excessive debt, poor finances, unemployment/underemployment, challenging interpersonal relationships, helplessness/hopelessness, other use of prescription medication  Protective Factors: Protective Factors: able to access care without barriers, strong bond to family unit, community support, or employment    Does the patient have thoughts of harming others? Feels Like Hurting Others: no  Previous Attempt to Hurt Others: no  Is the patient engaging in sexually inappropriate behavior?: no    Is the patient engaging in sexually inappropriate behavior?  no        Mental Status Exam   Affect: Appropriate  Appearance: Disheveled (shaking heavily)  Attention Span/Concentration:  Inattentive  Eye Contact: Variable    Fund of Knowledge: Delayed (confused)   Language /Speech Content: Fluent  Language /Speech Volume: Normal  Language /Speech Rate/Productions: Slow  Recent Memory: Variable  Remote Memory: Intact  Mood: Anxious  Orientation to Person: Yes   Orientation to Place: Yes  Orientation to Time of Day: Yes  Orientation to Date: Yes     Situation (Do they understand why they are here?): Yes  Psychomotor Behavior: Agitated  Thought Content: Suicidal  Thought Form: Intact     Mini-Cog Assessment  Number of Words Recalled:    Clock-Drawing Test:     Three Item Recall:    Mini-Cog Total Score:       Medication  Psychotropic medications:   Medication Orders - Psychiatric (From admission, onward)      None             Current Care Team  Patient Care Team:  Anisha Cosme MD, MD as PCP - General (Internal Medicine)  Ameena Romero MD as Psychiatrist  Cehla Levy APRN CNP as Assigned Behavioral Health Provider    Diagnosis  Patient Active Problem List   Diagnosis Code    Moderate episode of recurrent major depressive disorder (H) F33.1    Recurrent major depression (H) F33.9    Suicide attempt (H) T14.91XA    Polysubstance overdose, intentional self-harm, initial encounter (H) T50.902A       Primary Problem This Admission  Active Hospital Problems    *Recurrent major depression (H)      Suicide attempt (H)      Polysubstance overdose, intentional self-harm, initial encounter (H)        Clinical Summary and Substantiation of Recommendations   Patient identifies stressors including fibromyalgia, caretaking for her parents, unemployment and financial strains, and loneliness. Patient stockpiled her medication over the past 2 weeks and cancelled her outpatient appointments. Patient wrote suicide notes, made arrangements for her belongings, and ingested all of her psychiatric medication combined with NyQuil and alcohol. Patient planned to do this on a Tuesday when her  would be there  to find her . Patient is receiving medical workup and will likely require medical admission. Patient would otherwise benefit from inpatient mental health admission. Patient expressed understanding.       Imminent risk of harm: Suicidal Behavior  Severe psychiatric, behavioral or other comorbid conditions are appropriate for management at inpatient mental health as indicated by at least one of the following: Psychiatric Symptoms, Comorbid substance use disorder  Severe dysfunction in daily living is present as indicated by at least one of the following: Other evidence of severe dysfunction  Situation and expectations are appropriate for inpatient care: Biopsychosocial stresses potentially contributing to clinical presentation (co morbidities) have been assessed and are absent or manageable at proposed level of care  Inpatient mental health services are necessary to meet patient needs and at least one of the following: Specific condition related to admission diagnosis is present and judged likely to deteriorate in absence of treatment at proposed level of care      Patient coping skills attempted to reduce the crisis:  Patient left a note for her  to patient's sister or call 911 upon arrival this morning.    Disposition  Recommended disposition: Medical Admission        Reviewed case and recommendations with attending provider. Attending Name: Dr. Marlon Amezquita (case then transferred to Dr. Lisset Kiser for medical needs)       Attending concurs with disposition: yes       Patient and/or validated legal guardian concurs with disposition:   yes       Final disposition:  medical admission    Legal status on admission: Voluntary/Patient has signed consent for treatment    Assessment Details   Total duration spent on the patient case in minutes: 20 min     CPT code(s) utilized: Non-Billable    RAMIREZ Luis, Psychotherapist  [facility] - Triage & Transition Services  Callback:  [phone]

## 2023-08-15 NOTE — ED NOTES
Per Poison control [place] time for norvasc is 12 hours. Pt took meds at 8 AM.  Norvasc main concern.    1)Give fluids  2)  3 Grams calcium gluconate.   3) Echo at bedside    Cymbalta  Sleepy    Get acetaminophen level for Nyquil. EKG, CMP

## 2023-08-15 NOTE — MEDICATION SCRIBE - ADMISSION MEDICATION HISTORY
Medication Scribe Admission Medication History    Admission medication history is complete. The information provided in this note is only as accurate as the sources available at the time of the update.    Medication reconciliation/reorder completed by provider prior to medication history? No    Information Source(s): Patient and CareEverywhere/SureScripts via in-person    Pertinent Information: N/A    Changes made to PTA medication list:  Added: omeprazole, claritin  Deleted: Wellbutrin 300mg, Wellbutrin 150mg, buspar 5mg  Changed: None    Medication Affordability:       Allergies reviewed with patient and updates made in EHR: yes    Medication History Completed By: Britney Celeste 8/15/2023 5:14 PM    Prior to Admission medications    Medication Sig Last Dose Taking? Auth Provider Long Term End Date   amLODIPine (NORVASC) 5 MG tablet Take 1 tablet by mouth daily 8/15/2023 at am Yes Reported, Patient Yes    atorvastatin (LIPITOR) 20 MG tablet Take 20 mg by mouth Past Week at pm Yes Reported, Patient Yes    DULoxetine (CYMBALTA) 60 MG capsule Take 1 capsule by mouth daily 8/13/2023 at pm Yes Reported, Patient No    loratadine (CLARITIN) 10 MG tablet Take 1 tablet by mouth daily Past Week Yes Reported, Patient     omeprazole (PRILOSEC) 20 MG DR capsule Take 20 mg by mouth daily Before a meal Past Week Yes Reported, Patient     PREMPRO 0.3-1.5 MG tablet Take 1 tablet by mouth daily at 2 pm 8/14/2023 at pm Yes Reported, Patient Yes    ALPRAZolam (XANAX) 0.25 MG tablet Take 0.25 mg by mouth as needed 8/13/2023 at pm  Reported, Patient     ARIPiprazole (ABILIFY) 2 MG tablet Take 2 mg by mouth daily  Patient not taking: Reported on 5/19/2023   Reported, Patient Yes    traZODone (DESYREL) 50 MG tablet Take 25-50 mg by mouth At Bedtime   Reported, Patient Yes    Vitamin D, Cholecalciferol, 25 MCG (1000 UT) CAPS  8/13/2023 at pm  Reported, Patient

## 2023-08-15 NOTE — H&P
[facility]    History and Physical - [facility]       Date of Admission:  8/15/2023    Assessment & Plan      Nancy Montemayor is a 62 year old female who has a history of MDD without previous suicide attempt, fibromyalgia, right nephrectomy (2020) 2/2 kidney cancer, admitted for suicide attempt x2 via overdose. Currently on a 72 hour hold until 8/18.    #Suicide attempt  #Polysubstance overdose (including CCB)  #MDD  2 suicide attempts on 8/13 and 8/14 by overdose. See HPI for detailed report. No previous hx suicide attempts, ED admission in March 2023 where she was discharged with follow up, now s/p 90 day intensive outpatient group therapy, recently following with individual therapist and [facility] psychiatrist. Overall unclear history with difficulty obtaining specific medications and the amount of each taken; patient has reported different information to individual providers. On 8/14 around 9-10 pm, pt reports intent of suicide by taking 30+ pills of home medications mixed together, including amlodipine (estimates 20 x 5mg), buspar (10 x 10mg), duloxetine, xanax, omeprazole, atorvastatin, trazodone, benadryl, THC gummies and 1 vodka tonic. Found by cleaning staff at her house and brought in via EMS, poison control called by ED, now s/p 3 g calcium gluconate for amlodipine toxicity treatment. Negative alcohol, acetaminophen and salicylate levels. Per poison control, the only medications of genuine concern that have likely not yet reached peak concentration and can cause significant side effects are wellbutrin (seizures, tremor, tachycardia) and amlodipine (hypotension, pulmonary edema, seizure, confusion). CXR unremarkable and lung sounds clear on exam. Has significantly worsened tremor in hands from wellbutrin overdose. Vitally stable currently, holding all home medications.  - Poison control recommendations: keep on telemetry at least until  tomorrow morning  - Ativan 1 mg q4h prn for tremor  - Touch base with poison control in AM  - Telemetry and continuous pulse ox  - 1:1 sitter  - 72 hour hold until 8/18 until 1434  - Follow up UDS  - Psychiatry consult  - Vitals q4h  - Repeat CMP tonight (monitor liver enzymes)  - HOLD all PTA meds including amlodipine, aripiprazole, wellbutrin, xanax   - Low threshold to add seizure precautions if clinically worsens    #NIDHI on CKD  #RCC s/p right nephrectomy (07/2020)  Hx of renal cell carcinoma in 2020, found incidentally, curative tx with nephrectomy. Cr baseline in 2020 was around 0.85-0.90 but for past couple years has increased with multiple Cr readings around 1.5. Cr on admission was 1.48. Last Cr in March was 1.1. This is likely NIDHI iso prerenal injury from poor PO intake for several days and significant hypotension 2/2 amlodipine overdose. S/p 500 mL NS bolus in ED.  - Recheck CMP tonight  -  mL/hr x 10 hr  - Daily CMP    #Hypokalemia  #QTc prolongation  S/p 40 mEq replacement. EKG with mild Qtc prolongation at 481.  - Repeat CMP tonight  - Standard K replacement protocol  - Daily CMP  - Telemetry   - Caution with Qtc prolonging medications    #Recent fall  Fell on knees 8/14 evening after overdose. Did not hit head, no LOC. Small bruising on left knee, otherwise no signs of trauma noted on exam.  - Fall precautions for this evening, consider discontinuing once confusion resolved    #Financial distress  Currently unemployed for several months. Patient reports poor finances and fear of losing her house.  - Social work consult    Chronic:  #Fibromyalgia: HOLD PTA cymbalta  #HTN/HLD: HOLD PTA amlodipine and atorvastatin  #Seasonal allergies: HOLD PTA loratadine  #Reflux: HOLD PTA omeprazole  #Vit D deficiency: Continue Vit D supplement  #Insomnia: HOLD PTA trazodone, xanax  #Post-Menopausal HRT: HOLD PTA prempro       Diet: Combination Diet Regular Diet Adult  DVT Prophylaxis: Low Risk/Ambulatory with no  "VTE prophylaxis indicated. PADUA 1  Salas Catheter: Not present  Fluids: LR 100mL/hr x 10 hr, PO  Lines: PIV   Cardiac Monitoring: ACTIVE order. Indication: Drug overdose (24 hours)  Code Status: Full Code    Clinically Significant Risk Factors Present on Admission        # Hypokalemia: Lowest K = 2.9 mmol/L in last 2 days, will replace as needed                # Obesity: Estimated body mass index is 33.76 kg/m  as calculated from the following:    Height as of this encounter: 1.575 m (5' 2\").    Weight as of this encounter: 83.7 kg (184 lb 9.6 oz).         # Financial/Environmental Concerns: unemployed         Disposition Plan      Expected Discharge Date: 2023                The patient's care was discussed with the Attending Physician, Dr. Salazar .      Felix Cisneros MD  [facility]  [facility]  Securely message with [employer] (more info)  Text page via [facility] Paging/Directory   See signed in provider for up to date coverage information  ______________________________________________________________________    Chief Complaint   overdose    History is obtained from the patient    History of Present Illness   Nancy Montemayor is a 62 year old female who has a history of MDD without previous suicide attempt, fibromyalgia, right nephrectomy () 2/2 kidney cancer, admitted for suicide attempt via overdose.    Long hx depression and fibromyalgia, symptoms worse in past 2 weeks. She has multiple stressors including unemployment with poor financial situation (states she will likely lose her house), caretaker for parents, and chronic pain which has led her to feeling suicidal. Started planning her death about 2 weeks ago via overdose and intentionally planned for today because she has a home  who comes every 2 weeks and she planned to be found already  by the . Left suicide notes and note for  to call pt's sister.    On " Sunday 8/13, she drank a bottle of wine, 2 oxycodone (5 mg tablets) and 2 flexeril (5mg tablets). She expected to not wake up from this, but she woke up on Monday morning. On Mon night she estimates taking a collection of 30+ pills; she mixed together all the pills she had in her house in a karina cup and took many. It was difficult to quantify how many and which pills she took, but she estimates she took at least 20 tablets of amlodipine (5 mg tabs), 10 pills buspar (10mg tabs), 1-2 tabs of omeprazole, some amount of atorvastatin, trazodone, benadryl liquid gels (25mg each), duloxetine DR (60mg tabs) and xanax (0.25mg tabs) as well as 3 THC gummies (10mg each). She also reports taking wellbutrin Monday night and reports she has been on this routinely, though this is not reported on her med list. She took all of this with a vodka tonic around 9 or 10 pm on Monday 8/14 evening.    Over the course of the evening, she noted that she had a very dry mouth and was very dizzy, and she had diarrhea all over her house as she tried to get to the bathroom. She did try to get up to get some water and fell onto her knees.  She did not hit her head.  She notes that she had to grab the walls on the furniture to help her balance because she was so unsteady.    Per ED note: The patient woke up today and around 10 AM took more amlodipine, she estimates approximately 15 tablets of 5 mg.  She also has an empty box of Benadryl liquid gels, 25 mg tablets.  Patient states that she took 2 of these last night.  Additionally she has a bottle of NyQuil which does contain acetaminophen, doxylamine, and dextromethorphan.  Patient states that she took 2 doses of NyQuil at 1 time last evening with her other pills.  Additionally she reports that she took duloxetine DR 60 mg tablets, she is not certain how many tablets she took last night    However, when reviewing this with patient several hours later, she reports that she did not take any other  "medications this morning aside from 2 10mg THC gummies.     No history of previous suicide attempts. Did get evaluated for daily passive SI without plan in March 2023, was not admitted for inpatient management and was discharged with plan for 90 days of intensive outpatient 55+ group therapy as well as individual therapy. She reports the group therapy was very helpful but she still was not feeling like her depression was well managed throughout these past months.     Continues to follow with individual therapist - last saw 3 weeks ago because she cancelled her most recent appt one week ago. Last saw psychiatrist ([facility]) about 1 month ago, has not had any recent mental health medication changes even though she reports her symptoms were not well controlled on her current regimen.     Per note from Dr. Anisha Cosme on 6/30/23: Since her last visit with me- pt had a suicidal ideation with plan and was seen in the ED 3/14/23. Now on abilify 2mg, bupropion  and 300, buspar 10 bid, duloxetine 30, prn trazodone.  She is seen in a group for DBT- group therapy     Support includes 10+ strong friends and sister. Her children live in [place]; she is estranged from daughter and talks to her son weekly.     Upon arrival to ED, poison control was called. Anticipate that amlodipine overdose complications would occur within the first 24 hours of ingestion. Started her on telemetry and given 3g of calcium gluconate, as well as 40 mEq KCl given her K+ was 2.9. EKG with sinus rhythm and mild Qtc prolongation of 481. Had unremarkable CXR.    Currently, she has some dizziness, blurry vision, mild shortness of breath. No nausea or vomiting. No recurrence of diarrhea since last night. No urinary changes. Has increased tremor of both hands (chronic for 2 months but significantly worsened today). Patient has continued SI but states \"I guess it's good I'm still here\" and endorses hopefulness about getting help.      Per ED note:    The " following bottles accompany her here today:     Oxycodone 5 mg tablets, 10 tablets dispensed on 7/15/2020, bottle is empty     Buspirone 10 mg tablets 40 tablets dispensed on 5/ 16/23, bottle is empty     Duloxetine DR 60 mg capsules, 90 tablets dispensed on 7/1/2023, bottle is empty     Cyclobenzaprine 5 mg tablets, 20 tablets dispensed on 1/23/2019, bottle is empty     Amlodipine 5 mg tablets, 90 tablets dispensed on 7/1/2023 28 tablets remaining     NyQuil liquid, 12 fluid ounces and bottle, bottle is approximately one third full     Diphenhydramine 25 mg Liqui-Gels, 24 capsules in box, box is empty    Past Medical History    Past Medical History:   Diagnosis Date    Depressive disorder     Migraines    R kidney cancer s/p nephrectomy, no further treatment/chemo/radiation needed.    Past Surgical History   Reports right nephrectomy around 2020 for kidney cancer (stage 1, isolated to single kidney)    Prior to Admission Medications   Prior to Admission Medications   Prescriptions Last Dose Informant Patient Reported? Taking?   ALPRAZolam (XANAX) 0.25 MG tablet 8/13/2023 at pm  Yes No   Sig: Take 0.25 mg by mouth as needed   ARIPiprazole (ABILIFY) 2 MG tablet   Yes No   Sig: Take 2 mg by mouth daily   Patient not taking: Reported on 5/19/2023   DULoxetine (CYMBALTA) 60 MG capsule 8/13/2023 at pm  Yes Yes   Sig: Take 1 capsule by mouth daily   PREMPRO 0.3-1.5 MG tablet 8/14/2023 at pm  Yes Yes   Sig: Take 1 tablet by mouth daily at 2 pm   Vitamin D, Cholecalciferol, 25 MCG (1000 UT) CAPS 8/13/2023 at pm  Yes No   amLODIPine (NORVASC) 5 MG tablet 8/15/2023 at am  Yes Yes   Sig: Take 1 tablet by mouth daily   atorvastatin (LIPITOR) 20 MG tablet Past Week at pm  Yes Yes   Sig: Take 20 mg by mouth   loratadine (CLARITIN) 10 MG tablet Past Week  Yes Yes   Sig: Take 1 tablet by mouth daily   omeprazole (PRILOSEC) 20 MG DR capsule Past Week  Yes Yes   Sig: Take 20 mg by mouth daily Before a meal   traZODone (DESYREL) 50 MG  tablet   Yes No   Sig: Take 25-50 mg by mouth At Bedtime      Facility-Administered Medications: None        Social History   I have reviewed this patient's social history and updated it with pertinent information if needed.  Social History     Tobacco Use    Smoking status: Former    Smokeless tobacco: Never   Substance Use Topics    Alcohol use: Yes     Comment: 1-2 glasses of wine at night    Drug use: Yes     Types: Marijuana     Currently unemployed, worked in retail for 40 years. Has many good friends who are aware of her current situation and hospitalization. 2 children in [place], only has relationship with her son. Her parents are in a nursing home, provides care for them. Close with her sister who lives nearby.     Family History   No family history of mental health conditions in her parents or children. Mother with stroke and father with cardiovascular disease.    Allergies   No Known Allergies     Physical Exam   Vital Signs: Temp: 97.9  F (36.6  C) Temp src: Oral BP: 111/68 Pulse: 78   Resp: 25 SpO2: 95 % O2 Device: None (Room air)    Weight: 184 lbs 9.6 oz    Constitutional: awake, alert, cooperative, no apparent distress, and appears stated age. Engaged in conversation, very forthcoming with information but seems confused about details of recent events  Eyes: Lids and lashes normal, pupils equal and round, sclera clear, conjunctiva normal  ENT: Normocephalic, without obvious abnormality, atraumatic, external ears without lesions, oral pharynx with moist mucous membranes, tonsils without erythema or exudates, gums normal and good dentition.  Respiratory: No increased work of breathing, good air exchange, clear to auscultation bilaterally, no crackles or wheezing  Cardiovascular: Regular rate and rhythm, normal S1 and S2, no S3 or S4, and no murmur noted  GI: Normal bowel sounds, soft, non-distended, non-tender, no masses palpated, no hepatosplenomegaly  Skin: Mild light blue bruising on left  patella, no rashes noted on extremities or face  Musculoskeletal: There is no redness, warmth, or swelling of the joints. Tone is normal. Has pain with palpation of bilateral lower extremities at baseline due to fibromyalgia. No lower extremity pitting edema present  Neurologic: Awake, alert, oriented to name, place, time and situation. Cranial nerves II-XII are grossly intact. Sensory is intact. Answers all questions and engaged in conversation  Neuropsychiatric: General: normal, calm, and normal eye contact  Level of consciousness: alert / normal  Affect: pleasant, slightly flat  Orientation: oriented to self, place, time and situation  Memory and insight: memory for past and recent events is partially intact, unable to remember any specific details for the past couple days and is confused about recent events (timing and quantity of pills taken, challenging history taking). SI present, chronic      Data     I have personally reviewed the following data over the past 24 hrs:    7.3  \   13.2   / 319     139 106 18.6 /  144 (H)   2.9 (L) 20 (L) 1.48 (H) \     ALT: 20 AST: 28 AP: 118 (H) TBILI: 0.3   ALB: 3.7 TOT PROTEIN: 6.3 (L) LIPASE: N/A     Trop: N/A BNP: 110     INR:  1.08 PTT:  N/A   D-dimer:  N/A Fibrinogen:  N/A     Imaging results reviewed over the past 24 hrs:   Recent Results (from the past 24 hour(s))   XR Chest Port 1 View    Narrative    XR CHEST PORT 1 VIEW 8/15/2023 4:21 PM    HISTORY: hypoxia    COMPARISON: None.      Impression    IMPRESSION: No infiltrate, pleural effusion or pneumothorax. The  cardiac and mediastinal silhouettes are normal.    DON BENNETT MD         SYSTEM ID:  [id number]

## 2023-08-15 NOTE — ED TRIAGE NOTES
came to [facility] today, found mutiple Suicude note from client. she notified sister, sister called 911. BIB EMS, Patient took Norvasc,flexeril,oxycodone,duloxetine,buspar,benadryl, nyquil and 1 vodka. Approx 30 pills today and yesterday. Poision control case started by medic.

## 2023-08-16 LAB
ALBUMIN SERPL BCG-MCNC: 3.5 G/DL (ref 3.5–5.2)
ALP SERPL-CCNC: 111 U/L (ref 35–104)
ALT SERPL W P-5'-P-CCNC: 22 U/L (ref 0–50)
ANION GAP SERPL CALCULATED.3IONS-SCNC: 10 MMOL/L (ref 7–15)
AST SERPL W P-5'-P-CCNC: 29 U/L (ref 0–45)
BILIRUB SERPL-MCNC: 0.3 MG/DL
BUN SERPL-MCNC: 15.7 MG/DL (ref 8–23)
CALCIUM SERPL-MCNC: 9.4 MG/DL (ref 8.8–10.2)
CHLORIDE SERPL-SCNC: 108 MMOL/L (ref 98–107)
CREAT SERPL-MCNC: 1.33 MG/DL (ref 0.51–0.95)
DEPRECATED HCO3 PLAS-SCNC: 23 MMOL/L (ref 22–29)
ERYTHROCYTE [DISTWIDTH] IN BLOOD BY AUTOMATED COUNT: 12.9 % (ref 10–15)
GFR SERPL CREATININE-BSD FRML MDRD: 45 ML/MIN/1.73M2
GLUCOSE SERPL-MCNC: 94 MG/DL (ref 70–99)
HCT VFR BLD AUTO: 37.5 % (ref 35–47)
HGB BLD-MCNC: 12.7 G/DL (ref 11.7–15.7)
MCH RBC QN AUTO: 30.1 PG (ref 26.5–33)
MCHC RBC AUTO-ENTMCNC: 33.9 G/DL (ref 31.5–36.5)
MCV RBC AUTO: 89 FL (ref 78–100)
PHOSPHATE SERPL-MCNC: 2.9 MG/DL (ref 2.5–4.5)
PLATELET # BLD AUTO: 297 10E3/UL (ref 150–450)
POTASSIUM SERPL-SCNC: 3.7 MMOL/L (ref 3.4–5.3)
PROT SERPL-MCNC: 6.1 G/DL (ref 6.4–8.3)
RBC # BLD AUTO: 4.22 10E6/UL (ref 3.8–5.2)
SODIUM SERPL-SCNC: 141 MMOL/L (ref 136–145)
WBC # BLD AUTO: 5.5 10E3/UL (ref 4–11)

## 2023-08-16 PROCEDURE — 80053 COMPREHEN METABOLIC PANEL: CPT | Performed by: FAMILY MEDICINE

## 2023-08-16 PROCEDURE — 120N000002 HC R&B MED SURG/OB UMMC

## 2023-08-16 PROCEDURE — 250N000013 HC RX MED GY IP 250 OP 250 PS 637: Performed by: FAMILY MEDICINE

## 2023-08-16 PROCEDURE — 99233 SBSQ HOSP IP/OBS HIGH 50: CPT | Mod: GC | Performed by: FAMILY MEDICINE

## 2023-08-16 PROCEDURE — 250N000013 HC RX MED GY IP 250 OP 250 PS 637

## 2023-08-16 PROCEDURE — 36415 COLL VENOUS BLD VENIPUNCTURE: CPT | Performed by: FAMILY MEDICINE

## 2023-08-16 PROCEDURE — 84100 ASSAY OF PHOSPHORUS: CPT

## 2023-08-16 PROCEDURE — 85014 HEMATOCRIT: CPT | Performed by: FAMILY MEDICINE

## 2023-08-16 PROCEDURE — 99254 IP/OBS CNSLTJ NEW/EST MOD 60: CPT

## 2023-08-16 RX ORDER — LORATADINE 10 MG/1
10 TABLET ORAL DAILY
Status: CANCELLED | OUTPATIENT
Start: 2023-08-17

## 2023-08-16 RX ORDER — ATORVASTATIN CALCIUM 20 MG/1
20 TABLET, FILM COATED ORAL DAILY
Status: CANCELLED | OUTPATIENT
Start: 2023-08-17

## 2023-08-16 RX ORDER — SALIVA STIMULANT COMB. NO.3
2 SPRAY, NON-AEROSOL (ML) MUCOUS MEMBRANE 4 TIMES DAILY PRN
Status: DISCONTINUED | OUTPATIENT
Start: 2023-08-16 | End: 2023-08-19 | Stop reason: HOSPADM

## 2023-08-16 RX ORDER — AMLODIPINE BESYLATE 5 MG/1
5 TABLET ORAL DAILY
Status: CANCELLED | OUTPATIENT
Start: 2023-08-17

## 2023-08-16 RX ADMIN — Medication 25 MCG: at 09:22

## 2023-08-16 RX ADMIN — LORAZEPAM 1 MG: 0.5 TABLET ORAL at 12:33

## 2023-08-16 ASSESSMENT — COLUMBIA-SUICIDE SEVERITY RATING SCALE - C-SSRS
LETHALITY/MEDICAL DAMAGE CODE MOST LETHAL ACTUAL ATTEMPT: MODERATE PHYSICAL DAMAGE, MEDICAL ATTENTION NEEDED
1. SINCE LAST CONTACT, HAVE YOU WISHED YOU WERE DEAD OR WISHED YOU COULD GO TO SLEEP AND NOT WAKE UP?: NO
6. HAVE YOU EVER DONE ANYTHING, STARTED TO DO ANYTHING, OR PREPARED TO DO ANYTHING TO END YOUR LIFE?: NO
2. HAVE YOU ACTUALLY HAD ANY THOUGHTS OF KILLING YOURSELF?: NO
TOTAL  NUMBER OF ABORTED OR SELF INTERRUPTED ATTEMPTS SINCE LAST CONTACT: NO
SUICIDE, SINCE LAST CONTACT: NO
MOST LETHAL DATE: 66701
TOTAL  NUMBER OF INTERRUPTED ATTEMPTS SINCE LAST CONTACT: NO
ATTEMPT SINCE LAST CONTACT: NO

## 2023-08-16 ASSESSMENT — ACTIVITIES OF DAILY LIVING (ADL)
ADLS_ACUITY_SCORE: 21

## 2023-08-16 NOTE — PROGRESS NOTES
"[id number]    BP (!) 141/56 (BP Location: Left arm, Patient Position: Sitting, Cuff Size: Adult Regular)   Pulse 91   Temp 98.2  F (36.8  C) (Oral)   Resp 25   Ht 1.575 m (5' 2\")   Wt 83.7 kg (184 lb 9.6 oz)   SpO2 93%   BMI 33.76 kg/m      A&O x4. Patient able to make needs known and uses call light appropriately. Continent of bowel and bladder. Voids spontaneously without difficulty. Last BM 8/15. Independent in room. Denies pain, chest pain, SOB, n/v. Patient is experiencing tremors which is managed by prn ativan. L PIV saline locked. Regular diet. Continue with plan of care.       "

## 2023-08-16 NOTE — CONSULTS
Triage and Transition - Consult and Liaison     Nancy Montemayor  August 16, 2023    Psychiatry consult acknowledged.    There is no 72 hour hold order in place 8/16/23-SOFIA is not a 72 hour hold and only valid for 12 hours unless seen by a mental health provider before 12 hours. [address].     Will see patient this morning around 0930.     GIUSEPPE MOLINA MSW, [facility]  Triage and Transition - Consult and Liaison   [phone]

## 2023-08-16 NOTE — CONSULTS
SPIRITUAL HEALTH SERVICES Progress Note  [facility] ([facility]) 5B    I introduced myself as a  with Spiritual Health Services and shared that I received a note that said Nancy might appreciate a visit. She did not remember requesting a  and did not need a visit at this time. Her cousin, Poly, was present and visiting as well. Nancy expressed that she is Mosque which is confirmed in her chart notes. I oriented her to Spiritual Health Services and let her know if she would like a  visit at any time while hospitalized she can request a visit by letting the nurse know.     Joshua Barnes MDiv  Chaplain Resident  Pager [phone]      * [facility] remains available 24/7 for emergent requests/referrals, either by having the switchboard page the on-call  or by entering an ASAP/STAT consult in Epic (this will also page the on-call ). Routine Epic consults receive an initial response within 24 hours.*
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
## 2023-08-16 NOTE — CONSULTS
Triage and Transition - Consult and Liaison     Nancy Montemayor  August 16, 2023    Session start: 0930  Session end: 0950  Session duration in minutes: 20 minutes  CPT utilized: 13906 - Psychotherapy (with patient) - 30 (16-37*) min  Patient was seen virtually (AmWell cart or other teleconferencing device).  Anticipated number of sessions or this episode of care: 1-4    Diagnosis:   296.32 (F33.1) Major Depressive Disorder, Recurrent Episode, Moderate _ and With mixed features, by history and present ;    Plan/Recommendations:   Continue care coordination with care team.    Maintain current transition plan.   Pt denies SI, HI, NSSIB, janet or psychosis.  Pt wishes to return to 55+ outpatient program at the [facility] that she has previously participated in and found to be helpful and supportive. Working on referral for this program at this time. More details to come.   Pt is voluntary. 72 hour hold is not necessary.   Pt affect is bright and participative in assessment.   Pt participated in safety planning   Please enter another psychiatry if further visits are needed. Patients are not followed by Psychiatry C&L Service unless otherwise indicated.     Reason for consult: Nancy is 62 year old White  female . Psychiatry consult was requested due to suicide attempt. Patient was seen by [facility] Consult & Liaison team.     Presenting problem: Nancy Montemayor is a 62 year old female who has a history of MDD without previous suicide attempt, fibromyalgia, right nephrectomy (2020) 2/2 kidney cancer, admitted for suicide attempt x2 via overdose. 2 suicide attempts on 8/13 and 8/14 by overdose. See HPI for detailed report. No previous hx suicide attempts, ED admission in March 2023 where she was discharged with follow up, now s/p 90 day intensive outpatient group therapy, recently following with individual therapist and [facility] psychiatrist. Overall unclear history with difficulty obtaining specific medications and the amount of each taken;  "patient has reported different information to individual providers. On 8/14 around 9-10 pm, pt reports intent of suicide by taking 30+ pills of home medications mixed together, including amlodipine (estimates 20 x 5mg), buspar (10 x 10mg), duloxetine, xanax, omeprazole, atorvastatin, trazodone, benadryl, THC gummies and 1 vodka tonic. Found by cleaning staff at her house and brought in via EMS.    Session Summary: Writer met with Ms. Montemayor this morning whom has a bright affect. She notes \" I am sorry that I did what I did and I will never try that again\".  She notes that she has recently had some increased stress her life and she was no longer working with  mental health services as she had.  She reports no access to firearms.  She indicates that if she had access to crisis phone numbers she would reach out to them in the future if thoughts of suicide arose.  She notes she would reach out to her sisters and \"girlfriends\" in the future as well.  She notes she does \"not have a good reason for why I didn't do that this time\".  She denies any symptoms of janet or psychosis now or prior to her attempt.  She feels that remaining in the hospital for IP care would increase mental health decompensation. She reports a strong desire to return to the 55+ program at [facility].  She reports satisfaction with care within this programming and that she found it to be helpful and supportive.  She participates in safety planning. She denies SI, HI or NSSIB.     [place]ads Suicide Severity Rating Scale Since Last Contact:  Suicidal Ideation (Since Last Contact)  1. Wish to be Dead (Since Last Contact): No  2. Non-Specific Active Suicidal Thoughts (Since Last Contact): No  Suicidal Behavior (Since Last Contact)  Actual Attempt (Since Last Contact): No  Has subject engaged in non-suicidal self-injurious behavior? (Since Last Contact): No  Interrupted Attempts (Since Last Contact): No  Aborted or Self-Interrupted Attempt (Since Last Contact): " No  Preparatory Acts or Behavior (Since Last Contact): No  Suicide (Since Last Contact): No  Actual/Potential Lethality (Most Lethal Attempt)  Most Lethal Attempt Date: 08/15/23  Actual Lethality/Medical Damage Code (Most Lethal Attempt): Moderate physical damage, medical attention needed  C-SSRS Risk (Since Last Contact)  Calculated C-SSRS Risk Score (Since Last Contact): No Risk Indicated    Mental Status Exam   Affect: Appropriate  Appearance: Appropriate   Attention Span/Concentration: Attentive    Eye Contact: Variable  Fund of Knowledge: Appropriate   Language /Speech Content: Fluent  Language /Speech Volume: Normal   Language /Speech Rate/Productions: Normal   Recent Memory: Intact  Remote Memory: Intact  Mood: Normal   Orientation:   Person: Yes   Place: Yes  Time of Day: Yes   Date: Yes   Situation (Do they understand why they are here?): Yes   Psychomotor Behavior: Normal   Thought Content: Clear  Thought Form: Intact    Current medications:            Current Facility-Administered Medications   Medication    [Held by provider] ALPRAZolam (XANAX) tablet 0.25 mg    [Held by provider] amLODIPine (NORVASC) tablet 5 mg    [Held by provider] ARIPiprazole (ABILIFY) tablet 2 mg    artificial saliva (BIOTENE MT) solution 2 spray    [Held by provider] atorvastatin (LIPITOR) tablet 20 mg    [Held by provider] DULoxetine (CYMBALTA) DR capsule 60 mg    [Held by provider] estrogen conj-medroxyPROGESTERone (PREMPRO) 0.3-1.5 MG per tablet 1 tablet    lidocaine (LMX4) cream    lidocaine 1 % 0.1-1 mL    [Held by provider] loratadine (CLARITIN) tablet 10 mg    LORazepam (ATIVAN) tablet 1 mg    melatonin tablet 1 mg    [Held by provider] omeprazole (PriLOSEC) CR capsule 20 mg    sodium chloride (PF) 0.9% PF flush 3 mL    sodium chloride (PF) 0.9% PF flush 3 mL    [Held by provider] traZODone (DESYREL) half-tab 25-50 mg    Vitamin D3 (CHOLECALCIFEROL) tablet 25 mcg       Therapeutic intervention and progress:  Therapeutic  intervention consisted of building therapeutic rapport, active listening, engaging in learning/practicing coping skills, active problem solving, crisis management, and safety planning . Patient is making progress towards treatment goals as evidenced by participation in care and assessment.     Collateral information:   Reviewed chart and coordinated with psychiatric medication provider.      GIUSEPPE MOLINA MSW, [facility]  Triage and Transition - Consult and Liaison   [phone]

## 2023-08-16 NOTE — DISCHARGE INSTRUCTIONS
Safety Plan       If I am feeling unsafe or I am in a crisis, I will:   -Contact my established care providers   -Call the National Suicide Prevention Lifeline: 312.910.6510 or 992  -Go to the nearest emergency room   -Call 911      Warning signs that I or other people might notice when a crisis is developing for me:    -Decreased appetite,    -Decreased sleep   -Restlessness   -Increased thoughts about wanting to die   -Increased irritability or agitation.       Things I am able to do on my own to cope or help me feel better:    -take a time-out. Practice yoga, listen to music, meditate, get a massage, or learn relaxation techniques. Stepping back from the problem helps clear your head.   -Eat well-balanced meals. Do not skip any meals. Do keep healthful, energy-boosting snacks on hand.   -Limit alcohol and caffeine   -Get enough sleep. When stressed, your body needs additional sleep and rest.    -Exercise daily to help you feel good and maintain your health.   -Accept that you cannot control everything. Put your stress in perspective: Is it really as bad as you think?    -Welcome humor. A good laugh goes a long way.      Things that I am able to do with others to cope or help me better:    -Listen and talk about concerns   -Continue to follow with outpatient care providers and case management    -Go for a walk   -Distract with going out to eat, to a movie or a park.       Things I can use or do for distraction:    -Watch a TV show. If you don't have cable or a subscription service, many television networks offer free access, without a log-in, until you get closer to the most recent episodes.   -Watch a movie. Light-hearted comedy, drama to suck you in, or an old favorite - there are countless films to whisk you away for a bit.   -Sing. It doesn't matter if you're a professional vocalist or can't to carry a tune, singing engages a completely different part of your brain. Plus, the vibrations in your chest give  great sensory feedback and the vocalization reminds you of your voice.   -Watch cute videos on YouTube. About as low-effort as it gets: puppy/keerthi videos, laugh challenges, or Vine compilations - take your pick.  -Mindless doodles/finger painting/playing with arnulfo. This may be especially helpful to those with child parts (DID/OSDD) who need an activity of their own.   -Grab a snack.   -Drum on a surface. Like singing, the vibrations and bilateral stimulation of your hands thumping will engage different parts of your mind and bring your attention away from what's intruding on you.   -Play a game or use a fun tray on your phone. Even if you aren't a , search the tray store. You might find one that speaks to you. It can be a great escape to get lost in for a bit.   -Video games. Any console, any game!   -Tear out words/photos/etc for a collage. Ask a local doctor's office or hairdresser for their spare magazines. Mindlessly rip out photos and words that speak to you. (Bonus: you may get to put tabloids to good use for once! They often have the scathing, overdramatic words that happen to be great for a therapeutic collages. Shocking! Betrayal! You Won't Believe It!).   -Discover new music. doubleTwist, Quibly, -[place], so many ways to find new gems!   -Wash your face/hands or brush your teeth. A quick refresher can help you restart your day on a brand new page.   -Re-watch highlights from your favorite sport. It's easy to forget just how many epic, captivating moments there were once some time has passed. Relive your excitement. Plus, you already know how it ends, so you don't have to pay super close attention!   -Gratitude list. When your mind only wants to remind you of distressing things, focusing on 10+ things you're grateful for can really take you to a whole new atmosphere in your mind and heart.  -Imagery exercises. Containment exercises, healing pool/healing light, guided meditation, so many options!   -Play a  board game with a friend. Something simple like Sorry!, challenging like chess, or silly like Cards Against Humanity, there are lots of options to distract you in the company of friends.   -Card games. Solo works, too, if there's no one around.      Changes I can make to support my mental health and wellness:      1. Value yourself: Treat yourself with kindness and respect, and avoid self-criticism. Make time for your hobbies and favorite projects, or broaden your horizons. Do a daily crossword puzzle, plant a garden, take dance lessons, learn to play an instrument or become fluent in another language.      2. Take care of your body: Taking care of yourself physically can improve your mental health. Be sure to:   -Eat nutritious meals   -Avoid smoking and vaping-  -Drink plenty of water   -Exercise, which helps decrease depression and anxiety and improve moods   -Get enough sleep. Researchers believe that lack of sleep contributes to a high rate of depression in college students.       3. Surround yourself with good people: People with strong family or social connections are generally healthier than those who lack a support network. Make plans with supportive family members and friends, or seek out activities where you can meet new people, such as a club, class or support group.      4. Give yourself: Volunteer your time and energy to help someone else. You'll feel good about doing something tangible to help someone in need -- and it's a great way to meet new people. See Fun and Cheap Things to do in [place] for ideas.      5. Learn how to deal with stress: Like it or not, stress is a part of life. Practice good coping skills: Try One-Minute Stress Strategies, do Sumeet Chi, exercise, take a nature walk, play with your pet or try journal writing as a stress reducer. Also, remember to smile and see the humor in life. Research shows that laughter can boost your immune system, ease pain, relax your body and reduce  "stress.      6. Quiet your mind: Try meditating, Mindfulness and/or prayer. Relaxation exercises and prayer can improve your state of mind and outlook on life. In fact, research shows that meditation may help you feel calm and enhance the effects of therapy. To get connected, see spiritual resources on Personal Well-being for Students      7. Set realistic goals: Decide what you want to achieve academically, professionally and personally, and write down the steps you need to realize your goals. Aim high, but be realistic and don't over-schedule. You'll enjoy a tremendous sense of accomplishment and self-worth as you progress toward your goal. Wellness Coaching, free to - students, can help you develop goals and stay on track.       8. Break up the monotony: Although our routines make us more efficient and enhance our feelings of security and safety, a little change of pace can perk up a tedious schedule. Alter your jogging route, plan a road-trip, take a walk in a different park, hang some new pictures or try a new restaurant.      9. Avoid alcohol and other drugs: Keep alcohol use to a minimum and avoid other drugs. Sometimes people use alcohol and other drugs to \"self-medicate\" but in reality, alcohol and other drugs only aggravate problems.      10. Get help when you need it: Seeking help is a sign of strength -- not a weakness. And it is important to remember that treatment is effective. People who get appropriate care can recover from mental illness and addiction and lead full, rewarding lives.      People in my life that I can ask for help:   -Sisters  -Friends       Your Pending sale to [facility] has a mental health crisis team you can call 24/7:    Phone: [phone]     Other things that are important when I m in crisis for myself or others to do:     -Keep your voice calm   -Avoid overreacting   -Listen to the person   -Express support and concern   -Avoid continuous eye contact   -Ask how you can help   -Keep stimulation " "level low   -Move slowly   -Offer options instead of trying to take control   -Avoid touching the person unless you ask permission   -Be patient    -Gently announce actions before initiating them    -Give them space, don t make them feel  trapped   -Don t make judgmental comments   -Don t argue or try to reason with the person      Additional resources and information:       National Child[employer] on Mental Illness (KAN) [place]: Connect for help, to navigate the mental health system, and for support and for resources. Call: 5-275-OZUY-Helps / 4-991-862-5003      Crisis Text Line: The 24/7 emergency service is available if you or someone you know is experiencing a psychiatric or mental health crisis. Text: \"[place]\" to 659798      [place] Warmline: Are you an adult needing support? Talk to a specialist who has firsthand experience living with a mental health condition. Call: 626.562.1927. Text: \"Support\" to 47275      National Suicide Prevention Lifeline: The 24/7 lifeline provides support when in distress, has prevention and crisis resources for you or your loved ones, and resources for professionals.  Call 3-524-302-TALK (6802)      Peer Support Connection Warmlines: Pahy-tc-qdjd telephone support that s safe and supportive. Open 5 p.m. to 9 a.m. Call or text: 1-266.770.8453       COVID Cares Stress Phone Support Service. Any [place] experiencing stress can call 966-YNEA7RN (801-639-6787) for free telephone support from 9am to 9pm every day. The service is a collaboration with volunteers from the [place] Psychiatric Society, the [place] Psychological Association, the [place] [employer], and Mental Health [place]. The free service is also accessible at Ecinity where searchers can also find psychiatric and mental health services availability and real-time Substance Use Disorder Treatment program openings.      Mental Health Apps      My3  [email or web address] On Up Graphics.org/      Posh EyeseBox  " [email or web address]/apps/virtual-hope-box/      Additional information  Today you were seen by a licensed mental health professional through Triage and Transition services, [facility] (P)  for a crisis assessment in the Emergency Department at [facility].  It is recommended that you follow up with your established providers (psychiatrist, mental health therapist, and/or primary care doctor - as relevant) as soon as possible. Coordinators from [facility] will be calling you in the next 24-48 hours to ensure that you have the resources you need.  You can also contact [facility] coordinators directly at [phone]. You may have been scheduled for or offered an appointment with a mental health provider. [facility] maintains an extensive network of licensed behavioral health providers to connect patients with the services they need.  We do not charge providers a fee to participate in our referral network.  We match patients with providers based on a patient's specific needs, insurance coverage, and location.  Our first effort will be to refer you to a provider within your care system, and will utilize providers outside your care system as needed.

## 2023-08-16 NOTE — PROGRESS NOTES
[facility]    Progress Note - [facility]       Date of Admission:  8/15/2023    Main plans for today:  - Psychiatry IP consult, follow up reccomendations  - Discuss with poison control   - Added Biotene spray for dry mouth.  - Discontinue telemetry after 24 hours  - Restart her non-psychiatric home meds    Assessment & Plan   Nancy Montemayor is a 62 year old female who has a history of MDD without previous suicide attempt, fibromyalgia, kidney cancer s/p curative right nephrectomy (2020), admitted for suicide attempt x2 via overdose.     #Suicide attempt  #Polysubstance overdose (including CCB)  #MDD  2 suicide attempts on 8/13 and 8/14 by overdose. See H&P for detailed report. No previous hx suicide attempts, ED admission in March 2023 where she was discharged with follow up, now s/p 90 day intensive outpatient group therapy, recently following with individual therapist and [facility] psychiatrist. Overall unclear history with difficulty obtaining specific medications and the amount of each taken; patient has reported different information to individual providers. On 8/14 around 9-10 pm, pt reports intent of suicide by taking 30+ pills of home medications mixed together, including amlodipine (estimates 20 x 5mg), buspar (10 x 10mg), duloxetine, xanax, omeprazole, atorvastatin, trazodone, benadryl, THC gummies and 1 vodka tonic. Found by cleaning staff at her house and brought in via EMS, poison control called by ED, now s/p 3 g calcium gluconate for amlodipine toxicity treatment. Negative alcohol, acetaminophen and salicylate levels. Per poison control, the only medications of concern that have likely not yet reached peak concentration are wellbutrin (seizures, tremor, tachycardia) and amlodipine (hypotension, pulmonary edema, seizure, confusion). CXR unremarkable and lung sounds clear on exam. Has significantly worsened tremor in hands from wellbutrin  overdose. Vitally stable currently, held all home medications initially. On 8/16, CMP is significant for Cr 1.33 and , otherwise unremarkable.  - Poison control recommendations as below.  - Ativan 1 mg q4h prn for tremor  - Telemetry and continuous pulse ox  - Recommend waiting a couple of days before restarting psych medications.  - Given patient stable at >24 hours, signed off.  - 1:1 sitter  - 12 hour hold in place, expiring today  - Follow up UDS  - Psychiatry consult, see note from 8/16 for recommendations.    - Will refer patient to 55+ outpatient program at the [facility] that she has previously participated in.    - Patient is voluntary and thus 72 hour hold is not necessary.  - Vitals q4h  - Low threshold to add seizure precautions if clinically worsens  -  and therapy consult placed  - Biotene spray for dry mouth.  - Start non-psychiatric PTA meds   - Amlodipine 5mg daily   - Atorvastatin 20mg daily   - Loratidine 10mg daily   - Prempro 0.3-1.5mg daily.  - HOLD trazodone, aripiprazole, wellbutrin, xanax    #NIDHI on CKD  #RCC s/p right nephrectomy (07/2020)  Hx of renal cell carcinoma in 2020, found incidentally, curative tx with nephrectomy. Cr baseline in 2020 was around 0.85-0.90 but for past couple years has increased with multiple Cr readings around 1.5. Most recent prior to admission Cr was 1.1 in March, now 1.48 on admission. NIDHI likely prerenal from poor PO intake and significant hypotension 2/2 amlodipine overdose. S/p 500 mL NS bolus in ED and  mL/hr x10 hour on 8/15. Given stable vitals, will discontinue fluids. Encouraged oral fluid intake.   - Daily CMP     #Hypokalemia, resolved  #QTc prolongation  S/p 40 mEq replacement. EKG with mild Qtc prolongation at 481. Mag and Phos normal.  - Standard K replacement protocol  - Daily CMP  - Telemetry for 24 hours, then discontinue  - Caution with Qtc prolonging medications     #Recent fall  Fell on knees 8/14 evening after overdose. Did  "not hit head, no LOC. Small bruising on left knee, otherwise no signs of trauma noted on exam.  - Fall precautions for this evening, consider discontinuing 8/17 AM.     #Financial distress  Currently unemployed for several months. Patient reports poor finances and fear of losing her house.  - Social work consult     Chronic:  #Fibromyalgia: HOLD PTA cymbalta  #HTN/HLD: HOLD PTA amlodipine and atorvastatin  #Seasonal allergies: HOLD PTA loratadine  #Reflux: HOLD PTA omeprazole  #Vit D deficiency: Continue Vit D supplement  #Insomnia: HOLD PTA trazodone, xanax  #Post-Menopausal HRT: HOLD PTA prempro        Diet: Combination Diet Regular Diet Adult    DVT Prophylaxis: Low Risk/Ambulatory with no VTE prophylaxis indicated. PADUA 1  Salas Catheter: Not present  Fluids: LR 100mL/hr x 10 hr, PO  Lines: PIV  Cardiac Monitoring: ACTIVE order. Indication: Drug overdose (24 hours)  Code Status: Full Code      Clinically Significant Risk Factors Present on Admission        # Hypokalemia: Lowest K = 2.9 mmol/L in last 2 days, will replace as needed    # Hypercalcemia: Highest Ca = 10.4 mg/dL in last 2 days, will monitor as appropriate             # Obesity: Estimated body mass index is 33.76 kg/m  as calculated from the following:    Height as of this encounter: 1.575 m (5' 2\").    Weight as of this encounter: 83.7 kg (184 lb 9.6 oz).       # Financial/Environmental Concerns: unemployed         Disposition Plan      Expected Discharge Date: 08/17/2023                The patient's care was discussed with the Attending Physician, Dr. Horacio Salazar .    Aura Gauthier, MS4  Medical Student    Resident/Fellow Attestation   I, Noemi Watts MD, was present with the medical/LOS student who participated in the service and in the documentation of the note.  I have verified the history and personally performed the physical exam and medical decision making.  I agree with the assessment and plan of care as documented in the note.      Marlow " findings: Exam significant for tremor of upper and lower extremities which worsens with finger-nose testing, without focal neuro deficits. This tremor improved throughout the morning. Labs show a new NIDHI. Engaged in treatment, although not exhibiting insight into risk of repeat attempt.      Noemi Watts MD  PGY2  Date of Service (when I saw the patient): 08/16/23    [facility] Medicine Service  [facility]  Securely message with [employer] (more info)  Text page via [facility] Paging/Directory   See signed in provider for up to date coverage information  ______________________________________________________________________    Interval History   Doing well today, just wanting to get rest. Reports tremor is a little bit improved compared to last night, but still more than at baseline. She does have a tremor at baseline when grabbing small objects. Continuing blurry vision, mild dizziness with standing and dry mouth but no SOB, nausea or vomiting.     Physical Exam   Vital Signs: Temp: 98.1  F (36.7  C) Temp src: Oral BP: 105/69 Pulse: 77   Resp: 17 SpO2: 96 % O2 Device: None (Room air)    Weight: 184 lbs 9.6 oz    Constitutional: awake, alert, cooperative, no apparent distress, and appears stated age. Engaged in conversation.  Eyes: Lids and lashes normal, pupils equal, round and reactive to light.  ENT: Normocephalic, without obvious abnormality, atraumatic, external ears without lesions.  Respiratory: No increased work of breathing, good air exchange, clear to auscultation bilaterally, no crackles or wheezing  Cardiovascular: Normal apical impulse, regular rate and rhythm, normal S1 and S2, no S3 or S4, and no murmur noted  Musculoskeletal: There is no redness, warmth, or swelling of the joints. Pain with palpation of the bilateral lower extremities that is at baseline due to Fibromyalgia.   Neurologic: Awake, alert.  Cranial nerves II-XII are intact. Moving all extremities  well and symmetrically. Continuous fine tremor of upper and lower extremities, mild tremor of voice, worsens with finger-nose testing. No myoclonic jerks. Coordination intact.   Neuropsychiatric: Awake and Alert. Normal and pleasant affect.    Medical Decision Making             Data   ------------------------- PAST 24 HR DATA REVIEWED -----------------------------------------------    I have personally reviewed the following data over the past 24 hrs:    5.5  \   12.7   / 297     141 108 (H) 15.7 /  94   3.7 23 1.33 (H) \     ALT: 22 AST: 29 AP: 111 (H) TBILI: 0.3   ALB: 3.5 TOT PROTEIN: 6.1 (L) LIPASE: N/A     Trop: N/A BNP: 110     INR:  1.08 PTT:  N/A   D-dimer:  N/A Fibrinogen:  N/A

## 2023-08-16 NOTE — CONSULTS
Triage and Transition- Consult and Liaison     Nancy JOJO Albina  August 16, 2023      Psychiatry consult completed.     Dictation to follow.     Plan/Recommendations if agreed upon by hospitalist and care team:     -Updated psychiatry medication provider about need for review,   -Pt denies SI, HI, NSSIB, janet or psychosis.  -Pt wishes to return to 55+ outpatient program at the [facility] that she has previously participated in and found to be helpful and supportive. Working on referral for this program at this time. More details to come.   -Pt is voluntary. 72 hour hold is not necessary.   -Pt affect is bright and participative in assessment.   -Pt participated in safety planning (please see below)    Safety Plan      If I am feeling unsafe or I am in a crisis, I will:   -Contact my established care providers   -Call the National Suicide Prevention Lifeline: 504.624.2855 or 566  -Go to the nearest emergency room   -Call [phone]     Warning signs that I or other people might notice when a crisis is developing for me:    -Decreased appetite,    -Decreased sleep   -Restlessness   -Increased thoughts about wanting to die   -Increased irritability or agitation.      Things I am able to do on my own to cope or help me feel better:    -take a time-out. Practice yoga, listen to music, meditate, get a massage, or learn relaxation techniques. Stepping back from the problem helps clear your head.   -Eat well-balanced meals. Do not skip any meals. Do keep healthful, energy-boosting snacks on hand.   -Limit alcohol and caffeine   -Get enough sleep. When stressed, your body needs additional sleep and rest.    -Exercise daily to help you feel good and maintain your health.   -Accept that you cannot control everything. Put your stress in perspective: Is it really as bad as you think?    -Welcome humor. A good laugh goes a long way.     Things that I am able to do with others to cope or help me better:    -Listen and talk about concerns    -Continue to follow with outpatient care providers and case management    -Go for a walk   -Distract with going out to eat, to a movie or a park.      Things I can use or do for distraction:    -Watch a TV show. If you don't have cable or a subscription service, many television networks offer free access, without a log-in, until you get closer to the most recent episodes.   -Watch a movie. Light-hearted comedy, drama to suck you in, or an old favorite - there are countless films to whisk you away for a bit.   -Sing. It doesn't matter if you're a professional vocalist or can't to carry a tune, singing engages a completely different part of your brain. Plus, the vibrations in your chest give great sensory feedback and the vocalization reminds you of your voice.   -Watch cute videos on Cloud[employer]. About as low-effort as it gets: puppy/keerthi videos, laugh challenges, or Vine compilations - take your pick.  -Mindless doodles/finger painting/playing with arnulfo. This may be especially helpful to those with child parts (DID/OSDD) who need an activity of their own.   -Grab a snack.   -Drum on a surface. Like singing, the vibrations and bilateral stimulation of your hands thumping will engage different parts of your mind and bring your attention away from what's intruding on you.   -Play a game or use a fun tray on your phone. Even if you aren't a , search the tray store. You might find one that speaks to you. It can be a great escape to get lost in for a bit.   -Video games. Any console, any game!   -Tear out words/photos/etc for a collage. Ask a local doctor's office or hairdresser for their spare magazines. Mindlessly rip out photos and words that speak to you. (Bonus: you may get to put tabloids to good use for once! They often have the scathing, overdramatic words that happen to be great for a therapeutic collages. Shocking! Betrayal! You Won't Believe It!).   -Discover new music. Cloud.CM, Makepolo.com, -[place], so many  ways to find new gems!   -Wash your face/hands or brush your teeth. A quick refresher can help you restart your day on a brand new page.   -Re-watch highlights from your favorite sport. It's easy to forget just how many epic, captivating moments there were once some time has passed. Relive your excitement. Plus, you already know how it ends, so you don't have to pay super close attention!   -Gratitude list. When your mind only wants to remind you of distressing things, focusing on 10+ things you're grateful for can really take you to a whole new atmosphere in your mind and heart.  -Imagery exercises. Containment exercises, healing pool/healing light, guided meditation, so many options!   -Play a board game with a friend. Something simple like Sorry!, challenging like chess, or silly like Cards Against Humanity, there are lots of options to distract you in the company of friends.   -Card games. Solo works, too, if there's no one around.     Changes I can make to support my mental health and wellness:     1. Value yourself: Treat yourself with kindness and respect, and avoid self-criticism. Make time for your hobbies and favorite projects, or broaden your horizons. Do a daily crossword puzzle, plant a garden, take dance lessons, learn to play an instrument or become fluent in another language.     2. Take care of your body: Taking care of yourself physically can improve your mental health. Be sure to:   -Eat nutritious meals   -Avoid smoking and vaping-  -Drink plenty of water   -Exercise, which helps decrease depression and anxiety and improve moods   -Get enough sleep. Researchers believe that lack of sleep contributes to a high rate of depression in college students.      3. Surround yourself with good people: People with strong family or social connections are generally healthier than those who lack a support network. Make plans with supportive family members and friends, or seek out activities where you can meet  "new people, such as a club, class or support group.     4. Give yourself: Volunteer your time and energy to help someone else. You'll feel good about doing something tangible to help someone in need -- and it's a great way to meet new people. See Fun and Cheap Things to do in [place] for ideas.     5. Learn how to deal with stress: Like it or not, stress is a part of life. Practice good coping skills: Try One-Minute Stress Strategies, do Sumeet Chi, exercise, take a nature walk, play with your pet or try journal writing as a stress reducer. Also, remember to smile and see the humor in life. Research shows that laughter can boost your immune system, ease pain, relax your body and reduce stress.     6. Quiet your mind: Try meditating, Mindfulness and/or prayer. Relaxation exercises and prayer can improve your state of mind and outlook on life. In fact, research shows that meditation may help you feel calm and enhance the effects of therapy. To get connected, see spiritual resources on Personal Well-being for Students     7. Set realistic goals: Decide what you want to achieve academically, professionally and personally, and write down the steps you need to realize your goals. Aim high, but be realistic and don't over-schedule. You'll enjoy a tremendous sense of accomplishment and self-worth as you progress toward your goal. Wellness Coaching, free to - students, can help you develop goals and stay on track.      8. Break up the monotony: Although our routines make us more efficient and enhance our feelings of security and safety, a little change of pace can perk up a tedious schedule. Alter your jogging route, plan a road-trip, take a walk in a different park, hang some new pictures or try a new restaurant.     9. Avoid alcohol and other drugs: Keep alcohol use to a minimum and avoid other drugs. Sometimes people use alcohol and other drugs to \"self-medicate\" but in reality, alcohol and other drugs only aggravate " "problems.     10. Get help when you need it: Seeking help is a sign of strength -- not a weakness. And it is important to remember that treatment is effective. People who get appropriate care can recover from mental illness and addiction and lead full, rewarding lives.     People in my life that I can ask for help:   -Sisters  -Friends      Your [facility] has a mental health crisis team you can call 24/7:    Phone: [phone]    Other things that are important when I m in crisis for myself or others to do:     -Keep your voice calm   -Avoid overreacting   -Listen to the person   -Express support and concern   -Avoid continuous eye contact   -Ask how you can help   -Keep stimulation level low   -Move slowly   -Offer options instead of trying to take control   -Avoid touching the person unless you ask permission   -Be patient    -Gently announce actions before initiating them    -Give them space, don t make them feel  trapped   -Don t make judgmental comments   -Don t argue or try to reason with the person     Additional resources and information:      National [place] on Mental Illness (KAN) [place]: Connect for help, to navigate the mental health system, and for support and for resources. Call: 4-717-RPPT-Helps / 6-310-594-9098     Crisis Text Line: The 24/7 emergency service is available if you or someone you know is experiencing a psychiatric or mental health crisis. Text: \"[place]\" to 076280     [place] Warmline: Are you an adult needing support? Talk to a specialist who has firsthand experience living with a mental health condition. Call: 565.990.6490. Text: \"Support\" to 78335     National Suicide Prevention Lifeline: The 24/7 lifeline provides support when in distress, has prevention and crisis resources for you or your loved ones, and resources for professionals.  Call 7-274-106-TALK (9308)     Peer Support Connection Warmlines: Rwbp-wc-yfss telephone support that s safe and supportive. Open 5 p.m. to 9 a.m. " Call or text: 1-893.683.8525      COVID Cares Stress Phone Support Service. Any [place] experiencing stress can call 188-IGHU2IH (200-845-7880) for free telephone support from 9am to 9pm every day. The service is a collaboration with volunteers from the [place] Psychiatric Society, the [place] Psychological Association, the [place] [employer], and [facility] [place]. The free service is also accessible at Wiren Board where searchers can also find psychiatric and mental health services availability and real-time Substance Use Disorder Treatment program openings.     Mental Health Apps     My3  https://MComms TV.[employer]/     VirtualHopeBox  https://[employer]/apps/virtual-hope-box/     Additional information  Today you were seen by a licensed mental health professional through Triage and Transition services, Behavioral Healthcare Providers ([facility])  for a crisis assessment in the Emergency Department at [facility].  It is recommended that you follow up with your established providers (psychiatrist, mental health therapist, and/or primary care doctor - as relevant) as soon as possible. Coordinators from [facility] will be calling you in the next 24-48 hours to ensure that you have the resources you need.  You can also contact [facility] coordinators directly at [phone]. You may have been scheduled for or offered an appointment with a mental health provider. [facility] maintains an extensive network of licensed behavioral health providers to connect patients with the services they need.  We do not charge providers a fee to participate in our referral network.  We match patients with providers based on a patient's specific needs, insurance coverage, and location.  Our first effort will be to refer you to a provider within your care system, and will utilize providers outside your care system as needed.        GIUSEPPE JONES, Health system  Triage and Transition - Consult and Liaison   [phone]    Urias catheter in place

## 2023-08-17 ENCOUNTER — TELEPHONE (OUTPATIENT)
Dept: BEHAVIORAL HEALTH | Facility: CLINIC | Age: 63
End: 2023-08-17
Payer: COMMERCIAL

## 2023-08-17 LAB
ALBUMIN SERPL BCG-MCNC: 3.5 G/DL (ref 3.5–5.2)
ALP SERPL-CCNC: 103 U/L (ref 35–104)
ALT SERPL W P-5'-P-CCNC: 23 U/L (ref 0–50)
ANION GAP SERPL CALCULATED.3IONS-SCNC: 10 MMOL/L (ref 7–15)
AST SERPL W P-5'-P-CCNC: 26 U/L (ref 0–45)
BILIRUB SERPL-MCNC: 0.2 MG/DL
BUN SERPL-MCNC: 12.7 MG/DL (ref 8–23)
CALCIUM SERPL-MCNC: 9.2 MG/DL (ref 8.8–10.2)
CHLORIDE SERPL-SCNC: 109 MMOL/L (ref 98–107)
CHOLEST SERPL-MCNC: 188 MG/DL
CREAT SERPL-MCNC: 1.19 MG/DL (ref 0.51–0.95)
DEPRECATED HCO3 PLAS-SCNC: 23 MMOL/L (ref 22–29)
ERYTHROCYTE [DISTWIDTH] IN BLOOD BY AUTOMATED COUNT: 12.9 % (ref 10–15)
GFR SERPL CREATININE-BSD FRML MDRD: 51 ML/MIN/1.73M2
GLUCOSE SERPL-MCNC: 88 MG/DL (ref 70–99)
HBA1C MFR BLD: 5.9 %
HCT VFR BLD AUTO: 36.1 % (ref 35–47)
HDLC SERPL-MCNC: 54 MG/DL
HGB BLD-MCNC: 12.3 G/DL (ref 11.7–15.7)
LDLC SERPL CALC-MCNC: 113 MG/DL
MCH RBC QN AUTO: 30.4 PG (ref 26.5–33)
MCHC RBC AUTO-ENTMCNC: 34.1 G/DL (ref 31.5–36.5)
MCV RBC AUTO: 89 FL (ref 78–100)
NONHDLC SERPL-MCNC: 134 MG/DL
PLATELET # BLD AUTO: 294 10E3/UL (ref 150–450)
POTASSIUM SERPL-SCNC: 3.8 MMOL/L (ref 3.4–5.3)
PROT SERPL-MCNC: 5.8 G/DL (ref 6.4–8.3)
RBC # BLD AUTO: 4.05 10E6/UL (ref 3.8–5.2)
SODIUM SERPL-SCNC: 142 MMOL/L (ref 136–145)
TRIGL SERPL-MCNC: 107 MG/DL
TSH SERPL DL<=0.005 MIU/L-ACNC: 1.46 UIU/ML (ref 0.3–4.2)
WBC # BLD AUTO: 5.2 10E3/UL (ref 4–11)

## 2023-08-17 PROCEDURE — 250N000013 HC RX MED GY IP 250 OP 250 PS 637

## 2023-08-17 PROCEDURE — 36415 COLL VENOUS BLD VENIPUNCTURE: CPT | Performed by: FAMILY MEDICINE

## 2023-08-17 PROCEDURE — 84443 ASSAY THYROID STIM HORMONE: CPT | Performed by: STUDENT IN AN ORGANIZED HEALTH CARE EDUCATION/TRAINING PROGRAM

## 2023-08-17 PROCEDURE — 250N000013 HC RX MED GY IP 250 OP 250 PS 637: Performed by: STUDENT IN AN ORGANIZED HEALTH CARE EDUCATION/TRAINING PROGRAM

## 2023-08-17 PROCEDURE — 80053 COMPREHEN METABOLIC PANEL: CPT | Performed by: FAMILY MEDICINE

## 2023-08-17 PROCEDURE — 99233 SBSQ HOSP IP/OBS HIGH 50: CPT

## 2023-08-17 PROCEDURE — 120N000002 HC R&B MED SURG/OB UMMC

## 2023-08-17 PROCEDURE — 85014 HEMATOCRIT: CPT | Performed by: FAMILY MEDICINE

## 2023-08-17 PROCEDURE — 99232 SBSQ HOSP IP/OBS MODERATE 35: CPT | Mod: GC | Performed by: FAMILY MEDICINE

## 2023-08-17 PROCEDURE — 90791 PSYCH DIAGNOSTIC EVALUATION: CPT | Performed by: COUNSELOR

## 2023-08-17 PROCEDURE — 250N000013 HC RX MED GY IP 250 OP 250 PS 637: Performed by: FAMILY MEDICINE

## 2023-08-17 PROCEDURE — 80061 LIPID PANEL: CPT | Performed by: STUDENT IN AN ORGANIZED HEALTH CARE EDUCATION/TRAINING PROGRAM

## 2023-08-17 PROCEDURE — 83036 HEMOGLOBIN GLYCOSYLATED A1C: CPT | Performed by: STUDENT IN AN ORGANIZED HEALTH CARE EDUCATION/TRAINING PROGRAM

## 2023-08-17 RX ORDER — GABAPENTIN 100 MG/1
100 CAPSULE ORAL 2 TIMES DAILY
Status: DISCONTINUED | OUTPATIENT
Start: 2023-08-17 | End: 2023-08-19 | Stop reason: HOSPADM

## 2023-08-17 RX ORDER — LACTOBACILLUS RHAMNOSUS GG 10B CELL
1 CAPSULE ORAL 2 TIMES DAILY
Status: DISCONTINUED | OUTPATIENT
Start: 2023-08-17 | End: 2023-08-19 | Stop reason: HOSPADM

## 2023-08-17 RX ORDER — ACETAMINOPHEN 325 MG/1
650 TABLET ORAL EVERY 6 HOURS PRN
Status: DISCONTINUED | OUTPATIENT
Start: 2023-08-17 | End: 2023-08-19 | Stop reason: HOSPADM

## 2023-08-17 RX ADMIN — ATORVASTATIN CALCIUM 20 MG: 20 TABLET, FILM COATED ORAL at 12:42

## 2023-08-17 RX ADMIN — Medication 1 CAPSULE: at 20:41

## 2023-08-17 RX ADMIN — Medication 25 MCG: at 12:42

## 2023-08-17 RX ADMIN — LORAZEPAM 1 MG: 0.5 TABLET ORAL at 18:00

## 2023-08-17 RX ADMIN — AMLODIPINE BESYLATE 5 MG: 5 TABLET ORAL at 12:42

## 2023-08-17 RX ADMIN — GABAPENTIN 100 MG: 100 CAPSULE ORAL at 20:41

## 2023-08-17 RX ADMIN — OMEPRAZOLE 20 MG: 20 CAPSULE, DELAYED RELEASE ORAL at 12:42

## 2023-08-17 ASSESSMENT — ACTIVITIES OF DAILY LIVING (ADL)
ADLS_ACUITY_SCORE: 21

## 2023-08-17 ASSESSMENT — ANXIETY QUESTIONNAIRES
6. BECOMING EASILY ANNOYED OR IRRITABLE: MORE THAN HALF THE DAYS
3. WORRYING TOO MUCH ABOUT DIFFERENT THINGS: NEARLY EVERY DAY
4. TROUBLE RELAXING: NOT AT ALL
GAD7 TOTAL SCORE: 12
2. NOT BEING ABLE TO STOP OR CONTROL WORRYING: NEARLY EVERY DAY
1. FEELING NERVOUS, ANXIOUS, OR ON EDGE: MORE THAN HALF THE DAYS
GAD7 TOTAL SCORE: 12
5. BEING SO RESTLESS THAT IT IS HARD TO SIT STILL: NOT AT ALL
7. FEELING AFRAID AS IF SOMETHING AWFUL MIGHT HAPPEN: MORE THAN HALF THE DAYS

## 2023-08-17 ASSESSMENT — COLUMBIA-SUICIDE SEVERITY RATING SCALE - C-SSRS
BASED ON RESPONSES TO C-SSRS QS 1-6, WHAT IS THE PATIENT'S OVERALL RISK RATING FOR SUICIDE: HIGH RISK
4. HAVE YOU HAD THESE THOUGHTS AND HAD SOME INTENTION OF ACTING ON THEM?: NO
5. HAVE YOU STARTED TO WORK OUT OR WORKED OUT THE DETAILS OF HOW TO KILL YOURSELF? DO YOU INTEND TO CARRY OUT THIS PLAN?: NO
6. HAVE YOU EVER DONE ANYTHING, STARTED TO DO ANYTHING, OR PREPARED TO DO ANYTHING TO END YOUR LIFE?: YES
3. HAVE YOU BEEN THINKING ABOUT HOW YOU MIGHT KILL YOURSELF?: NO
1. IN THE PAST MONTH, HAVE YOU WISHED YOU WERE DEAD OR WISHED YOU COULD GO TO SLEEP AND NOT WAKE UP?: YES
2. HAVE YOU ACTUALLY HAD ANY THOUGHTS OF KILLING YOURSELF IN THE PAST MONTH?: NO

## 2023-08-17 ASSESSMENT — PATIENT HEALTH QUESTIONNAIRE - PHQ9: SUM OF ALL RESPONSES TO PHQ QUESTIONS 1-9: 20

## 2023-08-17 NOTE — TELEPHONE ENCOUNTER
S:  5 med surg [place] [phone] ,  Iesha NP  calling at 1:53PM about a 62 year old/Female presenting post overdose     B: Pt arrived via EMS after intentional poly drug overdose and vodka. Presenting problem, stressors: Pt has severe depression and anxiety. Attempts occurred 2 nights in a row. Just finished 55+ IOP program in June. Stressors seeing ex, job, finances.    Pt affect in ED: Calm and Cooperative   Pt Dx: Major Depressive Disorder and Generalized Anxiety Disorder  Previous IPMH hx? No  Pt endorses SI, no plan   Hx of suicide attempt? No  Pt denies SIB  Pt denies HI   Pt denies hallucinations .   Pt RARS Score: 3    Hx of aggression/violence, sexual offenses, legal concerns, Epic care plan? describe: No  Current concerns for aggression this visit? No  Does pt have a history of Civil Commitment? No  Is Pt their own guardian? Yes    Pt is prescribed medication. Is patient medication compliant? Yes  Pt endorses OP services: Psychiatrist and Therapist, primary care doc.  CD concerns:  denies drinking regularly  Acute or chronic medical concerns: Fibromyalgia, hx of kidney cancer with only 1 kidney.  Does Pt present with specific needs, assistive devices, or exclusionary criteria? None      Pt is ambulatory  Pt is able to perform ADLs independently      A: Pt to be reviewed for [facility] admission. Pt is Voluntary  Preferred placement: [facility]    COVID Symptoms: No  If yes, COVID test required   Utox: Positive for amphetamines, benzos and THC.    CMP: Abnormalities: creatine 1.19 and chloride 109  CBC: WNL  HCG: N/A    R: Patient cleared and ready for behavioral bed placement: Yes  Pt placed on IP worklist? Yes

## 2023-08-17 NOTE — PROGRESS NOTES
LOCUS Worksheet     Name: Nancy Montemayor MRN: [id number]    : 1960      Gender:  female    PMI:  NA   [facility]   Provider NPI:  [id number]    Actual level of Care Provided:  therapy and psychiatry    Service(s) receiving or referred to:  IOP    Reason for Variance: Due to worsening mental health symptoms, suicidal ideation and decline in functioning.      Rating completed by: August Alatorre, LPCC, LADC      I. Risk of Harm:   3      Moderate Risk of Harm    II. Functional Status:   3      Moderate Impairment    III. Co-Morbidity:   2      Minor Co-Morbidity    IV - A. Recovery Environment - Level of Stress:   3      Moderately Stress Environment    IV - B. Recovery Environment - Level of Support:   2      Supportive Environment    V. Treatment and Recovery History:   3      Moderate to Equivocal Response to Treatment and Recovery Management    VI. Engagement and Recovery Project:   3      Limited Engagement and Recovery       19 Composite Score    Level of Care Recommendation:   17 to 19       High Intensity Community Based Services

## 2023-08-17 NOTE — PROGRESS NOTES
Brief Progress Note regarding bedside conversation with family and friend  8/17/23 - 2:45pm    Discussed with patient that a bed has been requested for inpatient psych. Beds are currently full but we will continue to update her on the status and re-evaluate her on a daily basis.     Some additional hx regarding her suicide attempt. Patient also reports that she spent multiple days prior to the attempt preparing notes and setting aside personal items for family members. She even bought a paint tarp in preparation for her suicide as she did not want her family to have to clean up after her. Addendum (medical student with additional history from interview): patient specifically had looked up contraindicated medications and deliberately took the medications together with alcohol. Multiple times during this preparation, patient canceled plans, answered phone calls from friends, and even had her sister visit in-person, however, patient was always able to mask her mental and emotional state. Friend reports that her group of friends were starting to get a little concerned but did not realize how bad the patient was actually doing. Reassured family and friend that depression can present in different ways and that they were not at fault for this hospitalization. Affect and mood have continued to be incongruent throughout this hospital stay.    Reporting some chronic diarrhea that has worsened with the overdose. She states she started a new medication months ago (unknown which medication) that caused diarrhea that prevented her from leaving the house. She then switched to another medication that then caused her to gain 10 lbs. She either returned back to the original medication or switched to another; she cannot remember exactly. She has had diarrhea intermittently since then and is curious about medication management. Would like to get probiotics if possible.     Also reporting fibromyalgia pain. Order already placed for  gabapentin 100mg BID which the patient has not yet received. Will also add on Tylenol. Discussed with patient that she may need to follow up with her PCP for an actual fibromyalgia pain regimen.     Requested care coordination to discuss finance/insurance needs regarding this hospitalization.    Aura Gauthier, MS4    Medical student discussed this conversation with me. Probiotic ordered. SW consult addended. Tylenol added.   Sudha Gandhi MD

## 2023-08-17 NOTE — PROGRESS NOTES
[facility]    Progress Note - [facility] Medicine Service       Date of Admission:  8/15/2023    Main plans for today:  - Psychiatry IP to re-evaluate patient today.  - Continue her non-psychiatric home meds  - Call outpatient psychiatrist to explore chronic tremors    Assessment & Plan   Nancy Montemayor is a 62 year old female who has a history of MDD without previous suicide attempt, fibromyalgia, kidney cancer s/p curative right nephrectomy (2020), admitted for suicide attempt x2 via overdose.     #Suicide attempt  #Polysubstance overdose (including CCB)  #MDD  #Word finding difficulty  2 suicide attempts on 8/13 and 8/14 by overdose. See H&P for detailed report. No previous hx suicide attempts, ED admission in March 2023 where she was discharged with follow up, now s/p 90 day intensive outpatient group therapy, recently following with individual therapist and [facility] psychiatrist. Overall unclear history with difficulty obtaining specific medications and the amount of each taken; patient has reported different information to individual providers. On 8/14 around 9-10 pm, pt reports intent of suicide by taking 30+ pills of home medications mixed together, including amlodipine (estimates 20 x 5mg), buspar (10 x 10mg), duloxetine, xanax, omeprazole, atorvastatin, trazodone, benadryl, THC gummies and 1 vodka tonic. Found by cleaning staff at her house and brought in via EMS, poison control called by ED, now s/p 3 g calcium gluconate for amlodipine toxicity treatment. Negative alcohol, acetaminophen and salicylate levels. Per poison control, the only medications of concern that have likely not yet reached peak concentration are wellbutrin (seizures, tremor, tachycardia) and amlodipine (hypotension, pulmonary edema, seizure, confusion). CXR unremarkable and lung sounds clear on exam. UDS positive for benzodiazepines, cannabinoids and amphetamines. Amphetamines most likely a  false positive from her wellbutrin. Has significantly worsened tremor in hands likely 2/2 wellbutrin overdose, continuing on 8/17. Vitally stable currently, held all home medications initially but restarted her non-psychiatric meds on 8/16. On 8/17, patient reporting word finding difficulty, unknown etiology but possibly sequelae of overdose. Patient states one of the psych medications she ingested might have been one that she was discontinued on previously for causing tremors. Couldn't remember the exact medication; gave permission for us to check with her PCP.   - Poison control recommendations as below, signed off after >24 hours.  - Ativan 1 mg q4h prn for tremor  - Discontinue telemetry and continuous pulse ox at 24 hours.  - Recommend waiting a couple of days before restarting psych medications.  - Psychiatry consult, see note from 8/16 and 8/17 for recommendations and evaluation   - Will refer patient to 55+ outpatient program at the [facility] that she has previously participated in.   - Patient is voluntary and thus 72 hour hold is not necessary.  - Vitals q4h  - Low threshold to add seizure precautions if clinically worsens  -  and therapy consult placed  - Biotene spray for dry mouth.  - Start non-psychiatric PTA meds   - Amlodipine 5mg daily   - Atorvastatin 20mg daily   - Loratidine 10mg daily   - Prempro 0.3-1.5mg daily.  - HOLD trazodone, aripiprazole, wellbutrin, xanax, do not resume trazodone at discharge  - Called PCP to see if patient has previously had tremors as a side effect of her psych medications and to see if patient has ever been on medications for tremors. Left message with [facility] staff, they state Dr. Anisha Cosme, her PCP, will call back in the next x3 days.    #NIDHI on CKD - resolved  #RCC s/p right nephrectomy (07/2020)  Hx of renal cell carcinoma in 2020, found incidentally, curative tx with nephrectomy. Cr baseline in 2020 was around 0.85-0.90 but for past couple years has  increased with multiple Cr readings around 1.5. Most recent prior to admission Cr was 1.1 in March, now 1.48 on admission. NIDHI likely prerenal from poor PO intake and significant hypotension 2/2 amlodipine overdose, especially since returned to baseline at 1.19 on 8/17 after IV fluids. Currently on PO fluids.   - Daily CMP     #Hypokalemia, resolved  #QTc prolongation  S/p 40 mEq replacement. EKG with mild Qtc prolongation at 481. Mag and Phos normal.  - Telemetry discontinued at 24 hours.  - Standard K replacement protocol  - Daily CMP  - Caution with Qtc prolonging medications     #Recent fall  Fell on knees 8/14 evening after overdose. Did not hit head, no LOC. Small bruising on left knee, otherwise no signs of trauma noted on exam. On 8/17, continuing to report blurry vision and dizziness with standing.  - Fall precautions continued     #Financial distress  Currently unemployed for several months. Patient reports poor finances and fear of losing her house.  - Social work consult     Chronic:  #Fibromyalgia  - HOLD PTA cymbalta, likely discontinue at discharge as it has not been very helpful   - Start gabapentin at 100mg BID, increase to 100mg TID if tolerates    #Insomnia:   - STOP PTA trazodone, do not prescribe at discharge  - HOLD xanax    #HTN/HLD  - PTA amlodipine and atorvastatin, restarted 8/17    #Seasonal allergies PTA loratadine    #Reflux PTA omeprazole    #Vit D deficiency Continue Vit D supplement    #Post-Menopausal HRT PTA prempro        Diet: Combination Diet Regular Diet Adult    DVT Prophylaxis: Low Risk/Ambulatory with no VTE prophylaxis indicated. PADUA 1  Salas Catheter: Not present  Fluids: PO  Lines: PIV  Cardiac Monitoring: ACTIVE order. Indication: Drug overdose (24 hours)  Code Status: Full Code      Clinically Significant Risk Factors        # Hypokalemia: Lowest K = 2.9 mmol/L in last 2 days, will replace as needed    # Hypercalcemia: Highest Ca = 10.4 mg/dL in last 2 days, will  "monitor as appropriate               # Obesity: Estimated body mass index is 33.76 kg/m  as calculated from the following:    Height as of this encounter: 1.575 m (5' 2\").    Weight as of this encounter: 83.7 kg (184 lb 9.6 oz)., PRESENT ON ADMISSION       # Financial/Environmental Concerns: unemployed         Disposition Plan      Expected Discharge Date: 08/18/2023,  3:00 PM      Discharge Comments: awaiting another psych consult        The patient's care was discussed with the Attending Physician, Dr. Horacio Salazar .    Aura Gauthier, MS4  Medical Student    Resident/Fellow Attestation   I, Noemi Watts MD, was present with the medical/LOS student who participated in the service and in the documentation of the note.  I have verified the history and personally performed the physical exam and medical decision making.  I agree with the assessment and plan of care as documented in the note.          Noemi Watts MD  PGY2  Date of Service (when I saw the patient): 08/17/23      [facility]  [facility]  Securely message with Vocera (more info)  Text page via [facility] Paging/Directory   See signed in provider for up to date coverage information  ______________________________________________________________________    Interval History   Overnight: No changes or concerns.    Morning: Doing well, was able to talk to her son last night over the phone. Tremor still present and worse than at baseline. She does note that one of the medications she took during her attempt may be a medication that had previously caused her tremors. Also notes continuation of some blurry vision and mild dizziness with standing. States she can \"read\" but can't process what it says. States people have commented that her conversations aren't always \"right,\" and reports some word finding difficulties. States she is glad she is in the hospital receiving cares.    Physical Exam   Vital Signs: " Temp: 97.9  F (36.6  C) Temp src: Oral BP: (!) 140/93 Pulse: 91   Resp: 20 SpO2: 95 % O2 Device: None (Room air)    Weight: 184 lbs 9.6 oz    Constitutional: awake, alert, cooperative, no apparent distress, and appears stated age. Engaged in conversation.  Eyes: Lids and lashes normal, pupils equal, round and reactive to light. Visual field test is normal.  ENT: Normocephalic, without obvious abnormality, atraumatic, external ears without lesions.  Respiratory: No increased work of breathing, good air exchange, clear to auscultation bilaterally, no crackles or wheezing  Cardiovascular: Normal apical impulse, regular rate and rhythm, normal S1 and S2, no S3 or S4, and no murmur noted  Musculoskeletal: There is no redness, warmth, or swelling of the joints. Pain with palpation of the bilateral lower extremities that is at baseline due to Fibromyalgia.   Neurologic:   Awake, alert  Cranial nerves II-XII are intact  Moving all extremities well and symmetrically  Continuous fine tremor especially with movement, worsens with finger-nose testing. No myoclonic jerks. Coordination intact.   Neuropsychiatric: Awake and Alert. Normal and pleasant affect.     Medical Decision Making             Data   ------------------------- PAST 24 HR DATA REVIEWED -----------------------------------------------    I have personally reviewed the following data over the past 24 hrs:    5.2  \   12.3   / 294     142 109 (H) 12.7 /  88   3.8 23 1.19 (H) \     ALT: 23 AST: 26 AP: 103 TBILI: 0.2   ALB: 3.5 TOT PROTEIN: 5.8 (L) LIPASE: N/A

## 2023-08-17 NOTE — CONSULTS
Psychiatry Consultation; Follow up              Reason for Consult, requesting source:    Disposition, re consult   Requesting source: Primary team     Labs and imaging reviewed, seen by JAZZY Mariscal CNP     Total time spent in chart review, patient interview and coordination of care; 60 minutes on date of encounter today             Interim history:    Nancy Montemayor is a 62 year old female who has a history of MDD without previous suicide attempt, fibromyalgia, right nephrectomy (2020) 2/2 kidney cancer, admitted for suicide attempt x2 via overdose. 2 suicide attempts on 8/13 and 8/14 by overdose. See HPI for detailed report. No previous hx suicide attempts, ED admission in March 2023 where she was discharged with follow up, now s/p 90 day intensive outpatient group therapy, recently following with individual therapist and [facility] psychiatrist.      Per collateral from her sister: Patient was evaluated in 3/2023, discharged with IOP which she participated in. Patient was reportedly in a better place at the completion of programming, but went downhill again the past month. Patient is concerned about her financial situation, parents are in nursing care, isolating herself and no longer responding to family. Patient had a family event with her ex- 2 weeks ago. Patient made a comment to him about if anything happens to her, he should take care of the children. Patient and him had a bad marriage and nasty divorce. Patient's ex- expressed concern but patient denied any symptoms. Patient's sister received a text from patient's cleaning lady an apology for completing suicide, as patient expected to be dead. Patient was awake but altered after consuming numerous substances. Patient had every detail of her life laid out with instructions, outstanding bills, phone number logs, DNR, letters to her children, apologies, and explanations. Patient endorsed multiple ingestions over the past few days but her  "timeline is confused. Patient said she had planned this for the past 2 weeks.       follow up: Patient is more cognizant and oriented today. Her cousins, Malia, was at bedside with her and she wanted her to stay for assessment. Her family is very supportive and pt said she agrees she doesn't feel safe to go home, and she's agreeable to going to inpatient psych followed by 55+ IOP. She denies suicidality today, but also doesn't want to go home and see all of her notes regarding directions for her , burial, etc around the house. She does mention having covid last December and her mood worsening around then. She also talked about how she has struggled to find psychiatric medication that helps sxs without causing too many side effects. She mentioned her psychiatrist suggested Genes[employer] testing, but didn't want to pay for it. She also mentioned that in her preparatory acts that she was contemplating buying rat poison, but didn't.         Current Medications:      amLODIPine  5 mg Oral Daily    [Held by provider] ARIPiprazole  2 mg Oral Daily    atorvastatin  20 mg Oral Daily    [Held by provider] DULoxetine  60 mg Oral Daily    estrogen conj-medroxyPROGESTERone  1 tablet Oral Daily    loratadine  10 mg Oral Daily    omeprazole  20 mg Oral Daily    sodium chloride (PF)  3 mL Intracatheter Q8H    [Held by provider] traZODone  25-50 mg Oral At Bedtime    Vitamin D3  25 mcg Oral Daily              MSE:   Appearance: awake, alert, adequately groomed, and dressed in hospital scrubs  Attitude:  cooperative  Eye Contact:  good  Mood:  \"fair\"  Affect:  mood congruent  Speech:  clear, coherent  Psychomotor Behavior:  no evidence of tardive dyskinesia, dystonia, or tics  Muscle strength and tone: baseline   Thought Process:  logical, linear, and goal oriented  Associations:  no loose associations  Thought Content:  no evidence of suicidal ideation or homicidal ideation and no evidence of psychotic thought  Insight:  " "fair  Judgement:  limited  Oriented to:  time, person, and place  Attention Span and Concentration:  intact  Recent and Remote Memory:  intact    Vital signs:  Temp: 97.9  F (36.6  C) Temp src: Oral BP: (!) 140/93 Pulse: 91   Resp: 20 SpO2: 95 % O2 Device: None (Room air)   Height: 157.5 cm (5' 2\") Weight: 83.7 kg (184 lb 9.6 oz)  Estimated body mass index is 33.76 kg/m  as calculated from the following:    Height as of this encounter: 1.575 m (5' 2\").    Weight as of this encounter: 83.7 kg (184 lb 9.6 oz).    Qtc: 481 on 8/17         DSM-5 Diagnosis:   MDD  RAMAN          Assessment:   Patient is less cloudy today, and agrees she does not feel safe going home given the severity of her attempt and concerns for medications not adequately addressing her sxs. She is agreeable to inpatient psychiatry, I discussed the probable wait time and she verbalized understanding. Her chronic pain related to her fibromyalgia is distressing to her, I discussed using gabapentin and also discussed its off label uses for anxiety. She agreed to trial.           Summary of Recommendations:     Inpatient psychiatry is recommended -- placed on IP waiting list 8/17 at 14:45  Gabapentin 100mg BID  JAQUI to get records from outpatient psych -- hopefully get these by tomorrow  Melatonin for sleep tonight KATHY Chappell, PMHNP-BC  Consult/Liaison Psychiatry   [facility]       \"Much or all of the text in this note was generated through the use of Dragon Dictate voice to text software. Errors in spelling or words which appear to be out of contact are unintentional, may be present due having escaped editing\"           "

## 2023-08-17 NOTE — CONSULTS
IP Consult Completed today and patient has been placed on the wait list for IOP/55 plus programmatic care.

## 2023-08-17 NOTE — PROGRESS NOTES
"[facility]      PATIENT'S NAME: Nancy Montemayor  PREFERRED NAME: Nancy  PRONOUNS:   she/her    MRN: [id number]  : 1960  ADDRESS: [address] [place] [id number]  [facility]:  [id number]  DATE OF SERVICE: 8/15/23  START TIME: 8:32 AM  END TIME: 10:40 AM  PREFERRED PHONE: [phone]  May we leave a program related message: Yes  SERVICE MODALITY:  In-person    UNIVERSAL ADULT Mental Health DIAGNOSTIC ASSESSMENT    Identifying Information:  Patient is a 62 year old,  individual.  Patient was referred for an assessment by [facility].  Patient attended the session alone.    Chief Complaint:   The reason for seeking services at this time is: \" Lost her job, running out of money, feeling like she is in the box and it's closing in, and with so much pain she does feel like she can work anymore \" Pt has a history of MDD without previous suicide attempt, fibromyalgia, right nephrectomy () 2/2 kidney cancer, admitted for suicide attempt x2 via overdose. 2 suicide attempts on  and  by overdose. ED admission in 2023 where she was discharged with follow up, now s/p 90-day intensive outpatient group therapy, recently following with individual therapist and [facility] psychiatrist. She reports a strong desire to return to the 55+ program at [facility].  She reports satisfaction with care within this programming and that she found it to be helpful and supportive.   The problem(s) began 2023, FMLA , went to IOP, and return to work but could not do it. Patient has attempted to resolve these concerns in the past through IOP in 2023 till 2023, therapy and psychiatry .    Social/Family History:  Patient reported that she grew up in [place], [place].  She was raised by biological parents.  Parents stayed . Patient reported that her childhood was fine, mom had stroke at the age of 52 and now both are " "the nursing home which is very stressful.  Patient described her current relationships with family of origin as good.      The patient describes her cultural background as .  Cultural influences and impact on patient's life structure, values, norms, and healthcare:  none identified .  Contextual influences on patient's health include: NA.  Cultural, Contextual, and socioeconomic factors do not affect the patient's access to services.  These factors will be addressed in the Preliminary Treatment plan.  Patient identified her preferred language to be English. Patient reported that she does not  need the assistance of an  or other support involved in therapy.     Patient reported had no significant delays in developmental tasks.   Patient's highest education level was high school graduate. Patient identified the following learning problems: none reported.  Modifications will be used to assist communication in therapy.  Patient reports that she is  able to understand written materials.    Patient reported the following relationship history \" for 32 years\".  Patient's current relationship status is  for about 6 years ago and her daughter was very upset about this and has not talked to her since.  Patient identified her sexual orientation as heterosexual.  Patient reported having two child(debi).She has two adult children who live in [place].  Patient identified adult child and her girlfriends  as part of her support system.  Patient identified the quality of these relationships as good.     Patient's current living/housing situation involves staying in own home/apartment. She lives alone and reports that housing is stable.     Patient is currently unemployed but use to work at [employer] and reports it is very stressful and takes a significant toll on her mental health.  Patient reports that her finances are obtained through  ex- spouse support which is not enough .  " Patient does identify finances as a current stressor.      Patient reported that she has not been involved with the legal system. Patient denies being on probation / parole / under the jurisdiction of the court.    Patient's Strengths and Limitations:  Patient identified the following strengths or resources that will help them succeed in treatment: commitment to health and well being, community involvement, lyssa / spirituality, friends / good social support, family support, insight, and motivation. Things that may interfere with the patient's success in treatment include: financial hardship, lack of family support, lack of social support, and physical health concerns.     Assessments:  The following assessments were completed by patient for this visit:  PHQ9:       3/16/2023    10:00 AM 3/16/2023     1:11 PM 6/15/2023    11:31 AM 8/17/2023     9:00 AM   PHQ-9 SCORE   PHQ-9 Total Score MyChart  15 (Moderately severe depression) 1 (Minimal depression)    PHQ-9 Total Score 23 15 1 20     GAD7:       3/16/2023    10:00 AM 3/16/2023     1:14 PM 8/17/2023     9:00 AM   RAMAN-7 SCORE   Total Score  7 (mild anxiety)    Total Score 9 7 12     CAGE-AID:       3/16/2023     1:19 PM 8/17/2023     9:00 AM   CAGE-AID Total Score   Total Score 0 0   Total Score MyChart 0 (A total score of 2 or greater is considered clinically significant)      PROMIS 10-Global Health (all questions and answers displayed):       3/16/2023     1:19 PM 8/17/2023     9:00 AM   PROMIS 10   In general, would you say your health is: Fair    In general, would you say your quality of life is: Fair    In general, how would you rate your physical health? Fair    In general, how would you rate your mental health, including your mood and your ability to think? Poor    In general, how would you rate your satisfaction with your social activities and relationships? Poor    In general, please rate how well you carry out your usual social activities and roles Fair     To what extent are you able to carry out your everyday physical activities such as walking, climbing stairs, carrying groceries, or moving a chair? Mostly    In the past 7 days, how often have you been bothered by emotional problems such as feeling anxious, depressed, or irritable? Always    In the past 7 days, how would you rate your fatigue on average? Very severe    In the past 7 days, how would you rate your pain on average, where 0 means no pain, and 10 means worst imaginable pain? 4    In general, would you say your health is: 2 2   In general, would you say your quality of life is: 2 1   In general, how would you rate your physical health? 2 1   In general, how would you rate your mental health, including your mood and your ability to think? 1 1   In general, how would you rate your satisfaction with your social activities and relationships? 1 3   In general, please rate how well you carry out your usual social activities and roles. (This includes activities at home, at work and in your community, and responsibilities as a parent, child, spouse, employee, friend, etc.) 2 1   To what extent are you able to carry out your everyday physical activities such as walking, climbing stairs, carrying groceries, or moving a chair? 4 3   In the past 7 days, how often have you been bothered by emotional problems such as feeling anxious, depressed, or irritable? 5 4   In the past 7 days, how would you rate your fatigue on average? 5 4   In the past 7 days, how would you rate your pain on average, where 0 means no pain, and 10 means worst imaginable pain? 4 8   Global Mental Health Score 5 7   Global Physical Health Score 10 8   PROMIS TOTAL - SUBSCORES 15 15     Spangler Suicide Severity Rating Scale (Short Version)      3/14/2023     9:30 PM 8/15/2023     2:53 PM 8/15/2023     4:12 PM 8/16/2023     2:00 PM 8/16/2023     2:46 PM 8/16/2023     2:48 PM 8/17/2023     9:00 AM   Spangler Suicide Severity Rating (Short  Version)   Over the past 2 weeks have you felt down, depressed, or hopeless? yes yes        Over the past 2 weeks have you had thoughts of killing yourself? yes yes        Have you ever attempted to kill yourself? no yes        When did this last happen?  within the last 24 hours (including today)        Q1 Wished to be Dead (Past Month) yes yes    yes yes   Q2 Suicidal Thoughts (Past Month) yes yes     no   Q3 Suicidal Thought Method yes yes     no   Q4 Suicidal Intent without Specific Plan no yes     no   Q5 Suicide Intent with Specific Plan yes yes     no   Q6 Suicide Behavior (Lifetime) no no yes  yes  yes   Within the Past 3 Months?       yes   Level of Risk per Screen high risk high risk     high risk   High Risk Required Interventions On continuous in person observation On continuous in person observation        Required Interventions Belongings removed;Patient searched;Room made safe Provider notified;Room searched;Patient searched;Belongings removed        Interventions DEC consulted DEC consulted;Monitored via video        1. Wish to be Dead (Since Last Contact)    N      2. Non-Specific Active Suicidal Thoughts (Since Last Contact)    N      Actual Attempt (Since Last Contact)    N      Has subject engaged in non-suicidal self-injurious behavior? (Since Last Contact)    N      Interrupted Attempts (Since Last Contact)    N      Aborted or Self-Interrupted Attempt (Since Last Contact)    N      Preparatory Acts or Behavior (Since Last Contact)    N      Suicide (Since Last Contact)    N      Most Lethal Attempt Date    8/15/2023      Actual Lethality/Medical Damage Code (Most Lethal Attempt)    2      Calculated C-SSRS Risk Score (Since Last Contact)    No Risk Indicated          Personal and Family Medical History:  Patient does not report a family history of mental health concerns.  Patient reports family history is not on file..     Patient does report Mental Health Diagnosis and/or Treatment.  Patient  reported the following previous diagnoses which include(s): Depression.  Patient reported symptoms began probably in the last couple years.   Patient has received mental health services in the past:  individual and marriage therapy, psychiatry and [facility] .  Psychiatric Hospitalizations: admitted for suicide attempt x2 via overdose. 2 suicide attempts on 8/13 and 8/14 by overdose.  Patient denies a history of civil commitment.  Patient is receiving other mental health services.  These include psychotherapy with Renu Beck every two weeks and psychiatry with Dr N[facility]ak [facility].  Next appointment: on 8/21/23 .       Patient has had a physical exam to rule out medical causes for current symptoms.  Date of last physical exam was within the past year. Client was encouraged to follow up with PCP if symptoms were to develop. The patient has a non-[facility] Primary Care Provider. Primary Care Provider: Anisha Cosme MD - [facility]  [address]    [place] [address]    Phone: [phone]    Fax: [phone]    Patient reports the following current medical concerns: fibromyalgia, right nephrectomy (2020) 2/2 kidney cancer, and no current dental concerns.  Patient reports pain concerns including in her whole body.  Patient does want help addressing pain concerns.   There are significant appetite / nutritional concerns / weight changes. These may include: low appetite. Patient reports the following sleep concerns:  trouble falling asleep and oversleeping when she has nothing going on.   Patient does not report a history of head injury / trauma / cognitive impairment.      Patient reports current meds as:   Outpatient Medications Marked as Taking for the 8/15/23 encounter (Hospital Encounter)   Medication Sig    amLODIPine (NORVASC) 5 MG tablet Take 1 tablet by mouth daily    atorvastatin (LIPITOR) 20 MG tablet Take 20 mg by mouth     DULoxetine (CYMBALTA) 60 MG capsule Take 1 capsule by mouth daily    loratadine (CLARITIN) 10 MG tablet Take 1 tablet by mouth daily    omeprazole (PRILOSEC) 20 MG DR capsule Take 20 mg by mouth daily Before a meal    PREMPRO 0.3-1.5 MG tablet Take 1 tablet by mouth daily at 2 pm       Medication Adherence:  Patient reports taking prescribed medications as prescribed.    Patient Allergies:  No Known Allergies    Medical History:    Past Medical History:   Diagnosis Date    Depressive disorder     Migraines          Current Mental Status Exam:   Appearance:  Appropriate    Eye Contact:  Fair   Psychomotor:  Normal       Gait / station:  slow  Attitude / Demeanor: Cooperative   Speech      Rate / Production: Normal/ Responsive      Volume:  Normal  volume      Language:  no problems  Mood:   Depressed   Affect:   Appropriate    Thought Content: Clear   Thought Process: Circumstantial      Associations: No loosening of associations  Insight:   Fair   Judgment:  Intact   Orientation:  All  Attention/concentration: Fair    Substance Use:  Patient did not report a family history of substance use concerns; see medical history section for details.  Patient has received chemical dependency treatment in the past at [facility] treatment at [facility] for this when she was 25 years old .  Patient has not ever been to detox.      Patient is not currently receiving any chemical dependency treatment. Patient reported the following problems as a result of her substance use:  relationship problems.  She reports a hx of marijuana use, and she went to [facility] treatment at [facility] for this when she was 25 years old. She reports she started smoking marijuana again about two years ago daily and occasionally takes THC gummies.  Patient  reports drinking 1 glass of wine per night.    Patient denies using tobacco.  Patient reports  using marijuana, stopped for 40 years and just recently started doing THC edibles daily for past two years.    Patient denies using caffeine.  Patient reports using/abusing the following substance(s). Patient reported no other substance use.     Substance Use: daily use    Based on the negative CAGE score and clinical interview there  are indications of drug or alcohol abuse. Recommendation for substance abuse disorder evaluation with a substance use professional was given. Therapist did not recommend client to reduce use or abstain from alcohol or substance use. Therapist did not recommend structured treatment and or community support (AA, 12 step group, etc.). NA .    Significant Losses / Trauma / Abuse / Neglect Issues:   Patient did not serve in the .  There are indications or report of significant loss, trauma, abuse or neglect issues related to: are no indications and client denies any losses, trauma, abuse, or neglect concerns.  Concerns for possible neglect are not present.     Safety Assessment:   Patient denies current homicidal ideation and behaviors.  Patient denies current self-injurious ideation and behaviors.    Patient denied risk behaviors associated with substance use.  Patient denies any high risk behaviors associated with mental health symptoms.  Patient reports the following current concerns for her personal safety: None.  Patient reports there are not firearms in the house.       There are no firearms in the home..    History of Safety Concerns:  Patient denied a history of homicidal ideation.     Patient denied a history of personal safety concerns.    Patient denied a history of assaultive behaviors.    Patient denied a history of sexual assault behaviors.     Patient denied a history of risk behaviors associated with substance use.  Patient reported a history of substance use associated with mental health symptoms.  Patient reports the following protective factors: dedication to family or friends, strong bond to family unit, community support, or employment, responsibilities and duties to  others, including pets and children, lives in a responsibly safe and stable environment, supportive ongoing medical and mental health care relationships, help seeking and sense of belonging    Risk Plan:  See Recommendations for Safety and Risk Management Plan    Review of Symptoms per patient report:   Depression: Change in sleep, Lack of interest, Excessive or inappropriate guilt, Change in energy level, Difficulties concentrating, Change in appetite, Suicidal ideation, Feelings of hopelessness, Feelings of helplessness, Low self-worth, Ruminations, Irritability, Feeling sad, down, or depressed, Withdrawn, and Poor hygeine  Annalee:  No Symptoms  Psychosis: No Symptoms  Anxiety: Excessive worry, Nervousness, Sleep disturbance, Ruminations, Poor concentration, and Irritability  Panic:  No symptoms  Post Traumatic Stress Disorder:  No Symptoms   Eating Disorder: No Symptoms  ADD / ADHD:  No symptoms  Conduct Disorder: No symptoms  Autism Spectrum Disorder: No symptoms  Obsessive Compulsive Disorder: No Symptoms    Patient reports the following compulsive behaviors and treatment history:  none reported .      Diagnostic Criteria:   Generalized Anxiety Disorder  A. Excessive anxiety and worry about a number of events or activities (such as work or school performance).   B. The person finds it difficult to control the worry.  C. Select 3 or more symptoms (required for diagnosis). Only one item is required in children.   - Restlessness or feeling keyed up or on edge.    - Being easily fatigued.    - Difficulty concentrating or mind going blank.    - Irritability.    - Sleep disturbance (difficulty falling or staying asleep, or restless unsatisfying sleep).   D. The focus of the anxiety and worry is not confined to features of an Axis I disorder.  E. The anxiety, worry, or physical symptoms cause clinically significant distress or impairment in social, occupational, or other important areas of functioning.   F. The  disturbance is not due to the direct physiological effects of a substance (e.g., a drug of abuse, a medication) or a general medical condition (e.g., hyperthyroidism) and does not occur exclusively during a Mood Disorder, a Psychotic Disorder, or a Pervasive Developmental Disorder. Major Depressive Disorder  CRITERIA (A-C) REPRESENT A MAJOR DEPRESSIVE EPISODE - SELECT THESE CRITERIA  A) Recurrent episode(s) - symptoms have been present during the same 2-week period and represent a change from previous functioning 5 or more symptoms (required for diagnosis)   - Depressed mood. Note: In children and adolescents, can be irritable mood.     - Diminished interest or pleasure in all, or almost all, activities.    - Significant weight gaindecrease in appetite.    -  and increased sleep.    - Psychomotor activity agitation.    - Fatigue or loss of energy.    - Feelings of worthlessness or inappropriate guilt.    - Diminished ability to think or concentrate, or indecisiveness.    - Recurrent thoughts of death (not just fear of dying), recurrent suicidal ideation without a specific plan, or a suicide attempt or a specific plan for committing suicide.   B) The symptoms cause clinically significant distress or impairment in social, occupational, or other important areas of functioning  C) The episode is not attributable to the physiological effects of a substance or to another medical condition  D) The occurence of major depressive episode is not better explained by other thought / psychotic disorders  E) There has never been a manic episode or hypomanic episode    Functional Status:  Patient reports the following functional impairments:  chronic disease management, health maintenance, self-care, and work / vocational responsibilities.     Programmatic care:  Current LOCUS was assigned and patient needs the following level of care based on score 19  .    Clinical Summary:  1. Reason for assessment: Due to worsening  "mental health symptoms, suicidal ideation and decline in functioning.    2. Psychosocial, Cultural and Contextual Factors: loss of relationship due to divorce/separation, challenging interpersonal relationships, helplessness/hopelessness, other life stressors and other: stressful job.  3. Principal DSM5 Diagnoses  (Sustained by DSM5 Criteria Listed Above):   296.33 (F33.2) Major Depressive Disorder, Recurrent Episode, Severe _ and With anxious distress.  4. Other Diagnoses that is relevant to services:   300.02 (F41.1) Generalized Anxiety Disorder.  5. Provisional Diagnosis:  none.  6. Prognosis: Expect Improvement and Relieve Acute Symptoms.  7. Likely consequences of symptoms if not treated: patient's ongoing symptoms are more than likely to get worse and experience a decreased daily in functioning and may require a higher level of care.  8. Client strengths include:  educated, has a previous history of therapy, motivated, open to learning, open to suggestions / feedback, support of family, friends and providers, wants to learn, willing to ask questions, and willing to relate to others .     Recommendations:     1. Plan for Safety and Risk Management:   Safety and Risk: A safety and risk management plan has been developed including: When the patient identifies the following:  Suicidal Ideation with intent or intent & plan  (Yes to C-SSRS Suicidal Ideation #4 and #5) in the past month     The following will be initiated:  Complete/Review/Update Safety Plan  Safety Plan:  Adult Long Safety Plan:     [facility]                                       Nancy Montemayor     SAFETY PLAN:  Step 1: Warning signs / cues (Thoughts, images, mood, situation, behavior) that a crisis may be developing:  Thoughts: \"I don't matter\", \"People would be better off without me\", and \"I'm a burden\"  Images: obsessive thoughts of death or dying: ideations/thoughts  Thinking Processes: " "ruminations (can't stop thinking about my problems): on things and highly critical and negative thoughts: of self  Mood: worsening depression, hopelessness, helplessness, and intense worry  Behaviors: isolating/withdrawing , using drugs, using alcohol, not taking care of my responsibilities, sleeping too much, and not sleeping enough  Situations: changes in symptoms: depression/anxiety, financial stress, and medical condition / diagnosis: kidney issues and fibromyalgia    Step 2: Coping strategies - Things I can do to take my mind off of my problems without contacting another person (relaxation technique, physical activity):  Distress Tolerance Strategies:  listen to positive and upbeat music:   and watch a funny movie:    Physical Activities: go for a walk  Focus on helpful thoughts:  \"I will get through this\" and \"It always passes\"  Step 3: People and social settings that provide distraction:   Name: a lot girlfriends, sister and brother, son Phone: PATRICIA     park, library, and community center   Step 4: Remind myself of people and things that are important to me and worth living for:  \"I guess I am doing okay and being with friends is fun\"      Step 5: When I am in crisis, I can ask these people to help me use my safety plan:   Name: family and friends, crisis line Phone: NA    Step 6: Making the environment safe:   be around others  Step 7: Professionals or agencies I can contact during a crisis:  Suicide Prevention Lifeline: Call or Text 278   Local Crisis Services: [facility] Mobile Crisis  [phone] (adults)  [phone] (children)    Call 911 or go to my nearest emergency department.   I helped develop this safety plan and agree to use it when needed.  I have been given a copy of this plan.      Client signature _________________________________________________________________  Today s date:  8/17/2023  Completed by Provider Name/ Credentials:  ULISES Zavala/ARIS  August 17, 2023  Adapted from " "Safety Plan Template 2008 Lola Robison and Tony Resendez is reprinted with the express permission of the authors.  No portion of the Safety Plan Template may be reproduced without the express, written permission.  You can contact the authors at bhs@[email or web address] or maximo@[email or web address].    .  Patient consented to co-developed safety plan.  Safety and risk management plan was completed.  Patient agreed to use safety plan should any safety concerns arise.  A copy was given to the patient..          Report to child / adult protection services was NA.     2. Patient's identified no lyssa / Jainism / spiritual influences relevant to programming at this time.    3. Initial Treatment will focus on:   Depressed Mood -    Anxiety -    Functional Impairment at: work  Risk Management / Safety Concerns related to: Suicidal ideation.     4. Resources/Service Plan:    services are not indicated.   Modifications to assist communication are not indicated.   Additional disability accommodations are not indicated.      5. Collaboration:   Collaboration / coordination of treatment will be initiated with the following  support professionals: Targeted Case Management (TCM).      6.  Referrals:   The following referral(s) will be initiated: Outpatient Mental Health Therapy Group. Next Scheduled Appointment: Patient has been placed on the wait list for programming.      A Release of Information has been obtained for the following: Targeted Case Management (TCM). Kavita Wooten (Sister) [phone] (Mobile)     Emergency Contact JAQUI was obtained.      Clinical Substantiation/medical necessity for the above recommendations:  Patient is a 62-year-old heterosexual  female who presents with a history of depression. Patient is seeking services currently due to \" Lost her job, running out of money, feeling like she is in the box and it's closing in, and with so much pain she does feel like she can work anymore \" Pt " has a history of MDD without previous suicide attempt, fibromyalgia, right nephrectomy (2020) 2/2 kidney cancer, admitted for suicide attempt x2 via overdose. 2 suicide attempts on 8/13 and 8/14 by overdose. ED admission in March 2023 where she was discharged with follow up, now s/p 90-day intensive outpatient group therapy, recently following with individual therapist and [facility] psychiatrist. She reports a strong desire to return to the 55+ program at [facility].  She reports satisfaction with care within this programming and that she found it to be helpful and supportive.   Patient reported symptoms began probably in the last couple years.   Patient has received mental health services individual and marriage therapy, psychiatry and IOP in the past.  Pt was admitted for suicide attempt x2 via overdose. 2 suicide attempts on 8/13 and 8/14 by overdose.  Patient denies a history of civil commitment.  Patient is receiving psychotherapy with Renu Beck every two weeks and psychiatry with Dr Nayak with [facility].  Next appointment: on 8/21/23     Patient endorses with Change in sleep, Lack of interest, Excessive or inappropriate guilt, change in energy level, Difficulties concentrating, change in appetite, Suicidal ideation, Feelings of hopelessness, Feelings of helplessness, Low self-worth, Ruminations, Irritability, Feeling sad, down, or depressed, Withdrawn, and Poor hygiene. Excessive worry, Nervousness, Sleep disturbance, Ruminations, Poor concentration, and Irritability.      Patient endorses with two prior suicide attempt, and suicidal ideations with intent in the past month. Patient agrees with referral to IOP at [facility] and has been placed on the wait list. Patient's acute suicide risk was determined to be high due to the following factors: Admission of current suicidal ideations and past suicidal behaviors. Patient is not currently under the influence of alcohol or illicit substances, denies  experiencing command hallucinations, and has no direct access to firearms. Patient's acute risk could be higher if noncompliant with treatment plan, medications, follow-up appointments or using illicit substances or alcohol. Protective factors include dedication to family or friends, strong bond to family unit, community support, or employment, responsibilities, and duties to others, including pets and children, lives in a responsibly safe and stable environment, supportive ongoing medical and mental health care relationships, help seeking and sense of belonging. Patient reports current suicidal ideations and past suicidal behaviors, and is not currently using illicit substances, therefore, a safety plan was developed. Patient instructed to present to her nearest emergency room if symptoms deteriorate.    7. ARABELLA:    ARABELLA:  Discussed the general effects of drugs and alcohol on health and well-being. Provider gave patient printed information about the effects of chemical use on her health and well being. Recommendations:  Refrain from all mood altering substances.     8. Records:   These were reviewed at time of assessment.   Information in this assessment was obtained from the medical record and  provided by patient who is a fair historian.    Patient will have open access to their mental health medical record.    9.   Interactive Complexity: No    Provider Name/ Credentials:  August Alatorre, ULISES,  ARIS  Dual   Phone: [phone]  Fax: [phone]     August 17, 2023

## 2023-08-17 NOTE — PROGRESS NOTES
"/81 (BP Location: Left arm)   Pulse 88   Temp 97.9  F (36.6  C) (Oral)   Resp 20   Ht 1.575 m (5' 2\")   Wt 83.7 kg (184 lb 9.6 oz)   SpO2 93%   BMI 33.76 kg/m       A&O x4. On RA and sating 92%. Pt denies SOB, N/V, and chest pain. Tele in place showing NSR. L PIV saline locked. Pt reports tingling bilaterally in hands. Pt has PRN Ativan for tremors management. Independent in room, and use a walker. Continent of bowel and bladder. Voids spontaneously without difficulty. Regular diet. Patient able to make needs known and uses call light appropriately. Plan of care ongoing.  "

## 2023-08-18 ENCOUNTER — TELEPHONE (OUTPATIENT)
Dept: BEHAVIORAL HEALTH | Facility: CLINIC | Age: 63
End: 2023-08-18
Payer: COMMERCIAL

## 2023-08-18 LAB
ALBUMIN SERPL BCG-MCNC: 3.5 G/DL (ref 3.5–5.2)
ALP SERPL-CCNC: 107 U/L (ref 35–104)
ALT SERPL W P-5'-P-CCNC: 25 U/L (ref 0–50)
ANION GAP SERPL CALCULATED.3IONS-SCNC: 9 MMOL/L (ref 7–15)
AST SERPL W P-5'-P-CCNC: 23 U/L (ref 0–45)
BILIRUB SERPL-MCNC: 0.3 MG/DL
BUN SERPL-MCNC: 15 MG/DL (ref 8–23)
CALCIUM SERPL-MCNC: 9.2 MG/DL (ref 8.8–10.2)
CHLORIDE SERPL-SCNC: 106 MMOL/L (ref 98–107)
CREAT SERPL-MCNC: 1.23 MG/DL (ref 0.51–0.95)
DEPRECATED HCO3 PLAS-SCNC: 27 MMOL/L (ref 22–29)
ERYTHROCYTE [DISTWIDTH] IN BLOOD BY AUTOMATED COUNT: 12.5 % (ref 10–15)
GFR SERPL CREATININE-BSD FRML MDRD: 49 ML/MIN/1.73M2
GLUCOSE SERPL-MCNC: 94 MG/DL (ref 70–99)
HCT VFR BLD AUTO: 38.7 % (ref 35–47)
HGB BLD-MCNC: 13.2 G/DL (ref 11.7–15.7)
MCH RBC QN AUTO: 30.6 PG (ref 26.5–33)
MCHC RBC AUTO-ENTMCNC: 34.1 G/DL (ref 31.5–36.5)
MCV RBC AUTO: 90 FL (ref 78–100)
PLATELET # BLD AUTO: 300 10E3/UL (ref 150–450)
POTASSIUM SERPL-SCNC: 3.9 MMOL/L (ref 3.4–5.3)
PROT SERPL-MCNC: 6 G/DL (ref 6.4–8.3)
RBC # BLD AUTO: 4.32 10E6/UL (ref 3.8–5.2)
SODIUM SERPL-SCNC: 142 MMOL/L (ref 136–145)
VIT B12 SERPL-MCNC: 255 PG/ML (ref 232–1245)
WBC # BLD AUTO: 5.7 10E3/UL (ref 4–11)

## 2023-08-18 PROCEDURE — 80053 COMPREHEN METABOLIC PANEL: CPT | Performed by: FAMILY MEDICINE

## 2023-08-18 PROCEDURE — 36415 COLL VENOUS BLD VENIPUNCTURE: CPT | Performed by: FAMILY MEDICINE

## 2023-08-18 PROCEDURE — 120N000002 HC R&B MED SURG/OB UMMC

## 2023-08-18 PROCEDURE — 250N000013 HC RX MED GY IP 250 OP 250 PS 637: Performed by: FAMILY MEDICINE

## 2023-08-18 PROCEDURE — 99255 IP/OBS CONSLTJ NEW/EST HI 80: CPT | Performed by: PSYCHIATRY & NEUROLOGY

## 2023-08-18 PROCEDURE — 82607 VITAMIN B-12: CPT | Performed by: STUDENT IN AN ORGANIZED HEALTH CARE EDUCATION/TRAINING PROGRAM

## 2023-08-18 PROCEDURE — 99232 SBSQ HOSP IP/OBS MODERATE 35: CPT | Mod: GC | Performed by: FAMILY MEDICINE

## 2023-08-18 PROCEDURE — 250N000013 HC RX MED GY IP 250 OP 250 PS 637: Performed by: PSYCHIATRY & NEUROLOGY

## 2023-08-18 PROCEDURE — 250N000013 HC RX MED GY IP 250 OP 250 PS 637

## 2023-08-18 PROCEDURE — 250N000013 HC RX MED GY IP 250 OP 250 PS 637: Performed by: STUDENT IN AN ORGANIZED HEALTH CARE EDUCATION/TRAINING PROGRAM

## 2023-08-18 PROCEDURE — 85027 COMPLETE CBC AUTOMATED: CPT | Performed by: FAMILY MEDICINE

## 2023-08-18 RX ORDER — LORAZEPAM 0.5 MG/1
1 TABLET ORAL DAILY PRN
Status: DISCONTINUED | OUTPATIENT
Start: 2023-08-18 | End: 2023-08-19 | Stop reason: HOSPADM

## 2023-08-18 RX ORDER — MIRTAZAPINE 7.5 MG/1
7.5 TABLET, FILM COATED ORAL AT BEDTIME
Status: DISCONTINUED | OUTPATIENT
Start: 2023-08-18 | End: 2023-08-19 | Stop reason: HOSPADM

## 2023-08-18 RX ADMIN — GABAPENTIN 100 MG: 100 CAPSULE ORAL at 22:27

## 2023-08-18 RX ADMIN — AMLODIPINE BESYLATE 5 MG: 5 TABLET ORAL at 09:20

## 2023-08-18 RX ADMIN — Medication 1 CAPSULE: at 22:27

## 2023-08-18 RX ADMIN — CONJUGATED ESTROGENS AND MEDROXYPROGESTERONE ACETATE 1 TABLET: .3; 1.5 TABLET, SUGAR COATED ORAL at 11:21

## 2023-08-18 RX ADMIN — GABAPENTIN 100 MG: 100 CAPSULE ORAL at 09:20

## 2023-08-18 RX ADMIN — Medication 1 CAPSULE: at 09:20

## 2023-08-18 RX ADMIN — Medication 25 MCG: at 09:20

## 2023-08-18 RX ADMIN — ATORVASTATIN CALCIUM 20 MG: 20 TABLET, FILM COATED ORAL at 09:20

## 2023-08-18 RX ADMIN — MIRTAZAPINE 7.5 MG: 7.5 TABLET, FILM COATED ORAL at 22:27

## 2023-08-18 RX ADMIN — OMEPRAZOLE 20 MG: 20 CAPSULE, DELAYED RELEASE ORAL at 09:20

## 2023-08-18 ASSESSMENT — ACTIVITIES OF DAILY LIVING (ADL)
ADLS_ACUITY_SCORE: 21

## 2023-08-18 NOTE — TELEPHONE ENCOUNTER
[facility] Access Inpatient Bed Call Log 8/18/2023 1:36 AM      Intake has called facilities that have not updated their bed status within the last 12 hours.     Adults:       [facility] is posting 0 beds.    [facility] is posting 0 beds. [phone] - 1:42am Katie, they are capped.  [facility] is posting 0 beds. [phone]   [facility] is posting 0 beds. [phone] -1:41am Per Lolita, please call after 8am.   [facility] is posting 0 beds. [phone]   [facility] is posting 0 bed. [phone]    [facility] is posting 0 beds. [phone]   [facility] is posting 0 beds. [phone]   [facility] is posting 0 beds. [phone]    [facility] is posting 2 beds.           Pt remains on work list pending appropriate bed availability.

## 2023-08-18 NOTE — TELEPHONE ENCOUNTER
R: [place] ONLY    [place]  Access Inpatient Bed Call Log 8/18/23 @ 7:16 AM   Intake has called facilities that have not updated the bed status within the last 12 hours.                                         [facility] is at capacity.                        [facility] is posting 0 beds. [phone]. Per call at 7:16 am to [facility], they are at cap.               [facility] is posting 0 beds. Negative covid required.                           [facility] is posting 0 beds. Negative covid required. [phone] Per call at 7:17 am to Mily, they are full but she took intake s number and said they will call if a bed becomes avail later.               [facility] is posting 0 beds.                  [facility] is posting 0 beds. [phone]                    [facility] is posting 0 beds                     [facility] is posting 2 beds.  Low acuity.               [facility] is posting 0 beds. Low acuity only.              Pt remains on the work list pending appropriate bed availability.       4:03p Intake called 5 MED SURG Nurse to inquire about current Provider for pt, Nurse informed Dr. Gandhi is and gave Intake pager number to reach out to them.     4:06p Intake paged pt's Provider (Hiram) to inquire about direct number to contact for Doc to Doc if needed, Intake awaiting response.    4:07p Intake received call from pt's Provider (Hiram) who gave direct contact information to reach out.     4:11p Intake called Provider (Karl) for review of pt. Provider requesting to review pt d/t medical hx and will CB Intake. Intake notes eves to follow review of pt.
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
## 2023-08-18 NOTE — PROGRESS NOTES
[facility]    Progress Note - [facility] Service       Date of Admission:  8/15/2023    Main plans for today:  - Awaiting inpatient psych bed    Assessment & Plan   Nancy Montemayor is a 62 year old female who has a history of MDD without previous suicide attempt, fibromyalgia, kidney cancer s/p curative right nephrectomy (2020), admitted for suicide attempt x2 via overdose.     #Suicide attempt  #Polysubstance overdose (including CCB)  #MDD  #Word finding difficulty  2 suicide attempts on 8/13 and 8/14 by overdose. See H&P for detailed report. No previous hx suicide attempts, ED admission in March 2023 where she was discharged to 90 day intensive outpatient group therapy, recently following with individual therapist and [facility] psychiatrist. Difficulty recalling specific amount and medications and the amount of each taken; patient has reported different information to individual providers. On 8/14 around 9-10 pm, pt reports intent of suicide by taking 30+ pills of home medications including amlodipine (estimates 20 x 5mg), buspar (10 x 10mg), duloxetine, xanax, omeprazole, atorvastatin, trazodone, benadryl, THC gummies and 1 vodka tonic. Found by cleaning staff at her house and brought in via EMS, poison control called by ED, now s/p 3 g calcium gluconate for amlodipine toxicity. Negative alcohol, acetaminophen and salicylate levels. Per poison control, the only medications of concern that have likely not yet reached peak concentration are wellbutrin (seizures, tremor, tachycardia) and amlodipine (hypotension, pulmonary edema, seizure, confusion). CXR unremarkable and lung sounds clear on exam throughout course. UDS positive for benzodiazepines, cannabinoids and amphetamines. Amphetamines most likely a false positive from her wellbutrin. Has significantly worsened tremor in hands compared to her baseline essential tremor, likely 2/2 wellbutrin overdose,  continuing on  but slowly improving. Vitally stable currently, held all home medications initially but restarted her non-psychiatric meds on . On , patient reporting word finding difficulty, unknown etiology but possibly sequelae of overdose. Per PCP Dr. Anisha Cosme on , she last saw the patient in-person in 3/2023, at which point patient did not indicate any severe mood symptoms. She recommended increasing duloxetine to 60mg for depressive symptoms, patient declined. Then they had a phone visit on 2023, patient at this visit reported that group therapy was going well and was on additional medications that were prescribed by her psychiatrist.      to primary team and consultants, patient reports she spent multiple days prior to the attempt preparing notes for  preparations, financial records, and setting aside personal items for family members. She bought a paint tarp in preparation for her suicide as she did not want her family to have to clean up after her. Patient specifically had looked up contraindicated medications and deliberately took the medications together with alcohol. Multiple times during this preparation, patient canceled plans, answered phone calls from friends, and even had her sister visit in-person, however, patient was always able to mask her mental and emotional state. Friend reports that her group of friends were starting to get a little concerned but did not realize how bad the patient was actually doing. This is further supported by input from her PCP, who communicated to primary team that they had a phone visit on 2023, at which time pt reported to her that group therapy was going well and was on additional medications that were prescribed by her psychiatrist. Overall, may not reliably communicate the severity of symptoms, so evaluation of presence or absence of SI should be undertaken with care.     - Poison control recommendations as below, signed off after  >24 hours.  - Ativan 1 mg q4h prn for tremor - do not prescribe at discharge  - Discontinue telemetry and continuous pulse ox at 24 hours.  - Recommend waiting a couple of days before restarting psych medications.  - Psychiatry consult, see note from 8/16, 8/17, and 8/18 for recommendations and evaluation   - Currently on waiting list 55+ outpatient program at the [facility] that she has previously participated in.    - Placed on inpatient psych waiting list on 8/17 2:45pm  - Discontinue trazodone and Wellbutrin   - HOLD aripiprazole  - Continue non-psychiatric PTA meds   - Amlodipine 5mg daily   - Atorvastatin 20mg daily   - Loratidine 10mg daily   - Prempro 0.3-1.5mg daily.  - At discharge:  - Discontinue xanex. Original prescriber (PCP, Dr. Cosme) clarified by telephone that this was supposed to be a short term prescription  - Discontinue welbutrin and trazodone permanently. Patient reports they weren't helpful, and they have high overdose potential   - Patient is voluntary and thus 72 hour hold is not necessary. However, could be hold-able if she intends to leave AMA and does not have insight into severity of attempt and high risk of recurrent symptoms  - Low threshold to add seizure precautions if clinically worsens  -  and therapy consult placed  - Biotene spray for dry mouth.  - Suicide precautions    #NIDHI on CKD - resolved  #RCC s/p right nephrectomy (07/2020)  Hx of renal cell carcinoma in 2020, found incidentally, curative tx with nephrectomy. Cr baseline in 2020 was around 0.85-0.90 but for past couple years has increased with multiple Cr readings around 1.5. Most recent prior to admission Cr was 1.1 in March, now 1.48 on admission. NIDHI likely prerenal from poor PO intake and significant hypotension 2/2 amlodipine overdose, especially since returned to baseline at 1.19 on 8/17 after IV fluids. Currently on PO fluids. Cr stable.  - CMP q48h  - Monitor for nephrotoxicity as medications are initiated      #Hypokalemia, resolved  #QTc prolongation  S/p 40 mEq replacement. EKG with mild Qtc prolongation at 481. Mag and Phos normal.  - Telemetry discontinued at 24 hours.  - Standard K replacement protocol  - CMP q48h  - Caution with Qtc prolonging medications     #Recent fall  Fell on knees 8/14 evening after overdose. Did not hit head, no LOC. Small bruising on left knee, otherwise no signs of trauma noted on exam. Initially reporting some blurry vision and dizziness with standing, improved on 8/18.   - Fall precautions discontinued on 8/17     #Financial distress  Currently unemployed for several months. Patient reports poor finances and fear of losing her house.  - Social work consult     Chronic:  #Fibromyalgia  - HOLD PTA cymbalta, likely discontinue at discharge as it has not been very helpful   - Start gabapentin at 100mg BID, increase to 100mg TID if tolerates  - Start Tylenol 650 q6h PRN    #Diarrhea  - Start probiotics  - Consider loperamide if not improving.    #Insomnia:   - STOP PTA trazodone and xanex, do not prescribe at discharge  - melatonin HS PRN    #HTN/HLD  - PTA amlodipine and atorvastatin, restarted 8/17    #Seasonal allergies PTA loratadine    #Reflux PTA omeprazole    #Vit D deficiency Continue Vit D supplement    #Post-Menopausal HRT PTA prempro    #Benign essential tremor: NTD  On PCP Dr. Cosme's chart review, tremor is first mentioned in her notes as worsening with buproprion but no medication to indicate initial trigger for onset. Did not want beta blockers as treatment.           Diet: Combination Diet Regular Diet Adult    DVT Prophylaxis: Low Risk/Ambulatory with no VTE prophylaxis indicated. PADUA 1  Salas Catheter: Not present  Fluids: PO  Lines: None  Cardiac Monitoring: None  Code Status: Full Code      Clinically Significant Risk Factors                         # Obesity: Estimated body mass index is 33.76 kg/m  as calculated from the following:    Height as of this encounter: 1.575 m  "(5' 2\").    Weight as of this encounter: 83.7 kg (184 lb 9.6 oz)., PRESENT ON ADMISSION       # Financial/Environmental Concerns: unemployed         Disposition Plan      Expected Discharge Date: 08/18/2023,  3:00 PM      Discharge Comments: awaiting another psych consult.        The patient's care was discussed with the Attending Physician, Dr. Horacio Salazar .    Aura Gauthier, MS4  Medical Student    Resident/Fellow Attestation    I, Noemi Watts, was present with the medical/LOS student who participated in the service and in the documentation of the note.  I have verified the history and personally performed the physical exam and medical decision making.  I agree with the assessment and plan of care as documented in the note.      Noemi Watts MD  PGY2  Date of Service (when I saw the patient): 8/18/2023        [facility]  [facility]  Securely message with [employer] (more info)  Text page via [facility] Paging/Directory   See signed in provider for up to date coverage information  ______________________________________________________________________    Interval History   Overnight: No changes or concerns.    Morning: Doing well, was able to sleep comfortably. Blurry vision and dizziness still present but improved compared to yesterday. Notes family and friend commented yesterday that she does not seem as confused anymore. Less issues with word finding. No episodes of diarrhea overnight.    Physical Exam   Vital Signs: Temp: 98.2  F (36.8  C) Temp src: Oral BP: (!) 129/90 Pulse: 88   Resp: 16 SpO2: 94 % O2 Device: None (Room air)    Weight: 184 lbs 9.6 oz    Constitutional: awake, alert, cooperative, no apparent distress, and appears stated age. Engaged in conversation.  Eyes: Lids and lashes normal, pupils equal, round and reactive to light.   ENT: Normocephalic, without obvious abnormality, atraumatic, external ears without lesions.  Respiratory: No " increased work of breathing, good air exchange, clear to auscultation bilaterally, no crackles or wheezing  Cardiovascular: Normal apical impulse, regular rate and rhythm, normal S1 and S2, no S3 or S4, and no murmur noted  Musculoskeletal: There is no redness, warmth, or swelling of the joints. Pain with palpation of the bilateral lower extremities that is at baseline due to Fibromyalgia.   Neurologic:   Awake, alert  Cranial nerves II-XII are intact  Moving all extremities well and symmetrically  Continuous fine tremor especially with movement, worsens with finger-nose testing (L>R) but improved compared to yesterday. No myoclonic jerks. Coordination intact.   Neuropsychiatric: Awake and Alert. Normal and pleasant affect.     Medical Decision Making             Data   ------------------------- PAST 24 HR DATA REVIEWED -----------------------------------------------    I have personally reviewed the following data over the past 24 hrs:    5.7  \   13.2   / 300     142 106 15.0 /  94   3.9 27 1.23 (H) \     ALT: 25 AST: 23 AP: 107 (H) TBILI: 0.3   ALB: 3.5 TOT PROTEIN: 6.0 (L) LIPASE: N/A     TSH: N/A T4: N/A A1C: N/A

## 2023-08-18 NOTE — PROGRESS NOTES
"/78 (BP Location: Right arm)   Pulse 83   Temp 97.6  F (36.4  C) (Oral)   Resp 16   Ht 1.575 m (5' 2\")   Wt 83.7 kg (184 lb 9.6 oz)   SpO2 92%   BMI 33.76 kg/m       Pt alert and oriented x4. VSS. On RA. Pt denied pain, SOB, CP, N/V. Pt denied SI. Pt has PRN Ativan for tremors management. Continent of bowel and bladder. Voids spontaneously without difficulty. Up independently in room. Regular diet. Pt's call light within reach and uses appropriately. Plan to [facility] when [facility] is available. Plan of care ongoing.  "

## 2023-08-18 NOTE — PROGRESS NOTES
"Brief Progress Note   8/18/23 - 3:00pm    Received a second call from Dr. Anisha Cosme, patient's PCP.     Wanted to note that at the patient's last in-person visit in March 2023, patient had low B12 and chronically elevated ALP.     Also reports that the patient has been very angry with her for not \"caring enough\" about her mental health. Patient had shared with the writer the same sentiment and stated that at the last visit, she had tested very high on the PHQ-9 but that Dr. Cosme never even took a look. During the phone call, Dr. Cosme reviewed her chart and states that the patient's PHQ-9 at the time was 5 and RAMAN a 2. She also states they had spent most of that clinic visit talking about the patient's mental health and patient had denied SI.    Aura Gauthier, MS4    Resident/Fellow Attestation   I, Noemi Watts MD, was present with the medical/OLS student who participated in the service and in the documentation of the note.  I have verified the history and personally performed the physical exam and medical decision making.  I agree with the assessment and plan of care as documented in the note.      Noemi Watts MD  PGY2  Date of Service (when I saw the patient): 08/18/23    "
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
## 2023-08-19 ENCOUNTER — HOSPITAL ENCOUNTER (INPATIENT)
Facility: CLINIC | Age: 63
LOS: 5 days | Discharge: HOME | End: 2023-08-24
Attending: PSYCHIATRY & NEUROLOGY | Admitting: PSYCHIATRY & NEUROLOGY
Payer: COMMERCIAL

## 2023-08-19 VITALS
TEMPERATURE: 98.7 F | HEART RATE: 96 BPM | SYSTOLIC BLOOD PRESSURE: 140 MMHG | OXYGEN SATURATION: 95 % | BODY MASS INDEX: 33.97 KG/M2 | HEIGHT: 62 IN | DIASTOLIC BLOOD PRESSURE: 105 MMHG | WEIGHT: 184.6 LBS | RESPIRATION RATE: 16 BRPM

## 2023-08-19 DIAGNOSIS — N18.2 CHRONIC KIDNEY DISEASE, STAGE 2 (MILD): Chronic | ICD-10-CM

## 2023-08-19 DIAGNOSIS — F33.2 SEVERE EPISODE OF RECURRENT MAJOR DEPRESSIVE DISORDER, WITHOUT PSYCHOTIC FEATURES (H): ICD-10-CM

## 2023-08-19 DIAGNOSIS — K21.9 GASTROESOPHAGEAL REFLUX DISEASE WITHOUT ESOPHAGITIS: ICD-10-CM

## 2023-08-19 DIAGNOSIS — T14.91XA SUICIDE ATTEMPT (H): Primary | ICD-10-CM

## 2023-08-19 PROBLEM — T50.912A: Status: ACTIVE | Noted: 2023-08-19

## 2023-08-19 LAB
HOLD SPECIMEN: NORMAL
POTASSIUM SERPL-SCNC: 3.7 MMOL/L (ref 3.4–5.3)

## 2023-08-19 PROCEDURE — 250N000013 HC RX MED GY IP 250 OP 250 PS 637: Performed by: STUDENT IN AN ORGANIZED HEALTH CARE EDUCATION/TRAINING PROGRAM

## 2023-08-19 PROCEDURE — 250N000013 HC RX MED GY IP 250 OP 250 PS 637

## 2023-08-19 PROCEDURE — 36415 COLL VENOUS BLD VENIPUNCTURE: CPT | Performed by: FAMILY MEDICINE

## 2023-08-19 PROCEDURE — 99239 HOSP IP/OBS DSCHRG MGMT >30: CPT | Mod: GC | Performed by: FAMILY MEDICINE

## 2023-08-19 PROCEDURE — 84132 ASSAY OF SERUM POTASSIUM: CPT | Performed by: FAMILY MEDICINE

## 2023-08-19 PROCEDURE — 250N000013 HC RX MED GY IP 250 OP 250 PS 637: Performed by: FAMILY MEDICINE

## 2023-08-19 PROCEDURE — 99223 1ST HOSP IP/OBS HIGH 75: CPT | Mod: AI | Performed by: PSYCHIATRY & NEUROLOGY

## 2023-08-19 PROCEDURE — 124N000002 HC R&B MH UMMC

## 2023-08-19 RX ORDER — ATORVASTATIN CALCIUM 20 MG/1
20 TABLET, FILM COATED ORAL DAILY
Status: DISCONTINUED | OUTPATIENT
Start: 2023-08-20 | End: 2023-08-24 | Stop reason: HOSPADM

## 2023-08-19 RX ORDER — MIRTAZAPINE 7.5 MG/1
7.5 TABLET, FILM COATED ORAL AT BEDTIME
Status: DISCONTINUED | OUTPATIENT
Start: 2023-08-19 | End: 2023-08-24 | Stop reason: HOSPADM

## 2023-08-19 RX ORDER — ARIPIPRAZOLE 2 MG/1
2 TABLET ORAL DAILY
Status: DISCONTINUED | OUTPATIENT
Start: 2023-08-20 | End: 2023-08-24 | Stop reason: HOSPADM

## 2023-08-19 RX ORDER — GABAPENTIN 100 MG/1
100 CAPSULE ORAL 2 TIMES DAILY
Status: DISCONTINUED | OUTPATIENT
Start: 2023-08-19 | End: 2023-08-21

## 2023-08-19 RX ORDER — LACTOBACILLUS RHAMNOSUS GG 10B CELL
1 CAPSULE ORAL 2 TIMES DAILY
Status: DISCONTINUED | OUTPATIENT
Start: 2023-08-19 | End: 2023-08-24 | Stop reason: HOSPADM

## 2023-08-19 RX ORDER — ACETAMINOPHEN 325 MG/1
650 TABLET ORAL EVERY 6 HOURS PRN
Status: DISCONTINUED | OUTPATIENT
Start: 2023-08-19 | End: 2023-08-24 | Stop reason: HOSPADM

## 2023-08-19 RX ORDER — AMLODIPINE BESYLATE 5 MG/1
5 TABLET ORAL DAILY
Status: DISCONTINUED | OUTPATIENT
Start: 2023-08-20 | End: 2023-08-24 | Stop reason: HOSPADM

## 2023-08-19 RX ORDER — LORAZEPAM 1 MG/1
1 TABLET ORAL DAILY PRN
Status: DISCONTINUED | OUTPATIENT
Start: 2023-08-19 | End: 2023-08-24 | Stop reason: HOSPADM

## 2023-08-19 RX ORDER — LORATADINE 10 MG/1
10 TABLET ORAL DAILY
Status: DISCONTINUED | OUTPATIENT
Start: 2023-08-20 | End: 2023-08-24 | Stop reason: HOSPADM

## 2023-08-19 RX ORDER — SALIVA STIMULANT COMB. NO.3
2 SPRAY, NON-AEROSOL (ML) MUCOUS MEMBRANE 4 TIMES DAILY
Status: DISCONTINUED | OUTPATIENT
Start: 2023-08-19 | End: 2023-08-24 | Stop reason: HOSPADM

## 2023-08-19 RX ORDER — VITAMIN B COMPLEX
25 TABLET ORAL DAILY
Status: DISCONTINUED | OUTPATIENT
Start: 2023-08-20 | End: 2023-08-24 | Stop reason: HOSPADM

## 2023-08-19 RX ADMIN — Medication 25 MCG: at 09:08

## 2023-08-19 RX ADMIN — ACETAMINOPHEN 650 MG: 325 TABLET ORAL at 09:08

## 2023-08-19 RX ADMIN — Medication 1 MG: at 22:14

## 2023-08-19 RX ADMIN — Medication 2 SPRAY: at 22:15

## 2023-08-19 RX ADMIN — Medication 1 CAPSULE: at 22:16

## 2023-08-19 RX ADMIN — MIRTAZAPINE 7.5 MG: 7.5 TABLET, FILM COATED ORAL at 22:13

## 2023-08-19 RX ADMIN — ATORVASTATIN CALCIUM 20 MG: 20 TABLET, FILM COATED ORAL at 09:07

## 2023-08-19 RX ADMIN — GABAPENTIN 100 MG: 100 CAPSULE ORAL at 09:07

## 2023-08-19 RX ADMIN — Medication 1 CAPSULE: at 09:07

## 2023-08-19 RX ADMIN — GABAPENTIN 100 MG: 100 CAPSULE ORAL at 22:14

## 2023-08-19 RX ADMIN — OMEPRAZOLE 20 MG: 20 CAPSULE, DELAYED RELEASE ORAL at 09:08

## 2023-08-19 RX ADMIN — AMLODIPINE BESYLATE 5 MG: 5 TABLET ORAL at 09:07

## 2023-08-19 RX ADMIN — CONJUGATED ESTROGENS AND MEDROXYPROGESTERONE ACETATE 1 TABLET: .3; 1.5 TABLET, SUGAR COATED ORAL at 09:13

## 2023-08-19 ASSESSMENT — ACTIVITIES OF DAILY LIVING (ADL)
ADLS_ACUITY_SCORE: 21
FALL_HISTORY_WITHIN_LAST_SIX_MONTHS: YES
ADLS_ACUITY_SCORE: 21
ADLS_ACUITY_SCORE: 28
ADLS_ACUITY_SCORE: 21
ADLS_ACUITY_SCORE: 21
ADLS_ACUITY_SCORE: 28
DIFFICULTY_COMMUNICATING: NO
DIFFICULTY_COMMUNICATING: NO
WALKING_OR_CLIMBING_STAIRS_DIFFICULTY: NO
ADLS_ACUITY_SCORE: 21
WEAR_GLASSES_OR_BLIND: NO
DOING_ERRANDS_INDEPENDENTLY_DIFFICULTY: NO
ADLS_ACUITY_SCORE: 21
CONCENTRATING,_REMEMBERING_OR_MAKING_DECISIONS_DIFFICULTY: NO
DIFFICULTY_COMMUNICATING: NO
NUMBER_OF_TIMES_PATIENT_HAS_FALLEN_WITHIN_LAST_SIX_MONTHS: 2
TOILETING_ISSUES: NO
ADLS_ACUITY_SCORE: 21
HEARING_DIFFICULTY_OR_DEAF: NO
ADLS_ACUITY_SCORE: 21
DIFFICULTY_EATING/SWALLOWING: NO
HEARING_DIFFICULTY_OR_DEAF: NO
DRESSING/BATHING_DIFFICULTY: NO

## 2023-08-19 ASSESSMENT — LIFESTYLE VARIABLES
AUDIT-C TOTAL SCORE: 0
SKIP TO QUESTIONS 9-10: 1

## 2023-08-19 NOTE — PROGRESS NOTES
"BP (!) 132/95 (BP Location: Left arm)   Pulse 90   Temp 98.8  F (37.1  C) (Oral)   Resp 16   Ht 1.575 m (5' 2\")   Wt 83.7 kg (184 lb 9.6 oz)   SpO2 95%   BMI 33.76 kg/m         Pt is alert&ox4,call light within reach. Able to make needs known. Pt denies sob, chest pain, n/v, and n/t and dizziness.     Tremors in both hands and feet.    Pt independent in room.     LBM 8/18, voids spontaneously without difficulty    Pt denied pain     Skin intact.     Regular diet.    RN potassium managed.     P:Continue with plan of care       "

## 2023-08-19 NOTE — TELEPHONE ENCOUNTER
R: [place]  Access Inpatient Bed Call Log 8/18/23 7:40PM   Intake has called facilities that have not updated the bed status within the last 12 hours.                           [facility] is at capacity.            [facility] is posting 0 beds. [phone]. Called 2 times, no answer. Will continue calling.   [facility] is posting 0 beds. Negative covid required.               [facility] is posting 0 beds. Negative covid required. [phone] Per call at 7:42PM with ernie Vanegas.    [facility] is posting 0 beds.      [facility] is posting 2 beds. [phone]. Per Satya, at capacity   [facility] is posting 0 beds         [facility] is posting 1 bed.  Per call with Ramona at 7:51PM, Low acuity only.   [facility] is posting 0 beds. Low acuity only.

## 2023-08-19 NOTE — PROGRESS NOTES
[facility]    Progress Note - \[facility]\"" Family Medicine Service       Date of Admission:  8/15/2023    Main Plans for Today   - Awaiting inpatient psych bed    Assessment & Plan   Nancy Montemayor is a 62 year old female who has a history of MDD without previous suicide attempt, fibromyalgia, kidney cancer s/p curative right nephrectomy (2020), admitted for suicide attempt x2 via overdose.     #Suicide attempt  #Polysubstance overdose (including CCB)  #MDD  #Word finding difficulty  2 suicide attempts on 8/13 and 8/14 by overdose. See H&P for detailed report. No previous hx suicide attempts, ED admission in March 2023 where she was discharged to 90 day intensive outpatient group therapy, recently following with individual therapist and [facility] psychiatrist. Difficulty recalling specific amount and medications and the amount of each taken; patient has reported different information to individual providers. On 8/14 around 9-10 pm, pt reports intent of suicide by taking 30+ pills of home medications including amlodipine (estimates 20 x 5mg; toxidrome: hypotension, pulmonary edema, seizures, confusion), buspar (10 x 10mg), duloxetine, xanax, omeprazole, atorvastatin, trazodone, Wellbutrin (toxidrome: seizures, tremor, tachycardia), benadryl, THC gummies and 1 vodka tonic. Found by cleaning staff at her house and brought in via EMS, poison control called by ED, now s/p 3 g calcium gluconate for amlodipine toxicity. Negative alcohol, acetaminophen and salicylate levels. CXR unremarkable and lung sounds clear on exam throughout course. UDS positive for benzodiazepines, cannabinoids and amphetamines. Amphetamines most likely a false positive from her wellbutrin. Has significantly worsened tremor in hands compared to her baseline essential tremor, likely 2/2 wellbutrin overdose, but slowly improving. Vitally stable currently, held all home medications initially but restarted her  non-psychiatric meds on . On , patient reporting word finding difficulty, unknown etiology but possibly sequelae of overdose.     Patient reports she spent multiple days prior to the attempt preparing notes for  preparations, financial records, and setting aside personal items for family members. She bought a paint tarp in preparation for her suicide as she did not want her family to have to clean up after her. Patient specifically had looked up contraindicated medications and deliberately took the medications together with alcohol. Multiple times during this preparation, patient canceled plans, answered phone calls from friends, and even had her sister visit in-person, however, patient was always able to mask her mental and emotional state. Friend reports that her group of friends were starting to get a little concerned but did not realize how bad the patient was actually doing. This is further supported by input from her PCP. Dr. Cosme states when she last patient in-person in 3/2023, at which point patient did not indicate any severe mood symptoms. She recommended increasing duloxetine to 60mg for depressive symptoms, and patient declined. They had a phone visit on 2023, at which time pt reported to her that group therapy was going well and was on additional medications that were prescribed by her psychiatrist (see brief progress note under Dr. Watts ). Overall, may not reliably communicate the severity of symptoms, so evaluation of presence or absence of SI should be undertaken with care.     - Poison control recommendations as below, signed off after >24 hours.  - Ativan 1 mg daily prn for tremor - do not prescribe at discharge  - Recommend waiting a couple of days before restarting psych medications.  - Psychiatry consult, see note from , , and  for recommendations and evaluation   - [facility] 55+ [facility]     - Placed on inpatient psych  waiting list on 8/17 2:45pm   - Consider restarting Cymbalta and increasing dose fo 120 mg qAM   - Remeron 7.5 mg at bedtime   - Consider trial of ritalin  - Discontinue trazodone and Wellbutrin   - HOLD aripiprazole  - Gabapentin at 100mg BID, increase to 100mg TID if tolerates  - Continue non-psychiatric PTA meds   - Amlodipine 5mg daily   - Atorvastatin 20mg daily   - Loratidine 10mg daily   - Prempro 0.3-1.5mg daily.  - At discharge:  - Discontinue xanex. Original prescriber (PCP, Dr. Cosme) clarified by telephone that this was supposed to be a short term prescription  - Discontinue welbutrin and trazodone permanently. Patient reports they weren't helpful, and they have high overdose potential   - Patient is voluntary and thus 72 hour hold is not necessary. However, could be hold-able if she intends to leave AMA and does not have insight into severity of attempt and high risk of recurrent symptoms  - Low threshold to add seizure precautions if clinically worsens  -  and therapy consult placed  - Biotene spray for dry mouth  - Suicide precautions    # NIDHI on CKD, resolved  #RCC s/p right nephrectomy (07/2020)  Hx of renal cell carcinoma in 2020, found incidentally, curative tx with nephrectomy. Cr baseline in 2020 was around 0.85-0.90 but for past couple years has increased with multiple Cr readings around 1.5. Most recent prior to admission Cr was 1.1 in March, now 1.48 on admission. NIDHI likely prerenal from poor PO intake and significant hypotension 2/2 amlodipine overdose, especially since returned to baseline at 1.19 on 8/17 after IV fluids. Currently on PO fluids. Cr stable.  - CMP q48h  - Monitor for nephrotoxicity as medications are initiated     # QTc prolongation  # Hypokalemia, resolved  S/p 40 mEq replacement. EKG with mild Qtc prolongation at 481. Mag and Phos normal.  - Telemetry discontinued at 24 hours.  - Standard K replacement protocol  - CMP q48h  - Caution with Qtc prolonging medications    "  # Recent fall  Fell on knees 8/14 evening after overdose. Did not hit head, no LOC. Small bruising on left knee, otherwise no signs of trauma noted on exam. Initially reporting some blurry vision and dizziness with standing, improved on 8/18.   - Fall precautions discontinued on 8/17     # Financial distress  Currently unemployed for several months. Patient reports poor finances and fear of losing her house.  - Social work consult     Chronic:  #Fibromyalgia  - HOLD PTA cymbalta, likely discontinue at discharge as it has not been very helpful   - Gabapentin at 100mg BID, increase to 100mg TID if tolerates  - Start Tylenol 650 q6h PRN    #Diarrhea  - Start probiotics  - Consider loperamide if not improving.    # Insomnia   - STOP PTA trazodone and xanex, do not prescribe at discharge  - Remeron 7.5 mg at bedtime as above    #HTN/HLD PTA amlodipine and atorvastatin, restarted 8/17  #Seasonal allergies PTA loratadine  #Reflux PTA omeprazole  #Vit D deficiency Continue Vit D supplement  #Post-Menopausal HRT PTA prempro    #Benign essential tremor: NTD  On PCP Dr. Cosme's chart review, tremor is first mentioned in her notes as worsening with buproprion but no medication to indicate initial trigger for onset. Did not want beta blockers as treatment.         Diet: Combination Diet Regular Diet Adult    DVT Prophylaxis: Low Risk/Ambulatory with no VTE prophylaxis indicated. PADUA 1  Salas Catheter: Not present  Fluids: PO  Lines: None  Cardiac Monitoring: None  Code Status: Full Code      Clinically Significant Risk Factors                      # Obesity: Estimated body mass index is 33.76 kg/m  as calculated from the following:    Height as of this encounter: 1.575 m (5' 2\").    Weight as of this encounter: 83.7 kg (184 lb 9.6 oz)., PRESENT ON ADMISSION       # Financial/Environmental Concerns: unemployed         Disposition Plan      Expected Discharge Date: 08/20/2023,  3:00 PM      Discharge Comments: awaiting IP psych " placement.        The patient's care was discussed with the Attending Physician, Dr. Horacio Salazar .    Sudha Gandhi MD  [facility]  [facility]  Securely message with [employer] (more info)  Text page via Telemedicine Clinic Paging/Directory   See signed in provider for up to date coverage information  ______________________________________________________________________    Interval History   Overnight: No changes or concerns.    Patient waking up when I interviewed her this morning. No complaints. Still interested inpatient psychiatry. Hopeful that she will be able to transition to inpatient psych soon    Physical Exam   Vital Signs: Temp: 98.8  F (37.1  C) Temp src: Oral BP: 123/88 Pulse: 93   Resp: 16 SpO2: 95 % O2 Device: None (Room air)    Weight: 184 lbs 9.6 oz    Constitutional: awake, alert, cooperative, no apparent distress, and appears stated age. Engaged in conversation.  Eyes: Lids and lashes normal.   ENT: Normocephalic, without obvious abnormality, atraumatic, external ears without lesions.  Respiratory: No increased work of breathing  Cardiovascular: Regular rate  Neurologic:   Awake, alert  Moving all extremities well and symmetrically  No myoclonic jerks.  Neuropsychiatric: Awake and Alert. Normal and pleasant affect.     Medical Decision Making         Data   ------------------------- PAST 24 HR DATA REVIEWED -----------------------------------------------      N/A  \   N/A   / N/A     N/A N/A N/A /  N/A   3.7 N/A N/A \

## 2023-08-19 NOTE — TELEPHONE ENCOUNTER
[place]: [place]  Access Inpatient Bed Call Log 8/19/23 8:30 AM ([facility])  Intake has called facilities that have not updated the bed status within the last 12 hours.                  [facility] is at capacity.            [facility] is posting 0 beds. [phone].   [facility] is posting 0 beds. Negative covid required.               [facility] is posting 0 beds. Negative covid required. [phone]   [place] is posting 0 beds.      [facility] is posting 2 beds. [phone]. Per website @3am  [facility] is posting 0 beds         [facility] is posting 1 bed.  Per website @3:49 am    Patient remains on the work list pending appropriate bed availability.       1:41 PM Paged Resident Willie.    2:37 PM Writer received message from another PPS that patient was accepted to unit 20 under Dr. Shaw.    2:54 PM Paged Resident doc to doc number.    3:00 PM CRN informed of pt in queue. Advised unit to call after 4  pm for report.    3:04 PM ED notified

## 2023-08-20 PROCEDURE — 124N000002 HC R&B MH UMMC

## 2023-08-20 PROCEDURE — 250N000013 HC RX MED GY IP 250 OP 250 PS 637

## 2023-08-20 RX ORDER — LIDOCAINE 4 G/G
2 PATCH TOPICAL
Status: DISCONTINUED | OUTPATIENT
Start: 2023-08-21 | End: 2023-08-24 | Stop reason: HOSPADM

## 2023-08-20 RX ADMIN — Medication 1 CAPSULE: at 09:00

## 2023-08-20 RX ADMIN — ATORVASTATIN CALCIUM 20 MG: 20 TABLET, FILM COATED ORAL at 09:02

## 2023-08-20 RX ADMIN — GABAPENTIN 100 MG: 100 CAPSULE ORAL at 09:02

## 2023-08-20 RX ADMIN — MIRTAZAPINE 7.5 MG: 7.5 TABLET, FILM COATED ORAL at 21:17

## 2023-08-20 RX ADMIN — CONJUGATED ESTROGENS AND MEDROXYPROGESTERONE ACETATE 1 TABLET: .3; 1.5 TABLET, SUGAR COATED ORAL at 12:40

## 2023-08-20 RX ADMIN — AMLODIPINE BESYLATE 5 MG: 5 TABLET ORAL at 09:02

## 2023-08-20 RX ADMIN — Medication 25 MCG: at 09:02

## 2023-08-20 RX ADMIN — Medication 2 SPRAY: at 12:40

## 2023-08-20 RX ADMIN — Medication 2 SPRAY: at 16:49

## 2023-08-20 RX ADMIN — Medication 2 SPRAY: at 19:28

## 2023-08-20 RX ADMIN — LORATADINE 10 MG: 10 TABLET ORAL at 09:02

## 2023-08-20 RX ADMIN — Medication 1 CAPSULE: at 19:28

## 2023-08-20 RX ADMIN — GABAPENTIN 100 MG: 100 CAPSULE ORAL at 19:28

## 2023-08-20 RX ADMIN — Medication 2 SPRAY: at 08:59

## 2023-08-20 RX ADMIN — OMEPRAZOLE 20 MG: 20 CAPSULE, DELAYED RELEASE ORAL at 09:02

## 2023-08-20 ASSESSMENT — ACTIVITIES OF DAILY LIVING (ADL)
ADLS_ACUITY_SCORE: 28
ADLS_ACUITY_SCORE: 29
LAUNDRY: WITH SUPERVISION
HYGIENE/GROOMING: INDEPENDENT
HYGIENE/GROOMING: INDEPENDENT
ADLS_ACUITY_SCORE: 28
ADLS_ACUITY_SCORE: 28
ADLS_ACUITY_SCORE: 29
ADLS_ACUITY_SCORE: 28
DRESS: INDEPENDENT
DRESS: INDEPENDENT
LAUNDRY: WITH SUPERVISION
ORAL_HYGIENE: INDEPENDENT
ADLS_ACUITY_SCORE: 29
ORAL_HYGIENE: INDEPENDENT
ADLS_ACUITY_SCORE: 28
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
## 2023-08-20 NOTE — PLAN OF CARE
Problem: Sleep Disturbance  Goal: Adequate Sleep/Rest  Outcome: Progressing   Goal Outcome Evaluation:  Patient was a new admit as of late last evening. No acute events during the night. No complaints of pain. No requests for prn's. Appears to have slept for 7 hours. Routine safety checks in place.

## 2023-08-20 NOTE — CONSULTS
Brief Medicine Consultation Note    Patient transferred from medical unit. Please refer to most recent medicine note, discharge summary on 8/19/23 dated 8/19/23, which was reviewed. Vitals, recent labwork from 8/18, and home medications also reviewed.       Diagnoses:   Intentional overdose -amlodipine, BuSpar, duloxetine, Xanax, omeprazole, atorvastatin, trazodone, Benadryl, NyQuil. -Stabilized on medicine.  Major depressive disorder  NIDHI on CKD, acute component resolved  RCC status post right nephrectomy  Hypokalemia, resolved  QTc prolongation (481 on 8/15/23)  Recent fall, shortly after overdose, without LOC or impact to head  Fibromyalgia  Diarrhea  Insomnia  Hypertension - agree with continuation of PTA amlodipine  Dyslipidemia - agree with continuing PTA atorvastatin  Seasonal allergies  Reflux - continue PPI as ordered  Vitamin D deficiency - continue supplementation as ordered    No further recommendations at this time.   Please page medicine with any concerns.      Shanae Bishop PA-C  [facility] Internal Medicine LOS  8/20/2023 9:50 AM
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
## 2023-08-20 NOTE — PHARMACY-ADMISSION MEDICATION HISTORY
"Admission Medication History Completed by Pharmacy    Please refer to pharmacy progress note on 8/15/23, \"Medication Scribe-Admission Medication History\" under previous encounter at [facility] for information regarding prior to admission medications.    Gracie Raymond, PharmD    "

## 2023-08-20 NOTE — PLAN OF CARE
Problem: Adult Behavioral Health Plan of Care  Goal: Plan of Care Review  Outcome: Progressing  Flowsheets (Taken 8/20/2023 1700)  Patient Agreement with Plan of Care: agrees     Problem: Anxiety  Goal: Anxiety Reduction or Resolution  Outcome: Progressing     Problem: Depression  Goal: Improved Mood  Outcome: Progressing   Goal Outcome Evaluation:    Plan of Care Reviewed With: patient      Patient stayed in the milieu for the most part.  She  presented an affect that was flat,but bright upon approach. She denied feeling suicidal and endorsed mild anxiety with no intervention necessary. She attended and participated in all unit group activities and  told the writer, she does not belong here. Mentation was clear and insight of surroundings and situations was good. She denied pain and was compliant with medications and ADLs. Her systolic BP was mildly elevated on initially check, but okayed upon reassess. Appetite was good and patient expressed needs appropriately and clearly. Will continue to monitor and provide support.

## 2023-08-20 NOTE — PLAN OF CARE
Pt has been visible in the milieu for most of the shift, she is selectively social and states that she is becoming more comfortable on the unit as she gets to know people better. She joined peers for [employer] activity this afternoon. Pt presents with a flat affect but brightens upon approach. She currently denies having any suicidal ideation, endorses anxiety and depression rated 6/10. Pt denies AH/VH and SIB. She is med compliant without issue, no PRNs given. She verbalized to writer that she felt as though the IOP she went to was helpful for her and she hopes to do something similar following discharge. Sister and son updated on pt status, JAQUI in chart. No other concerns at this time.    Problem: Suicidal Behavior  Goal: Suicidal Behavior is Absent or Managed  Outcome: Progressing   Goal Outcome Evaluation:    Plan of Care Reviewed With: patient

## 2023-08-20 NOTE — CARE PLAN
08/19/23 2234   Patient Belongings   Patient Belongings locker   Patient Belongings Put in Hospital Secure Location (Security or Locker, etc.) cell phone/electronics;clothing;suitcase;shoes   Belongings Search Yes   Clothing Search Yes   Second Staff Viktoria Ferrari       Patient Belongings:    1 blanket, 1 black sweater, 3 shirts, 2 pants, 1 jeans, 1 small green purse, 8 underwear, 2 bra, 2 flynn, 1 pen, toiletries, 1 purple suitcase, 1 Ipad, 1 ,  1 dry blower, 1 magazine, 1 pair of socks, 1 watch, 1 phone, 1 pair of shoes, 1 vase with flowers     A               Admission:  I am responsible for any personal items that are not sent to the safe or pharmacy.  [facility] is not responsible for loss, theft or damage of any property in my possession.    Signature:  _________________________________ Date: _______  Time: _____                                              Staff Signature:  ____________________________ Date: ________  Time: _____      2nd Staff person, if patient is unable/unwilling to sign:    Signature: ________________________________ Date: ________  Time: _____     Discharge:  [facility] has returned all of my personal belongings:    Signature: _________________________________ Date: ________  Time: _____                                          Staff Signature:  ____________________________ Date: ________  Time: _____

## 2023-08-20 NOTE — PLAN OF CARE
Problem: Adult Behavioral Health Plan of Care  Goal: Plan of Care Review  Outcome: Progressing     Problem: Anxiety  Goal: Anxiety Reduction or Resolution  Outcome: Progressing     Problem: Depression  Goal: Improved Mood  Outcome: Progressing   Goal Outcome Evaluation:    Patient is a 62 years old female present to ED a few days ago  for attempting suicide by intentionally overdosing  on scheduled home medications [amlodipine, duloxetine, xanax, omeprazole, atorvastatin, trazodone, benadryl, THC gummies, 1 vodka tonic, and half a bottle of Nyquil -acetaminophen, doxylamine, dextromethorphan]. Patient has a hx of MDD and has been receiving  treatment. Patient present to the unit at 1915 from the ED. On assessment, patient denies SI/HI/SIB, pain and other psych symptoms. Mood is pleasant and affect is bright and relax. Patient is alert and oriented x4 and approprietly and  willingly answers assessment questions. Patient is med compliant and attends group sessions upon request. BP is elevated  137/108, but patient is calm and polite. BP recheck indicated 139/99. Denies anxiety and depression and no psychotic behavior noted. Patient requests and is given her own clothes per Dr. hernandez.  Patient is overall calm and friendly.  Staff will continue to monitor and encourage.

## 2023-08-21 PROBLEM — N18.2 CHRONIC KIDNEY DISEASE, STAGE 2 (MILD): Chronic | Status: ACTIVE | Noted: 2021-04-26

## 2023-08-21 LAB
ALBUMIN SERPL BCG-MCNC: 3.9 G/DL (ref 3.5–5.2)
ALP SERPL-CCNC: 122 U/L (ref 35–104)
ALT SERPL W P-5'-P-CCNC: 29 U/L (ref 0–50)
ANION GAP SERPL CALCULATED.3IONS-SCNC: 8 MMOL/L (ref 7–15)
AST SERPL W P-5'-P-CCNC: 22 U/L (ref 0–45)
BILIRUB SERPL-MCNC: 0.3 MG/DL
BUN SERPL-MCNC: 19.6 MG/DL (ref 8–23)
CALCIUM SERPL-MCNC: 9.7 MG/DL (ref 8.8–10.2)
CHLORIDE SERPL-SCNC: 107 MMOL/L (ref 98–107)
CREAT SERPL-MCNC: 1.29 MG/DL (ref 0.51–0.95)
DEPRECATED HCO3 PLAS-SCNC: 29 MMOL/L (ref 22–29)
GFR SERPL CREATININE-BSD FRML MDRD: 47 ML/MIN/1.73M2
GLUCOSE SERPL-MCNC: 106 MG/DL (ref 70–99)
POTASSIUM SERPL-SCNC: 3.6 MMOL/L (ref 3.4–5.3)
PROT SERPL-MCNC: 6.7 G/DL (ref 6.4–8.3)
SODIUM SERPL-SCNC: 144 MMOL/L (ref 136–145)

## 2023-08-21 PROCEDURE — 99232 SBSQ HOSP IP/OBS MODERATE 35: CPT | Mod: GC | Performed by: PSYCHIATRY & NEUROLOGY

## 2023-08-21 PROCEDURE — 93010 ELECTROCARDIOGRAM REPORT: CPT | Performed by: INTERNAL MEDICINE

## 2023-08-21 PROCEDURE — 90832 PSYTX W PT 30 MINUTES: CPT

## 2023-08-21 PROCEDURE — 93005 ELECTROCARDIOGRAM TRACING: CPT

## 2023-08-21 PROCEDURE — H2032 ACTIVITY THERAPY, PER 15 MIN: HCPCS

## 2023-08-21 PROCEDURE — 82306 VITAMIN D 25 HYDROXY: CPT

## 2023-08-21 PROCEDURE — G0177 OPPS/PHP; TRAIN & EDUC SERV: HCPCS

## 2023-08-21 PROCEDURE — 250N000013 HC RX MED GY IP 250 OP 250 PS 637

## 2023-08-21 PROCEDURE — 124N000002 HC R&B MH UMMC

## 2023-08-21 PROCEDURE — 80053 COMPREHEN METABOLIC PANEL: CPT

## 2023-08-21 PROCEDURE — 36415 COLL VENOUS BLD VENIPUNCTURE: CPT

## 2023-08-21 RX ORDER — PANTOPRAZOLE SODIUM 40 MG/1
40 TABLET, DELAYED RELEASE ORAL
Status: DISCONTINUED | OUTPATIENT
Start: 2023-08-22 | End: 2023-08-24 | Stop reason: HOSPADM

## 2023-08-21 RX ORDER — GABAPENTIN 300 MG/1
300 CAPSULE ORAL 2 TIMES DAILY
Status: DISCONTINUED | OUTPATIENT
Start: 2023-08-21 | End: 2023-08-24 | Stop reason: HOSPADM

## 2023-08-21 RX ORDER — VENLAFAXINE 37.5 MG/1
37.5 TABLET ORAL 2 TIMES DAILY WITH MEALS
Status: CANCELLED | OUTPATIENT
Start: 2023-08-21

## 2023-08-21 RX ORDER — VENLAFAXINE HYDROCHLORIDE 37.5 MG/1
37.5 CAPSULE, EXTENDED RELEASE ORAL
Status: COMPLETED | OUTPATIENT
Start: 2023-08-21 | End: 2023-08-21

## 2023-08-21 RX ORDER — VENLAFAXINE HYDROCHLORIDE 37.5 MG/1
37.5 CAPSULE, EXTENDED RELEASE ORAL
Status: DISCONTINUED | OUTPATIENT
Start: 2023-08-22 | End: 2023-08-23

## 2023-08-21 RX ADMIN — ATORVASTATIN CALCIUM 20 MG: 20 TABLET, FILM COATED ORAL at 08:20

## 2023-08-21 RX ADMIN — GABAPENTIN 100 MG: 100 CAPSULE ORAL at 08:39

## 2023-08-21 RX ADMIN — MIRTAZAPINE 7.5 MG: 7.5 TABLET, FILM COATED ORAL at 21:06

## 2023-08-21 RX ADMIN — Medication 25 MCG: at 08:20

## 2023-08-21 RX ADMIN — AMLODIPINE BESYLATE 5 MG: 5 TABLET ORAL at 08:20

## 2023-08-21 RX ADMIN — VENLAFAXINE HYDROCHLORIDE 37.5 MG: 37.5 CAPSULE, EXTENDED RELEASE ORAL at 17:36

## 2023-08-21 RX ADMIN — Medication 2 SPRAY: at 21:06

## 2023-08-21 RX ADMIN — Medication 2 SPRAY: at 08:20

## 2023-08-21 RX ADMIN — LORATADINE 10 MG: 10 TABLET ORAL at 08:20

## 2023-08-21 RX ADMIN — Medication 2 SPRAY: at 12:30

## 2023-08-21 RX ADMIN — LIDOCAINE PATCH 4% 2 PATCH: 40 PATCH TOPICAL at 09:33

## 2023-08-21 RX ADMIN — GABAPENTIN 300 MG: 300 CAPSULE ORAL at 21:06

## 2023-08-21 RX ADMIN — Medication 1 CAPSULE: at 08:20

## 2023-08-21 RX ADMIN — Medication 1 CAPSULE: at 21:06

## 2023-08-21 RX ADMIN — Medication 2 SPRAY: at 17:36

## 2023-08-21 RX ADMIN — CONJUGATED ESTROGENS AND MEDROXYPROGESTERONE ACETATE 1 TABLET: .3; 1.5 TABLET, SUGAR COATED ORAL at 08:39

## 2023-08-21 RX ADMIN — OMEPRAZOLE 20 MG: 20 CAPSULE, DELAYED RELEASE ORAL at 08:20

## 2023-08-21 ASSESSMENT — ACTIVITIES OF DAILY LIVING (ADL)
ADLS_ACUITY_SCORE: 29
LAUNDRY: WITH SUPERVISION
ADLS_ACUITY_SCORE: 29
ORAL_HYGIENE: INDEPENDENT
ADLS_ACUITY_SCORE: 29
ORAL_HYGIENE: INDEPENDENT
ADLS_ACUITY_SCORE: 29
DRESS: INDEPENDENT
DRESS: INDEPENDENT
ADLS_ACUITY_SCORE: 29
HYGIENE/GROOMING: INDEPENDENT
HYGIENE/GROOMING: INDEPENDENT
ADLS_ACUITY_SCORE: 29

## 2023-08-21 NOTE — PROGRESS NOTES
----------------------------------------------------------------------------------------------------------  [facility]  Psychiatry Progress Note  Hospital Day #2     Interim History:     The patient's care was discussed with the treatment team and chart notes were reviewed.    Vitals: VSS over weekend: bp up to 133/90, p 91, resp 20 came down to 127/89, p 78, resp 16 overnight last night  Sleep: 7 hours (08/21/23 0615)  Scheduled medications: Took all scheduled medications as prescribed  Psychiatric PRN medications: No psychiatric prns given    Staff Report:   No acute events or safety concerns overnight.  Please see staff notes for details. Pt attended groups, endorsed anxiety and depression rated 6/10. She verbalized to writer that she felt as though the IOP she went to was helpful for her and she hopes to do something similar following discharge. Sister and son updated on pt status, JAQUI in chart.  She denied feeling suicidal. In groups, she noted that she wants to improve her relationship with her daughter. Medicine is following.     In summary, Nancy Montemayor is a 62 year old female with previous psychiatric diagnoses of MDD, PTSD admitted from the medical unit on 08/19/2023 s/p suicide attempt by polypharmacy overdose in the setting of worsening depression, social and financial stressors.      Subjective:     Patient Interview:  Nancy Montemayor shared that the weekend was very quiet, nothing really was happening on the unit and that she was looking forward to groups and therapy sessions which she found helpful in the past at the 55+ program. Team shared that there will be groups this week with therapy and possible one on one therapy sessions available as well. Also updated pt on possibility of discharge to the 55 plus program when she is ready which she was agreeable to. Pt felt that her mood has not changed much, still sad inside, as she has not be able to work on  "herself in therapy yet. Is agreeable to starting a new antidepressant once her EKG and labs come back. Will let the team know if the lidocaine patches help. Feels her fibromyalgia is a little better. Notes having dry mouth for some time but the artificial saliva spray is helping with this. Notes that her tremor is improving. Team suggested a family meeting with pt's brother to discuss her stress around care taking for their mother in a nursing home. Pt declined at this time but agreed to speak with brother letting him know \"the doctor's orders\" to reduce stress by cooking for their mom a couple times a week instead of everyday.     Phone call with outpatient psychiatrist:  Called  and asked for Dr. Nayak. Gave number for call back. No call back received as of 1620.     ROS:  Patient has  back pain treated with lidocaine patches and tremors that are improving. Noted dry mouth.   Patient denies acute concerns     Objective:     Vitals:  /89   Pulse 78   Temp 97.5  F (36.4  C) (Oral)   Resp 16   Ht 1.651 m (5' 5\")   Wt 83.6 kg (184 lb 6.4 oz)   SpO2 98%   BMI 30.69 kg/m      Allergies:  No Known Allergies    Current Medications:  Scheduled:  Current Facility-Administered Medications   Medication Dose Route Frequency    amLODIPine  5 mg Oral Daily    [Held by provider] ARIPiprazole  2 mg Oral Daily    artificial saliva  2 spray Swish & Spit 4x Daily    atorvastatin  20 mg Oral Daily    estrogen conj-medroxyPROGESTERone  1 tablet Oral Daily    gabapentin  100 mg Oral BID    lactobacillus rhamnosus (GG)  1 capsule Oral BID    lidocaine  2 patch Transdermal Q24H    loratadine  10 mg Oral Daily    mirtazapine  7.5 mg Oral At Bedtime    omeprazole  20 mg Oral Daily    Vitamin D3  25 mcg Oral Daily       PRN:  Current Facility-Administered Medications   Medication Dose Route Frequency    acetaminophen  650 mg Oral Q6H PRN    LORazepam  1 mg Oral Daily PRN    melatonin  1 mg Oral At Bedtime PRN " "      Labs and Imaging:  New results:   No results found for this or any previous visit (from the past 24 hour(s)).    Data this admission:  - CBC unremarkable  - CMP notable for K of 2.9, CO2 20, Cr 1.48, GFR 40, Alk phos 118, protein total 6.1, glucose of 144 on 8/15 which continued to largely improve on medicine with labs on psych admission 8/21 showing: K of 3.6 wnl, CO2 29 wnl, Cr 1.29, GFR 47, Alk phos 122 (107 on 8/18), total protein 6.7 wnl, and glucose of 106.   - TSH normal  - UDS positive for amphetamines, benzo's, and cannabinoids   - Vit D pending  - Hgb A1c elevated 5.9  - Lipids borderline elevated: , non   - Vit B12 normal  - Folate not ordered  - EKG normal sinus rhythm, QTc 481 on 8/15, repeat Qtc 440 on 8/21.      Mental Status Exam:     Oriented to:  Grossly Oriented  General:  Awake and Alert, met with team in the conference room and greeted everyone cheerfully  Appearance:  appears older than stated age and Grooming is adequate  Behavior/Attitude:  calm, cooperative, and engaged  Eye Contact:  appropriate  Psychomotor: normal slight bilateral tremor noted no catatonia present  Speech:  appropriate volume/tone  Language: Fluent in English with appropriate syntax and vocabulary.  Mood:  \"Sad\"  Affect:   incongruent with mood at first , slight lip trembling when discussing family and feeling sad  Thought Process:  linear, coherent, and goal directed  Thought Content:  No SI/HI/AH/VH and wanting to engage with treatment plan ; No apparent delusions  Associations:  intact  Insight:  good due to identifying stressors in her life that contributed to her SI, willingness to work on those stressors.   Judgment:  good due to engaging in treatment plan, seeking therapy  Impulse control: fair  Attention Span:  grossly intact  Concentration:  grossly intact  Recent and Remote Memory:  grossly intact  Fund of Knowledge:  average  Muscle Strength and Tone: normal  Gait and Station: Normal     " Psychiatric Assessment     Nancy Montemayor is a 62 year old female with previous psychiatric diagnoses of MDD, PTSD admitted from the medical unit on 08/19/2023 s/p suicide attempt by polypharmacy overdose in the setting of worsening depression, social and financial stressors. This is her first psychiatric hospitalization. Significant symptoms on admission include medical sequelae s/p polypharmacy ingestion, depressed mood. The MSE on admission was pertinent for depressed mood, intermittent incongruent affect. Biological contributions to mental health presentation include hx of depression, fibromyalgia. Psychological contributions to mental health presentation include adequate insight into symptoms, but unclear coping skills. Social factors contributing to mental health presentation include strained relationship with daughter, ex-, financial stress in the setting of unemployment, and family responsibility caring for her elderly parents. Protective factors include very strong family support in siblings and friends.      In summary, the patient's reported symptoms of depression, past SI in the context of s/p suicide attempt by polypharmacy overdose are consistent with MDD, severe. She will likely benefit from medication management and stabilization this admission.     Given that she currently has s/p suicide attempt, patient warrants inpatient psychiatric hospitalization to maintain her safety.      Psychiatric Plan by Diagnosis      Today's changes:  - Start Effexor 37.5 mg once daily  - Increased Gabapentin to 300 mg BID   - referral for 55+ plus outpatient program     # MDD, s/p suicide attempt by overdose  1. Medications:  - mirtazapine 7.5mg at bedtime   - discuss legacy of suicide as potential protective factor   - Start Effexor 37.5 mg once daily given improvement in Qtc  - referral for 55+ plus outpatient program     2. Pertinent Labs/Monitoring:   - Qtc initially elevated upon admission to medicine s/p  overdose, retake showed Qtc 440 on 8/21     3. Additional Plans:  - Patient will be treated in therapeutic milieu with appropriate individual and group therapies as described     # PTSD  - Individual therapy available while on the unit     Psychiatric Hospital Course:      Nancy Montemayor was admitted to [facility] as a voluntary patient as a transfer from medicine s/p treatment for  polypharmacy overdose after suicide attempt.   Medications:  PTA amlodipine, atorvastatin, loratidine, omeprazole, prempro, and vitamin D were continued.   PTA abilify was held due to unclear if pt taking   PTA xanax, wellbutrin and trazodone were discontinued while admitted to medicine due to not previously helpful and/or high overdose potential.   New medications started by consult psychiatry include: remeron 7.5mg at bedtime   Start Effexor 37.5 mg once daily on 8/21 given improvement in Qtc.  On 8/21, put in referral for 55+ plus outpatient program.      The risks, benefits, alternatives, and side effects were discussed and understood by the patient.     Medical Assessment and Plan   Medical diagnoses to be addressed this admission:    # NIDHI on CKD - improving   # hypokalemia - resolved   # Qtc prolongation (481) - resolved   - considered 2/2 medication overdose, electrolytes replaced, labs improved prior to transfer to inpatient psych  - repeat CMP q48 hr per medicine (Improved Cr 1.29 and GFR 47 on 8/21)  - Repeat EKG on 8/21 showed Qtc of 440      #HTN/HLD:   - PTA amlodipine  - PTA atorvastatin      # Fibromyalgia:  - gabapentin 100mg BID started on medicine, increased to 300 mg BID on 8/21  -  medicine recommended considering OMT or integrative medicine referral in context of recent overdose on home prescription medication     # Chronic diarrhea:  - probiotics (culturell) started on medicine      # Insomnia:  - melatonin PRN started on medicine      # GERD:  - PTA omeprazole      # Seasonal allergies:  - PTA loratidine       #Post-menopausal HRT:   - PTA prempro      # Preexisting tremor  # Hx RCC s/p right nephrectomy 2020     Medical course: Admitted to medicine s/p overdose attempt by polypharmacy ingestion including amlodipine (estimates 20 x 5mg), buspar (10 x 10mg), duloxetine, xanax, omeprazole, atorvastatin, trazodone, benadryl, THC gummies and 1 vodka tonic.  Treated w/calcium gluconate for amlodipine toxicity, electrolyte replacement, consulted poison control. Developed NIDHI, word finding difficulties, and balance issues w/ significantly worsened tremor in hands. Sx peaked on HD 1, attributed to wellbutrin overdose. Home medications restarted. After appropriate treatment, patient was medically stabilized prior to being transferred to the unit and was found to be appropriate for admission.      Consults:  medicine, transfer from medicine      Checklist     Legal Status: Voluntary     Safety Assessment:   Behavioral Orders   Procedures    Code 1 - Restrict to Unit    Routine Programming     As clinically indicated    Status 15     Every 15 minutes.    Suicide precautions     Patients on Suicide Precautions should have a Combination Diet ordered that includes a Diet selection(s) AND a Behavioral Tray selection for Safe Tray - with utensils, or Safe Tray - NO utensils         Risk Assessment:  Risk for harm is moderate-high.  Risk factors: SI, impulsive, past behaviors, and chronic pain  Protective factors: family, engaged in treatment, and open to medication      SIO: no    Disposition: TBD. Disposition pending clinical stabilization, medication optimization and development of an appropriate discharge plan. Plan for 55+ plus outpatient program.      Attestations     Renu Osborne MD  Psychiatry Resident Physician    Attestation:  This patient has been seen and evaluated by me, Janet Shaw MD.  I have discussed this patient with the house staff team including the resident and medical student and I agree with the findings  and plan in this note.    I have reviewed today's vital signs, medications, labs and imaging. Janet Shaw MD , PhD.

## 2023-08-21 NOTE — CONSULTS
This IP consult was completed by me on 8/17/23. Currently, patient is on the wait list for 55 plus at programmatic care.

## 2023-08-21 NOTE — PLAN OF CARE
Pt has been visible in the milieu, she attends all groups offered and is selectively social with peers. Pt verbalizes feeling mildly frustrated that the groups offered here are not more similar to those in the 55+ IOP program she had attended in the past. She is pleasant upon approach and her affect has brightened. She endorses lower levels of anxiety and depression compared to previous days, denies any suicidal ideation. Pt is med compliant without issue, no PRNs given.     Nancy's sister Gail was called and updated on pt status. She expressed feeling concerned with Nancy being able to keep herself safe at home. Either Gail or Nancy's other sister, Kavita would like to be updated by the team tomorrow regarding discharge plans.    Problem: Anxiety  Goal: Anxiety Reduction or Resolution  Outcome: Progressing   Goal Outcome Evaluation:    Plan of Care Reviewed With: patient

## 2023-08-21 NOTE — PLAN OF CARE
"  Duration: Met with patient a for a total of 30 minutes.    Time start: 1205  Time end 1235    Modality Used:CBT, Rapport Building, and Solution Focused    Pt progress: Pt stated she is doing \"good\" today, but that the weekend was slow and Pt was hoping for more therapeutic programming. Pt reports that 90 day intensive treatment was helpful in the past and would like to do another 90 day program. Discussed financial and social stressors. Pt shared that she felt pressure from her brother to visit parents in their nursing home; time commitment was \"too much\" and plans to have a family meeting after she is discharged. Pt shared that her daughter called from a business trip to [place] to check on her which made Pt feel good. Pt worried about finding a job that pays enough ($30/hr+) but does not want to work nights/weekends. Pt plans to sell condo and rent apartment to cut costs, will consider leasing cheaper vehicle and cutting back on streaming services. Discussed life stressors as contributor to depression and ways to control/change situation. Pt appeared ready to make changes. Pt shared she is glad to be alive.    Mental Status Exam:   Attitude: cooperative  Eye Contact: good  Mood: better and good  Affect: appropriate and in normal range and intensity is normal  Speech: clear, coherent  Psychomotor Behavior: no evidence of tardive dyskinesia, dystonia, or tics  Thought Process:  logical  Associations: no loose associations  Thought Content: no evidence of suicidal ideation or homicidal ideation  Insight: good  Judgement: fair  Attention Span and Concentration: intact      Treatment Objective(s) Addressed:   The focus of this session was on rapport building, identifying an appropriate aftercare plan, and identifying treatment goals     Progress Towards Goals:   Unable to assess progress towards goals - first meeting with Pt.      Therapeutic Intervention(s):   Provided active listening, unconditional positive regard, " and validation. Engaged in cognitive restructuring/ reframing, looked at common cognitive distortions and challenged negative thoughts. Engaged in behavioral activation, problem solving any barriers and acknowledging successes, as well as setting new goals.    Plan/next step: Writer will remain available for 1:1 sessions as requested and encouraged Pt to attend group therapy sessions.

## 2023-08-21 NOTE — PLAN OF CARE
Problem: Sleep Disturbance  Goal: Adequate Sleep/Rest  Outcome: Progressing   Goal Outcome Evaluation:  Patient appears to have slept for 7 hours. No new concerns during the night. NO requests for prn's. Respiration calm and non-labored during routine safety checks.
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
## 2023-08-21 NOTE — CARE PLAN
Occupational Therapy Group Note:     08/21/23 2307   Group Therapy Session   Group Attendance attended group session   Time Session Began 1415   Time Session Ended 1500   Total Time (minutes) 45   Total # Attendees 2   Group Type psychoeducation   Group Topic Covered relaxation techniques   Group Session Detail OT Relaxation Group   Patient Response/Contribution confronted peers appropriately;cooperative with task;listened actively;organized   Patient Participation Detail Patient actively participated in a progressive muscle relaxation group in order to promote: positive relaxation and coping skills, encourage insight and self-reflection, promote a positive change, manage behaviors, and improve focus. In addition, patient was guided through proper deep breathing techniques and gentle full body movements/stretches to further promote relaxation. Patient was receptive and willing to engage in group this date. Patient appeared to be actively listening to verbal instructions throughout task, and demonstrated good understanding of material being presented. Patient was actively engaging in all components of group. Patient reported a current relaxation activity she utilizes is: sleeping. Patient reported feeling stressed at the beginning of group; however, feeling more relaxed following PMR exercises and deep breathing. Patient reported need to take a nap at conclusion of group. Affect: congruent. Mood: calm, cooperative, pleasant.
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
## 2023-08-21 NOTE — CARE PLAN
Occupational Therapy Group Note:     08/21/23 1650   Group Therapy Session   Group Attendance attended group session   Time Session Began 1115   Time Session Ended 1200   Total Time (minutes) 45   Total # Attendees 5   Group Type recreation   Group Topic Covered leisure exploration/use of leisure time;structured socialization   Group Session Detail OT Leisure Group: Family Feud   Patient Response/Contribution confronted peers appropriately;cooperative with task;listened actively;organized   Patient Participation Detail Patient engaged in group leisure activity with the focus being: building interpersonal skills, encouraging team collaboration, and promoting overall prosocial engagement and interaction. Patient was willing and agreeable to work in a team dynamic for this particular activity. Patient appeared comfortable interacting with peers during activity. Patient demonstrated patience when working with a team mate with high energy. Patient provided appropriate and clever responses to game prompts in collaboration with team member. Patient demonstrated ability to complete basic mathematical calculations independently. Affect: congruent. Mood: calm, cooperative, pleasant.

## 2023-08-21 NOTE — CARE PLAN
Occupational Therapy Group Note:     08/21/23 1359   Group Therapy Session   Group Attendance attended group session   Time Session Began 1015   Time Session Ended 1105   Total Time (minutes) 35 (no charge)   Total # Attendees 5   Group Type psychoeducation   Group Topic Covered balanced lifestyle;structured socialization   Group Session Detail OT Group: Music and Movement Bingo   Patient Response/Contribution confronted peers appropriately;cooperative with task;listened actively;organized   Patient Participation Detail Patient actively engaged in a music and movement bingo therapeutic activity. Therapeutic benefits of engagement include: promotion of physical exercise to improve overall physical and emotional wellbeing, relaxation of mind and body, and encouragement to socially engage, reminisce, and interact with others. Patient was a calm, pleasant, and cooperative participant in group this date. Patient actively listened to instructions of activity and was able to engage independently. Patient demonstrated good attention and focus on task. Patient was able to recall and verbalize song titles and artist names throughout activity. Patient was noted to be gently singing along to familiar songs. Patient actively participated in 100% of physical movements/exercises following verbal and physical demonstration from writer. Socially appropriate when interacting with peers. Affect: congruent.

## 2023-08-22 LAB
ATRIAL RATE - MUSE: 91 BPM
DEPRECATED CALCIDIOL+CALCIFEROL SERPL-MC: 45 UG/L (ref 20–75)
DIASTOLIC BLOOD PRESSURE - MUSE: NORMAL MMHG
INTERPRETATION ECG - MUSE: NORMAL
P AXIS - MUSE: 35 DEGREES
PR INTERVAL - MUSE: 170 MS
QRS DURATION - MUSE: 80 MS
QT - MUSE: 358 MS
QTC - MUSE: 440 MS
R AXIS - MUSE: -16 DEGREES
SYSTOLIC BLOOD PRESSURE - MUSE: NORMAL MMHG
T AXIS - MUSE: 25 DEGREES
VENTRICULAR RATE- MUSE: 91 BPM

## 2023-08-22 PROCEDURE — 250N000013 HC RX MED GY IP 250 OP 250 PS 637: Performed by: PSYCHIATRY & NEUROLOGY

## 2023-08-22 PROCEDURE — 250N000013 HC RX MED GY IP 250 OP 250 PS 637

## 2023-08-22 PROCEDURE — G0177 OPPS/PHP; TRAIN & EDUC SERV: HCPCS

## 2023-08-22 PROCEDURE — H2032 ACTIVITY THERAPY, PER 15 MIN: HCPCS

## 2023-08-22 PROCEDURE — 99232 SBSQ HOSP IP/OBS MODERATE 35: CPT | Mod: GC | Performed by: PSYCHIATRY & NEUROLOGY

## 2023-08-22 PROCEDURE — 124N000002 HC R&B MH UMMC

## 2023-08-22 RX ORDER — POLYETHYLENE GLYCOL 3350 17 G/17G
17 POWDER, FOR SOLUTION ORAL DAILY PRN
Status: DISCONTINUED | OUTPATIENT
Start: 2023-08-22 | End: 2023-08-24 | Stop reason: HOSPADM

## 2023-08-22 RX ORDER — AMOXICILLIN 250 MG
1 CAPSULE ORAL
Status: DISCONTINUED | OUTPATIENT
Start: 2023-08-22 | End: 2023-08-24 | Stop reason: HOSPADM

## 2023-08-22 RX ADMIN — VENLAFAXINE HYDROCHLORIDE 37.5 MG: 37.5 CAPSULE, EXTENDED RELEASE ORAL at 08:20

## 2023-08-22 RX ADMIN — Medication 2 SPRAY: at 14:08

## 2023-08-22 RX ADMIN — GABAPENTIN 300 MG: 300 CAPSULE ORAL at 08:19

## 2023-08-22 RX ADMIN — Medication 2 SPRAY: at 08:21

## 2023-08-22 RX ADMIN — Medication 2 SPRAY: at 21:19

## 2023-08-22 RX ADMIN — ATORVASTATIN CALCIUM 20 MG: 20 TABLET, FILM COATED ORAL at 08:21

## 2023-08-22 RX ADMIN — MIRTAZAPINE 7.5 MG: 7.5 TABLET, FILM COATED ORAL at 21:18

## 2023-08-22 RX ADMIN — GABAPENTIN 300 MG: 300 CAPSULE ORAL at 21:19

## 2023-08-22 RX ADMIN — CONJUGATED ESTROGENS AND MEDROXYPROGESTERONE ACETATE 1 TABLET: .3; 1.5 TABLET, SUGAR COATED ORAL at 08:19

## 2023-08-22 RX ADMIN — AMLODIPINE BESYLATE 5 MG: 5 TABLET ORAL at 08:21

## 2023-08-22 RX ADMIN — ACETAMINOPHEN 650 MG: 325 TABLET, FILM COATED ORAL at 15:56

## 2023-08-22 RX ADMIN — Medication 1 CAPSULE: at 21:19

## 2023-08-22 RX ADMIN — Medication 2 SPRAY: at 15:53

## 2023-08-22 RX ADMIN — Medication 1 CAPSULE: at 08:19

## 2023-08-22 RX ADMIN — LORATADINE 10 MG: 10 TABLET ORAL at 08:20

## 2023-08-22 RX ADMIN — PANTOPRAZOLE SODIUM 40 MG: 40 TABLET, DELAYED RELEASE ORAL at 08:20

## 2023-08-22 RX ADMIN — Medication 25 MCG: at 08:21

## 2023-08-22 RX ADMIN — LIDOCAINE PATCH 4% 2 PATCH: 40 PATCH TOPICAL at 08:20

## 2023-08-22 ASSESSMENT — ACTIVITIES OF DAILY LIVING (ADL)
ADLS_ACUITY_SCORE: 29
DRESS: INDEPENDENT
ADLS_ACUITY_SCORE: 29
HYGIENE/GROOMING: INDEPENDENT
ADLS_ACUITY_SCORE: 29
ADLS_ACUITY_SCORE: 29
DRESS: INDEPENDENT
HYGIENE/GROOMING: INDEPENDENT
ADLS_ACUITY_SCORE: 29
LAUNDRY: WITH SUPERVISION
ORAL_HYGIENE: INDEPENDENT
ADLS_ACUITY_SCORE: 29
ORAL_HYGIENE: INDEPENDENT
LAUNDRY: WITH SUPERVISION
ADLS_ACUITY_SCORE: 29

## 2023-08-22 NOTE — DISCHARGE INSTRUCTIONS
Behavioral Discharge Planning and Instructions    Summary: You were admitted on 8/19/2023  due to Suicide Attempt.  You were treated by Dr. Shaw and discharged on 8/24/2023 from [facility] to Home    Main Diagnosis: Major Depressive Disorder     Health Care Follow-up:   Primary Care appointment: Thursday, August 31, 2023 @ 1:30pm In-person  Provider: Dr. Anisha Cosme  Location: [facility] [employer]  [address] [place] [address] [address], [place] [address]  Phone: [phone]  Fax: [phone]    Date: Tuesday, 9/5/2023 Time: 10:50 am - 12:05 pm  Provider: Akbar Saldana DNP, PA-C  Location: [facility], [employer] [address]  Phone: [phone]  Type: Medication Mgmt - Initial (In-Person)  Patient Instructions  For patients being seen in office, please arrive 15 minutes early to check-in and complete intake paperwork. For patients being seen via telehealth, the provider will email a link to the patient to connect to the video session. Intake paperwork will be emailed prior to the appointment.    Information will be faxed to your outpatient providers to ensure a healthy continuity of care for you.     Attend all scheduled appointments with your outpatient providers. Call at least 24 hours in advance if you need to reschedule an appointment to ensure continued access to your outpatient providers.     Major Treatments, Procedures and Findings:  You were provided with: a psychiatric assessment, medication evaluation and/or management, and milieu management    Symptoms to Report: feeling more aggressive, increased confusion, losing more sleep, mood getting worse, or thoughts of suicide    Early warning signs can include: increased depression or anxiety sleep disturbances increased thoughts or behaviors of suicide or self-harm     Safety and Wellness:  Take all medicines as directed.  Make no changes unless your doctor suggests them.  Follow treatment  recommendations.  Refrain from alcohol and non-prescribed drugs.  Ask your support system to help you reduce your access to items that could harm yourself or others. Items could include:  Firearms  Medicines (both prescribed and over-the-counter)  Knives and other sharp objects  Ropes and like materials  Car keys  If there is a concern for safety, call 911. If there is a concern for safety, call 911.    Resources:   Crisis Intervention: 596.694.9387 or 070-326-2785 (TTY: 348.457.5396).  Call anytime for help.  National [place] on Mental Illness (www.mn.cyril.org): 403.262.2341 or 961-895-2260.  [place] Association for Children's Mental Health (www.mac.org): 357.168.6512.  Alcoholics Anonymous (www.alcoholics-anonymous.org): Check your phone book for your local chapter.  Suicide Awareness Voices of Education (SAVE) (www.save.org): 119-623-FHVO (0495)  National Suicide Prevention Line (www.mentalhealthmn.org): 436-029-XFZD (9166)  Mental Health Consumer/Survivor Network of [place] (www.mhcsn.net): 259.627.9351 or 418-691-9870  Mental Health Association of [place] (www.mentalhealth.org): 696.363.4461 or 888-260-1736  Self- Management and Recovery Training., SMART-- Toll free: 997.533.5279  www.[employer]5 Tech.org       [facility] ([place] on Mental Illness) improves the lives of children and adults with mental illnesses and their families by providing free classes on mental illnesses and support groups for adults with mental illnesses, parents and family members. For more information: Phone: 188.635.2869 Toll free: 1-745-VTPI-HELPS Website: www.namihelBenjamin's Desk.orghttp://www.namihelps.org/      General Medication Instructions:   See your medication sheet(s) for instructions.   Take all medicines as directed.  Make no changes unless your doctor suggests them.   Go to all your doctor visits.  Be sure to have all your required lab tests. This way, your medicines can be refilled on time.  Do not use any drugs not prescribed by  your doctor.  Avoid alcohol.    Advance Directives:   Scanned document on file with [employer]? Minor-N/A  Is document scanned? No. Copy Requested.  Honoring Choices Your Rights Handout: Informed and given  Was more information offered? Pt declined    The Treatment team has appreciated the opportunity to work with you. If you have any questions or concerns about your recent admission, you can contact the unit which can receive your call 24 hours a day, 7 days a week. They will be able to get in touch with a Provider if needed. The unit number is [phone] .

## 2023-08-22 NOTE — PLAN OF CARE
Team Note Due:  Friday    Assessment/Intervention/Current Symtoms and Care Coordination:  Chart review and met with team, discussed pt progress, symptomology, and response to treatment.  Discussed the discharge plan and any potential impediments to discharge.    Discharge Plan or Goal:  Home with day treatment     Barriers to Discharge:  Suicidal ideation      Referral Status:  Pt has been referred to the 55+ program  Pt has requested a referral for new medication provider.      Legal Status:  Voluntary     Contacts:  None      Upcoming Meetings and Dates/Important Information and next steps:  CTC needs to follow up with coordinators regarding referral for new medication provider.

## 2023-08-22 NOTE — PLAN OF CARE
"  Problem: Adult Behavioral Health Plan of Care  Goal: Optimized Coping Skills in Response to Life Stressors  Outcome: Progressing   Goal Outcome Evaluation:    Pt visible in milieu watched t.v. and ate well at meals. Pt voiced concern regarding the cleanliness of her room mate. Pt reports there was liquid all over the bathroom floor yesterday. Said the mess was cleaned up. Pt hopes the behavior from the room mate does not continue as she describes it as \"gross\". Pt encouraged to come to staff with any concerns. Later reported she reminded her room mate to flush the toilet. Pt had further complaints that her underwear was in the bathroom.   Reports chronic pain from fibromyalgia and is particularly bothersome in her hips. Rated pain '3'/10 - pt given scheduled Lidocaine patches and declined other interventions. Pt has notable systolic blood pressure drop this morning. B/p sitting 144/73 & standing 120/75. Denied feeling dizzy or weak - Provider notified. Had c/o constipation x 3 days and when offered prn pt reported having a bm today.  Denied anxiety, depression, no SI/HA and is ally for safety. Pt is attending groups.                       "

## 2023-08-22 NOTE — CARE PLAN
08/22/23 1000   Group Therapy Session   Group Attendance attended group session   Time Session Began 1015   Time Session Ended 1100   Total Time (minutes) 45   Total # Attendees 5-7   Group Type expressive therapy;task skill   Group Topic Covered emotions/expression;coping skills/lifestyle management   Group Session Detail OT Clinic   Patient Response/Contribution cooperative with task;organized   Patient Participation Detail See Note     Pt actively participated in occupational therapy clinic to facilitate coping skill exploration, creative expression within personally meaningful activities, and clinical observation of social, cognitive, and kinesthetic performance skills. Pt response: Independent to initiate, gather materials, sequence, and adjust to workspace demands as needed. Demonstrated good focus, planning, and problem solving for coloring relaxation task. Able to ask for assistance as needed, and appropriately social with peers and staff. Pt will continue to be encouraged to attend groups for further asssesssment and to address goals identified on plan of care.

## 2023-08-22 NOTE — CARE PLAN
08/22/23 1100   Group Therapy Session   Group Attendance attended group session   Time Session Began 1115   Time Session Ended 1215   Total Time (minutes) 60   Total # Attendees 6   Group Type expressive therapy;life skill   Group Topic Covered coping skills/lifestyle management;emotions/expression   Group Session Detail [facility]   Patient Response/Contribution cooperative with task;discussed personal experience with topic   Patient Participation Detail See Note     Pt attended topic group independently and was cooperative.  Pt completed written task independently and was able to demonstrate good insight into current issues.  Pt was social with peers and staff, initiating some conversations.  Pt demonstrated a good understanding of concepts reviewed and how to implement them into daily practice. Pt will continued to be encouraged to attend groups for ongoing assessment and to address goals identified on plan of care.

## 2023-08-22 NOTE — PLAN OF CARE
Problem: Adult Behavioral Health Plan of Care  Goal: Plan of Care Review  Outcome: Progressing  Flowsheets (Taken 8/22/2023 1611)  Patient Agreement with Plan of Care: agrees     Problem: Anxiety  Goal: Anxiety Reduction or Resolution  Outcome: Progressing     Problem: Depression  Goal: Improved Mood  Outcome: Progressing   Goal Outcome Evaluation:    Plan of Care Reviewed With: patient      Patient demonstrates good insight into issues; she is alert and oriented x4; she expresses her needs appropriately and clearly; she interacts well with staff and peers; she complies with medications and ADLs; she initiates conversations and presents her problem logically and clearly; she demonstrates good understanding of unit polices and rules; her affect is bright and friendly and mood is pleasant and calm; she has been in the [facility] area building puzzles with peers;she attends group meetings;   she was receptive and friendly with the sister who visited and did not exhibited any psychotic episodes.She is very approachable.  Will continue to encourage and monitor

## 2023-08-22 NOTE — PROGRESS NOTES
"  ----------------------------------------------------------------------------------------------------------  [facility]  Psychiatry Progress Note  Hospital Day #3     Interim History:     The patient's care was discussed with the treatment team and chart notes were reviewed.    Vitals: VSS over weekend: stable overnight, /90.  Cr 1.29 trending up a little from 1.23 on 8/18 and it 1.19 before that. GFR 47. Alk Phos 122 trending up from 1-7 on 8/18  Sleep: 6.75 hours (08/22/23 0630)  Scheduled medications: Took all scheduled medications as prescribed including Effexor 37.5 mg BID  Psychiatric PRN medications: No psychiatric prns given    Staff Report:   No acute events or safety concerns overnight.  Pt met with therapist 1:1. Nancy's sister Gail was called and updated on pt status. She expressed feeling concerned with Nancy being able to keep herself safe at home. Either Gail or Nancy's other sister, Kavita would like to be updated by the team tomorrow regarding discharge plans. Pt attended groups and was engaged. Pt noted \" My roommate is dirty. She washes her clothes in the sink and throws water all over the floor. I don't know whether its pee or water. I want out of the room. Its not healthy for me.\" Per staff oral report, had relative orthostatic hypotension with systolic 144 standing and 120 sitting.    In summary, Nancy Montemayor is a 62 year old female with previous psychiatric diagnoses of MDD, PTSD admitted from the medical unit on 08/19/2023 s/p suicide attempt by polypharmacy overdose in the setting of worsening depression, social and financial stressors.      Subjective:     Patient Interview:  Nancy Montemayor was seen in the team room,  she says she is still feeling depressed and continues to feel limited by her fibromyalgia but denies suicidal ideation.  She also mentions that it is difficult to fell comfortable in her room as her bathroom is very messy " "due to her roommate.  She says there is urine and water all over the floor as her roommate uses the sink to wash, herself and her clothes.  She was reassured that when a single room was available that she would be transferred.  Pt was interested in restarting the 55+ program. Pt complained of dry mouth.      Phone call with outpatient psychiatrist on 8/21:  Pt's outpatient psychiatrist noted that she as last seen on 7/12 at which time she was doing very well on Cymbalta and had no SI. Was feeling good on her medications. Buspar was stopped in June of this year. Abilify was not helpful as it did not let the patient sleep very well. Pt was on Wellbutrin in the past.     ROS:  Patient has  back pain treated with lidocaine patches and tremors that are improving. Noted dry mouth.   Patient denies acute concerns     Objective:     Vitals:  BP (!) 130/90 (BP Location: Left arm, Patient Position: Sitting, Cuff Size: Adult Regular)   Pulse 96   Temp 98.1  F (36.7  C) (Oral)   Resp 18   Ht 1.651 m (5' 5\")   Wt 83.6 kg (184 lb 6.4 oz)   SpO2 95%   BMI 30.69 kg/m      Allergies:  No Known Allergies    Current Medications:  Scheduled:  Current Facility-Administered Medications   Medication Dose Route Frequency    amLODIPine  5 mg Oral Daily    [Held by provider] ARIPiprazole  2 mg Oral Daily    artificial saliva  2 spray Swish & Spit 4x Daily    atorvastatin  20 mg Oral Daily    estrogen conj-medroxyPROGESTERone  1 tablet Oral Daily    gabapentin  300 mg Oral BID    lactobacillus rhamnosus (GG)  1 capsule Oral BID    lidocaine  2 patch Transdermal Q24H    loratadine  10 mg Oral Daily    mirtazapine  7.5 mg Oral At Bedtime    pantoprazole  40 mg Oral QAM AC    venlafaxine  37.5 mg Oral Daily with breakfast    Vitamin D3  25 mcg Oral Daily       PRN:  Current Facility-Administered Medications   Medication Dose Route Frequency    acetaminophen  650 mg Oral Q6H PRN    LORazepam  1 mg Oral Daily PRN    melatonin  1 mg Oral At " "Bedtime PRN       Labs and Imaging:  New results:   Recent Results (from the past 24 hour(s))   Comprehensive metabolic panel    Collection Time: 08/21/23 10:28 AM   Result Value Ref Range    Sodium 144 136 - 145 mmol/L    Potassium 3.6 3.4 - 5.3 mmol/L    Chloride 107 98 - 107 mmol/L    Carbon Dioxide (CO2) 29 22 - 29 mmol/L    Anion Gap 8 7 - 15 mmol/L    Urea Nitrogen 19.6 8.0 - 23.0 mg/dL    Creatinine 1.29 (H) 0.51 - 0.95 mg/dL    Calcium 9.7 8.8 - 10.2 mg/dL    Glucose 106 (H) 70 - 99 mg/dL    Alkaline Phosphatase 122 (H) 35 - 104 U/L    AST 22 0 - 45 U/L    ALT 29 0 - 50 U/L    Protein Total 6.7 6.4 - 8.3 g/dL    Albumin 3.9 3.5 - 5.2 g/dL    Bilirubin Total 0.3 <=1.2 mg/dL    GFR Estimate 47 (L) >60 mL/min/1.73m2       Data this admission:  - CBC unremarkable  - CMP notable for K of 2.9, CO2 20, Cr 1.48, GFR 40, Alk phos 118, protein total 6.1, glucose of 144 on 8/15 which continued to largely improve on medicine with labs on psych admission 8/21 showing: K of 3.6 wnl, CO2 29 wnl, Cr 1.29, GFR 47, Alk phos 122 (107 on 8/18), total protein 6.7 wnl, and glucose of 106.   - TSH normal  - UDS positive for amphetamines, benzo's, and cannabinoids   - Vit D pending  - Hgb A1c elevated 5.9  - Lipids borderline elevated: , non   - Vit B12 normal  - Folate not ordered  - EKG normal sinus rhythm, QTc 481 on 8/15, repeat Qtc 440 on 8/21.      Mental Status Exam:     Oriented to:  Grossly Oriented  General:  Awake and Alert, met with team in the conference room and greeted everyone cheerfully  Appearance:  appears older than stated age and Grooming is adequate  Behavior/Attitude:  calm, cooperative, and engaged  Eye Contact:  appropriate  Psychomotor: normal slight bilateral tremor noted no catatonia present  Speech:  appropriate volume/tone  Language: Fluent in English with appropriate syntax and vocabulary.  Mood:  \"Sad\"  Affect:   incongruent with mood at first , slight lip trembling when discussing " family and feeling sad  Thought Process:  linear, coherent, and goal directed  Thought Content:  No SI/HI/AH/VH and wanting to engage with treatment plan ; No apparent delusions  Associations:  intact  Insight:  good due to identifying stressors in her life that contributed to her SI, willingness to work on those stressors.   Judgment:  good due to engaging in treatment plan, seeking therapy  Impulse control: fair  Attention Span:  grossly intact  Concentration:  grossly intact  Recent and Remote Memory:  grossly intact  Fund of Knowledge:  average  Muscle Strength and Tone: normal  Gait and Station: Normal     Psychiatric Assessment     Nancy Montemayor is a 62 year old female with previous psychiatric diagnoses of MDD, PTSD admitted from the medical unit on 08/19/2023 s/p suicide attempt by polypharmacy overdose in the setting of worsening depression, social and financial stressors. This is her first psychiatric hospitalization. Significant symptoms on admission include medical sequelae s/p polypharmacy ingestion, depressed mood. The MSE on admission was pertinent for depressed mood, intermittent incongruent affect. Biological contributions to mental health presentation include hx of depression, fibromyalgia. Psychological contributions to mental health presentation include adequate insight into symptoms, but unclear coping skills. Social factors contributing to mental health presentation include strained relationship with daughter, ex-, financial stress in the setting of unemployment, and family responsibility caring for her elderly parents. Protective factors include very strong family support in siblings and friends.      In summary, the patient's reported symptoms of depression, past SI in the context of s/p suicide attempt by polypharmacy overdose are consistent with MDD, severe. She will likely benefit from medication management and stabilization this admission.     Given that she currently has s/p  suicide attempt, patient warrants inpatient psychiatric hospitalization to maintain her safety.      Psychiatric Plan by Diagnosis      Today's changes:  - increase Effexor 37.5 mg daily starting tomorrow       # MDD, s/p suicide attempt by overdose  1. Medications:  - mirtazapine 7.5mg at bedtime   - discuss legacy of suicide as potential protective factor   - Effexor 37.5 mg once daily given improvement in Qtc  - referral for 55+ plus outpatient program     2. Pertinent Labs/Monitoring:   - Qtc initially elevated upon admission to medicine s/p overdose, retake showed Qtc 440 on 8/21     3. Additional Plans:  - Patient will be treated in therapeutic milieu with appropriate individual and group therapies as described     # PTSD  - Individual therapy available while on the unit     Psychiatric Hospital Course:      Nancy Montemayor was admitted to [facility] as a voluntary patient as a transfer from [facility] s/p treatment for  polypharmacy overdose after suicide attempt.   Medications:  PTA amlodipine, atorvastatin, loratidine, omeprazole, prempro, and vitamin D were continued.   PTA abilify was held due to unclear if pt taking   PTA xanax, wellbutrin and trazodone were discontinued while admitted to medicine due to not previously helpful and/or high overdose potential.   New medications started by consult psychiatry include: remeron 7.5mg at bedtime   Start Effexor 37.5 mg once daily on 8/21 given improvement in Qtc.  On 8/21, put in referral for 55+ plus outpatient program.      The risks, benefits, alternatives, and side effects were discussed and understood by the patient.     Medical Assessment and Plan   Medical diagnoses to be addressed this admission:    # NIDHI on CKD - improving   # hypokalemia - resolved   # Qtc prolongation (481) - resolved   - considered 2/2 medication overdose, electrolytes replaced, labs improved prior to transfer to inpatient psych  - repeat CMP q48 hr per medicine (Improved Cr 1.29 and  GFR 47 on 8/21)  - Repeat EKG on 8/21 showed Qtc of 440      #HTN/HLD:   - PTA amlodipine  - PTA atorvastatin      # Fibromyalgia:  - gabapentin 100mg BID started on medicine, increased to 300 mg BID on 8/21  -  medicine recommended considering OMT or integrative medicine referral in context of recent overdose on home prescription medication     # Chronic diarrhea:  - probiotics (culturell) started on medicine      # Insomnia:  - melatonin PRN started on medicine      # GERD:  - PTA omeprazole      # Seasonal allergies:  - PTA loratidine      #Post-menopausal HRT:   - PTA prempro      # Preexisting tremor  # Hx RCC s/p right nephrectomy 2020     Medical course: Admitted to medicine s/p overdose attempt by polypharmacy ingestion including amlodipine (estimates 20 x 5mg), buspar (10 x 10mg), duloxetine, xanax, omeprazole, atorvastatin, trazodone, benadryl, THC gummies and 1 vodka tonic.  Treated w/calcium gluconate for amlodipine toxicity, electrolyte replacement, consulted poison control. Developed NIDHI, word finding difficulties, and balance issues w/ significantly worsened tremor in hands. Sx peaked on HD 1, attributed to wellbutrin overdose. Home medications restarted. After appropriate treatment, patient was medically stabilized prior to being transferred to the unit and was found to be appropriate for admission.      Consults:  medicine, transfer from medicine      Checklist     Legal Status: Voluntary     Safety Assessment:   Behavioral Orders   Procedures    Code 1 - Restrict to Unit    Routine Programming     As clinically indicated    Status 15     Every 15 minutes.    Suicide precautions     Patients on Suicide Precautions should have a Combination Diet ordered that includes a Diet selection(s) AND a Behavioral Tray selection for Safe Tray - with utensils, or Safe Tray - NO utensils         Risk Assessment:  Risk for harm is moderate-high.  Risk factors: SI, impulsive, past behaviors, and chronic  pain  Protective factors: family, engaged in treatment, and open to medication      SIO: no    Disposition: TBD. Disposition pending clinical stabilization, medication optimization and development of an appropriate discharge plan. Plan for 55+ plus outpatient program.      Attestations     Attestation:  This patient has been seen and evaluated by me, Janet Shaw MD.  I have discussed this patient with the house staff team including the resident and medical student and I agree with the findings and plan in this note.    I have reviewed today's vital signs, medications, labs and imaging. Janet Shaw MD , PhD.    Janet Shaw M.D., Ph.D.     Dept. of Psychiatry   [facility]

## 2023-08-22 NOTE — CARE PLAN
08/22/23 1500   Group Therapy Session   Group Attendance attended group session   Time Session Began 0115   Time Session Ended 0215   Total Time (minutes) 60   Total # Attendees 5-7   Group Type life skill;expressive therapy   Group Topic Covered balanced lifestyle;coping skills/lifestyle management;emotions/expression   Group Session Detail In/Out [place] self care   Patient Response/Contribution cooperative with task;discussed personal experience with topic;offered helpful suggestions to peers;listened actively   Patient Participation Detail See Note     Pt attended topic group independently and was cooperative.  Pt completed written task independently and was able to demonstrate good insight into current issues.  Pt was social with peers and staff, initiating some conversations and giving helpful feedback.  Pt demonstrated a good understanding of concepts reviewed and how to implement them into daily practice. Pt will continued to be encouraged to attend groups for ongoing assessment and to address goals identified on plan of care.

## 2023-08-22 NOTE — CARE PLAN
08/21/23 7184   Group Therapy Session   Group Attendance attended group session   Time Session Began 2000   Time Session Ended 2100   Total Time (minutes) 45   Total # Attendees 7   Group Type expressive therapy   Group Topic Covered emotions/expression   Patient Response/Contribution cooperative with task     Art Therapy directive was to create artwork (watercolor painting) in response to a chosen inspirational quote and/or positive affirmation.  Goals of directive: emotional expression, emotional regulation, media exploration  Pt was an engaged participant, focused on task for the full duration of group. Pt finished her artwork and briefly verbally processed with author and group.   Pt chose positive quotes related to having confidence/self-esteem.  Pts mood was calm.

## 2023-08-22 NOTE — PLAN OF CARE
Problem: Sleep Disturbance  Goal: Adequate Sleep/Rest  Outcome: Progressing   Goal Outcome Evaluation:  Patient had no acute issues during the shift. Appeared to be sleeping for majority of the night.slept for a total of 6.75 hours. No noted signs of pain. No requests for prn's. Patient breathing normally during routine safety checks. Remained in own room throughout the night.

## 2023-08-23 LAB
ANION GAP SERPL CALCULATED.3IONS-SCNC: 12 MMOL/L (ref 7–15)
BUN SERPL-MCNC: 24 MG/DL (ref 8–23)
CALCIUM SERPL-MCNC: 9.8 MG/DL (ref 8.8–10.2)
CHLORIDE SERPL-SCNC: 106 MMOL/L (ref 98–107)
CREAT SERPL-MCNC: 1.26 MG/DL (ref 0.51–0.95)
DEPRECATED HCO3 PLAS-SCNC: 26 MMOL/L (ref 22–29)
GFR SERPL CREATININE-BSD FRML MDRD: 48 ML/MIN/1.73M2
GLUCOSE SERPL-MCNC: 124 MG/DL (ref 70–99)
HOLD SPECIMEN: NORMAL
POTASSIUM SERPL-SCNC: 4.3 MMOL/L (ref 3.4–5.3)
SODIUM SERPL-SCNC: 144 MMOL/L (ref 136–145)

## 2023-08-23 PROCEDURE — 80048 BASIC METABOLIC PNL TOTAL CA: CPT

## 2023-08-23 PROCEDURE — G0177 OPPS/PHP; TRAIN & EDUC SERV: HCPCS

## 2023-08-23 PROCEDURE — H2032 ACTIVITY THERAPY, PER 15 MIN: HCPCS

## 2023-08-23 PROCEDURE — 250N000013 HC RX MED GY IP 250 OP 250 PS 637

## 2023-08-23 PROCEDURE — 124N000002 HC R&B MH UMMC

## 2023-08-23 PROCEDURE — 250N000013 HC RX MED GY IP 250 OP 250 PS 637: Performed by: PSYCHIATRY & NEUROLOGY

## 2023-08-23 PROCEDURE — 36415 COLL VENOUS BLD VENIPUNCTURE: CPT

## 2023-08-23 PROCEDURE — 99231 SBSQ HOSP IP/OBS SF/LOW 25: CPT | Mod: GC | Performed by: PSYCHIATRY & NEUROLOGY

## 2023-08-23 RX ORDER — VENLAFAXINE HYDROCHLORIDE 37.5 MG/1
37.5 CAPSULE, EXTENDED RELEASE ORAL ONCE
Status: COMPLETED | OUTPATIENT
Start: 2023-08-23 | End: 2023-08-23

## 2023-08-23 RX ORDER — VENLAFAXINE HYDROCHLORIDE 75 MG/1
75 CAPSULE, EXTENDED RELEASE ORAL
Status: DISCONTINUED | OUTPATIENT
Start: 2023-08-24 | End: 2023-08-24 | Stop reason: HOSPADM

## 2023-08-23 RX ADMIN — Medication 2 SPRAY: at 11:28

## 2023-08-23 RX ADMIN — LORATADINE 10 MG: 10 TABLET ORAL at 08:31

## 2023-08-23 RX ADMIN — ATORVASTATIN CALCIUM 20 MG: 20 TABLET, FILM COATED ORAL at 08:31

## 2023-08-23 RX ADMIN — Medication 1 MG: at 21:07

## 2023-08-23 RX ADMIN — VENLAFAXINE HYDROCHLORIDE 37.5 MG: 37.5 CAPSULE, EXTENDED RELEASE ORAL at 08:31

## 2023-08-23 RX ADMIN — Medication 1 CAPSULE: at 20:07

## 2023-08-23 RX ADMIN — CONJUGATED ESTROGENS AND MEDROXYPROGESTERONE ACETATE 1 TABLET: .3; 1.5 TABLET, SUGAR COATED ORAL at 10:18

## 2023-08-23 RX ADMIN — Medication 25 MCG: at 08:31

## 2023-08-23 RX ADMIN — PANTOPRAZOLE SODIUM 40 MG: 40 TABLET, DELAYED RELEASE ORAL at 08:31

## 2023-08-23 RX ADMIN — Medication 2 SPRAY: at 08:37

## 2023-08-23 RX ADMIN — LIDOCAINE PATCH 4% 2 PATCH: 40 PATCH TOPICAL at 09:15

## 2023-08-23 RX ADMIN — Medication 1 CAPSULE: at 08:31

## 2023-08-23 RX ADMIN — Medication 2 SPRAY: at 20:08

## 2023-08-23 RX ADMIN — MIRTAZAPINE 7.5 MG: 7.5 TABLET, FILM COATED ORAL at 21:07

## 2023-08-23 RX ADMIN — GABAPENTIN 300 MG: 300 CAPSULE ORAL at 20:07

## 2023-08-23 RX ADMIN — VENLAFAXINE HYDROCHLORIDE 37.5 MG: 37.5 CAPSULE, EXTENDED RELEASE ORAL at 10:22

## 2023-08-23 RX ADMIN — AMLODIPINE BESYLATE 5 MG: 5 TABLET ORAL at 08:31

## 2023-08-23 RX ADMIN — Medication 2 SPRAY: at 17:27

## 2023-08-23 RX ADMIN — GABAPENTIN 300 MG: 300 CAPSULE ORAL at 08:31

## 2023-08-23 ASSESSMENT — ACTIVITIES OF DAILY LIVING (ADL)
ORAL_HYGIENE: INDEPENDENT
LAUNDRY: WITH SUPERVISION
DRESS: INDEPENDENT
ADLS_ACUITY_SCORE: 29
ADLS_ACUITY_SCORE: 29
LAUNDRY: WITH SUPERVISION
ORAL_HYGIENE: INDEPENDENT
ADLS_ACUITY_SCORE: 29
HYGIENE/GROOMING: INDEPENDENT
ADLS_ACUITY_SCORE: 29
HYGIENE/GROOMING: INDEPENDENT
ADLS_ACUITY_SCORE: 29
DRESS: INDEPENDENT
ADLS_ACUITY_SCORE: 29

## 2023-08-23 NOTE — PLAN OF CARE
Problem: Sleep Disturbance  Goal: Adequate Sleep/Rest  Outcome: Progressing   Goal Outcome Evaluation:  Patient was observed lying in bed with her eyes closed during 15 minutes safety checks. Non-labored breathing with even chest movements was observed. Patient slept for 7 hours.

## 2023-08-23 NOTE — PLAN OF CARE
Problem: Psychotic Signs/Symptoms  Goal: Optimal Cognitive Function (Psychotic Signs/Symptoms)  Intervention: Support and Promote Cognitive Ability  Recent Flowsheet Documentation  Taken 8/23/2023 0949 by Marianne Martinez RN  Trust Relationship/Rapport:   emotional support provided   empathic listening provided   questions answered   questions encouraged   Goal Outcome Evaluation:    Plan of Care Reviewed With: patient          Patient alert and conversant. She was pleasant on approach. She presented with a bright affect. She c/o bilateral thigh pain 5/10 - scheduled lidocaine patch applied. Reported relief from pain an hour after it was applied. She endorsed mild depression and anxiety. She took a shower early in the morning. She was medication and care compliant. She's eating and drinking well. She's visible in the milieu, social with peers. She also attended groups. No PRN given this shift.

## 2023-08-23 NOTE — PROGRESS NOTES
"  ----------------------------------------------------------------------------------------------------------  [facility]  Psychiatry Progress Note  Hospital Day #4     Interim History:     The patient's care was discussed with the treatment team and chart notes were reviewed.    Vitals: VSS  Sleep: 7 hours (08/23/23 0650)  Scheduled medications: Took all scheduled medications as prescribed  Psychiatric PRN medications: No psychiatric prns given    Staff Report:   No acute events or safety concerns overnight. In summary, patient was appropriately participating in groups, denies. Please see staff notes for details.      Subjective:     Patient Interview:  Nancy Montemayor was seen and evaluated in the interview room. She states, \"I'm feeling really better. I don't have bad thoughts anymore. I'm comfortable here and getting used to people here but I want go home. There are people here leaving sooner than me and I feel like I'm not that bad.\" She reports she feels much more capable than we she first arrive. States her sister came to visit last night and she had a conversation about the distress her brother was causing and states her sister was supportive and agreed that she should only help out with the family to only 2 days of the week not 5 days and  states she should not focus on brother \"guilt tripping\" her on not taking on more days of helping. She states she have plans on going to 55+ program, sell her condo to move into a cheaper apartment to help with the finacial diffiucluties she was experiencing and will try to make it work and do her best.     ROS:  Patient has no bothersome physical symptoms  Patient denies acute concerns     Objective:     Vitals:  BP (!) 137/92   Pulse 98   Temp 97.8  F (36.6  C) (Temporal)   Resp 18   Ht 1.651 m (5' 5\")   Wt 82.9 kg (182 lb 11.2 oz)   SpO2 95%   BMI 30.40 kg/m      Allergies:  No Known Allergies    Current " Medications:  Scheduled:  Current Facility-Administered Medications   Medication Dose Route Frequency    amLODIPine  5 mg Oral Daily    [Held by provider] ARIPiprazole  2 mg Oral Daily    artificial saliva  2 spray Swish & Spit 4x Daily    atorvastatin  20 mg Oral Daily    estrogen conj-medroxyPROGESTERone  1 tablet Oral Daily    gabapentin  300 mg Oral BID    lactobacillus rhamnosus (GG)  1 capsule Oral BID    lidocaine  2 patch Transdermal Q24H    loratadine  10 mg Oral Daily    mirtazapine  7.5 mg Oral At Bedtime    pantoprazole  40 mg Oral QAM AC    venlafaxine  37.5 mg Oral Daily with breakfast    Vitamin D3  25 mcg Oral Daily       PRN:  Current Facility-Administered Medications   Medication Dose Route Frequency    acetaminophen  650 mg Oral Q6H PRN    LORazepam  1 mg Oral Daily PRN    melatonin  1 mg Oral At Bedtime PRN    polyethylene glycol  17 g Oral Daily PRN    senna-docusate  1 tablet Oral At Bedtime PRN       Labs and Imaging:  New results:   No results found for this or any previous visit (from the past 24 hour(s)).    Data this admission:    - CMP notable for K of 2.9, CO2 20, Cr 1.48, GFR 40, Alk phos 118, protein total 6.1, glucose of 144 on 8/15 which continued to largely improve on medicine with labs on psych admission 8/21 showing: K of 3.6 wnl, CO2 29 wnl, Cr 1.29, GFR 47, Alk phos 122 (107 on 8/18), total protein 6.7 wnl, and glucose of 106.   - TSH normal  - UDS positive for amphetamines, benzo's, and cannabinoids   - Vit D pending  - Hgb A1c elevated 5.9  - Lipids borderline elevated: , non   - Vit B12 normal  - Folate not ordered  - EKG normal sinus rhythm, QTc 481 on 8/15, repeat Qtc 440 on 8/21.      Mental Status Exam:     Oriented to:  Grossly Oriented  General:  Awake and Alert  Appearance:  appears stated age, Grooming is adequate, and overweight  Behavior/Attitude:  calm, cooperative, and engaged  Eye Contact:  appropriate  Psychomotor: normal and no evidence of tics,  "dystonia, or tardive dyskinesia no catatonia present  Speech:  appropriate volume/tone  Language: Fluent in English with appropriate syntax and vocabulary.  Mood:  \"much better\"  Affect:  congruent with mood  Thought Process:  linear, coherent, and goal directed  Thought Content:  No SI/HI/AH/VH; No apparent delusions  Associations:  intact  Insight:  good due to abilit to recognize sevrity of mental illness  Judgment:  good due to ability to offer safety plan  Impulse control: good  Attention Span:  grossly intact  Concentration:  grossly intact  Recent and Remote Memory:  not formally assessed  Fund of Knowledge:  average  Muscle Strength and Tone: normal  Gait and Station: Normal     Psychiatric Assessment     Nancy Montemayor is a 62 year old female with previous psychiatric diagnoses of MDD, PTSD admitted from the medical unit on 08/19/2023 s/p suicide attempt by polypharmacy overdose in the setting of worsening depression, social and financial stressors. This is her first psychiatric hospitalization. Significant symptoms on admission include medical sequelae s/p polypharmacy ingestion, depressed mood. The MSE on admission was pertinent for depressed mood, intermittent incongruent affect. Biological contributions to mental health presentation include hx of depression, fibromyalgia. Psychological contributions to mental health presentation include adequate insight into symptoms, but unclear coping skills. Social factors contributing to mental health presentation include strained relationship with daughter, ex-, financial stress in the setting of unemployment, and family responsibility caring for her elderly parents. Protective factors include very strong family support in siblings and friends.      In summary, the patient's reported symptoms of depression, past SI in the context of s/p suicide attempt by polypharmacy overdose are consistent with MDD, severe. She will likely benefit from medication management " and stabilization this admission.     Given that she currently has s/p suicide attempt, patient warrants inpatient psychiatric hospitalization to maintain her safety.      Psychiatric Plan by Diagnosis      Today's changes:  - effexor 75mg po qd     # MDD, s/p suicide attempt by overdose  1. Medications:  - mirtazapine 7.5mg at bedtime   - discuss legacy of suicide as potential protective factor   - Effexor 37.5 mg once daily given improvement in Qtc  - referral for 55+ plus outpatient program     2. Pertinent Labs/Monitoring:   - Qtc initially elevated upon admission to medicine s/p overdose, retake showed Qtc 440 on 8/21     3. Additional Plans:  - Patient will be treated in therapeutic milieu with appropriate individual and group therapies as described     # PTSD  - Individual therapy available while on the unit     Psychiatric Hospital Course:      Nancy Montemayor was admitted to [facility] as a voluntary patient as a transfer from [facility] s/p treatment for  polypharmacy overdose after suicide attempt. The following PTA medications were continued: amlodipine, atorvastatin, loratidine, omeprazole, prempro, and vitamin D. It was unclear if patient was taking Abilify, and it was held. PTA xanax, wellbutrin and trazodone were discontinued while admitted to medicine due to  limited therapeutic benefit and increased risk of overdose with having these medications prescribed. Consult psychiatry started  Mirtazapine 7.5mg po at bedtime to help with symptoms of insomnia, anxiety and depression. Effexor 37.5mg was started to optimize current regimen and was increased to 75mg given patients reports improvement in symptoms and lack of side effects reported. Patient expressed interest in 55+ outpatient program and referral process was started. Patient maintains interest in participating in the program despite longer than expected waiting time.      The risks, benefits, alternatives, and side effects were discussed and  understood by the patient.     Medical Assessment and Plan     Medical diagnoses to be addressed this admission:    # NIDHI on CKD - improving   # hypokalemia - resolved   # Qtc prolongation (481) - resolved   - considered 2/2 medication overdose, electrolytes replaced, labs improved prior to transfer to inpatient psych  - repeat CMP q48 hr per medicine (Improved Cr 1.29 and GFR 47 on 8/21)  - Repeat EKG on 8/21 showed Qtc of 440      #HTN/HLD:   - PTA amlodipine  - PTA atorvastatin      # Fibromyalgia:  - gabapentin 100mg BID started on medicine, increased to 300 mg BID on 8/21  -  medicine recommended considering OMT or integrative medicine referral in context of recent overdose on home prescription medication     # Chronic diarrhea:  - probiotics (culturell) started on medicine      # Insomnia:  - melatonin PRN started on medicine      # GERD:  - PTA omeprazole      # Seasonal allergies:  - PTA loratidine      #Post-menopausal HRT:   - PTA prempro      # Preexisting tremor  # Hx RCC s/p right nephrectomy 2020     Medical course: Admitted to medicine s/p overdose attempt by polypharmacy ingestion including amlodipine (estimates 20 x 5mg), buspar (10 x 10mg), duloxetine, xanax, omeprazole, atorvastatin, trazodone, benadryl, THC gummies and 1 vodka tonic.  Treated w/calcium gluconate for amlodipine toxicity, electrolyte replacement, consulted poison control. Developed NIDHI, word finding difficulties, and balance issues w/ significantly worsened tremor in hands. Sx peaked on HD 1, attributed to wellbutrin overdose. Home medications restarted. After appropriate treatment, patient was medically stabilized prior to being transferred to the unit and was found to be appropriate for admission.      Consults:  medicine, transfer from medicine      Checklist     Legal Status: Voluntary     Safety Assessment:   Behavioral Orders   Procedures    Code 1 - Restrict to Unit    Routine Programming     As clinically indicated     Status 15     Every 15 minutes.    Suicide precautions     Patients on Suicide Precautions should have a Combination Diet ordered that includes a Diet selection(s) AND a Behavioral Tray selection for Safe Tray - with utensils, or Safe Tray - NO utensils         Risk Assessment:  Risk for harm is moderate-high.  Risk factors: SI, impulsive, past behaviors, and chronic medical issues  Protective factors: engaged in treatment     SIO: no    Disposition:TBD. Disposition pending clinical stabilization, medication optimization and development of an appropriate discharge plan. Plan for 55+ plus outpatient program.      Attestations     Patient seen and discussed with attending physician, Dr. Janet Shaw who is in agreement with my assessment and plan.    Matti Bishop D.O.  Psychiatry Resident  [facility]     Attestation:  This patient has been seen and evaluated by me, Janet Shaw MD.  I have discussed this patient with the house staff team including the resident and medical student and I agree with the findings and plan in this note.    I have reviewed today's vital signs, medications, labs and imaging. Janet Shaw MD , PhD.

## 2023-08-23 NOTE — PROGRESS NOTES
08/22/23 2100   Group Therapy Session   Time Session Began 2000   Time Session Ended 2100   Total Time (minutes) 45   Total # Attendees 8   Group Type expressive therapy   Group Topic Covered balanced lifestyle;structured socialization;self-care activities;relaxation techniques   Group Session Detail Relaxation   Patient Response/Contribution cooperative with task   Patient Participation Detail Cooperatively engaged in Evening Music Relaxation group to decrease anxiety and promote sleep.  Calm affect, appropriately engaged in session, responding well to the music.

## 2023-08-23 NOTE — CARE PLAN
Occupational Therapy Group Note:     08/23/23 1500   Group Therapy Session   Group Attendance attended group session   Time Session Began 1330   Time Session Ended 1430   Total Time (minutes) 60   Total # Attendees 5   Group Type task skill;recreation   Group Topic Covered cognitive activities;leisure exploration/use of leisure time;problem-solving   Group Session Detail visual-spatial group game   Patient Response/Contribution cooperative with task;organized;offered helpful suggestions to peers   Patient Participation Detail Pt actively participated in a structured occupational therapy group with a focus on visuospatial problem solving and social engagement via a group game. Pt demonstrated understanding of the novel 3-step task after an initial explanation. Pt remained focused and engaged throughout the full duration of group. Strategic in her task approach, and politely offered strategic suggestions and appropriate encouragement to peers. Pleasant and engaged. Politely thanked writer for group.

## 2023-08-23 NOTE — PROGRESS NOTES
"Pt participated in dance/movement therapy (D/MT) using creative self-expression in social connection. Group started with self-awareness and reflection then more synchronous and interactive. Pt was able to follow her own physical tension and stretch and open as needed.   Movements were slowing opening, and then both vitalizing and organizing.  Pt stated she loves to \"move with music\" and realized how helpful it was for mood.  At the conclusion of the group, she stated she plans to do this every morning so she can maintain a more balanced and joyful mood throughout her day.     08/23/23 1015   Expressive Therapy   Therapy Type dance/movement   Minutes of Treatment 60       "

## 2023-08-23 NOTE — CARE PLAN
Occupational Therapy Group Note      08/23/23 1339   Group Therapy Session   Group Attendance attended group session   Total Time (minutes) 30   Group Session Detail OT: [facility] for creative expression, promoting autonomy, building sense of self-worth, coping with stress/symptoms and opportunity to exercise cognitive skills (i.e. initiation, planning, organization, sequencing, sustained attention, follow-through, termination of task and overall safety awareness).   Patient Response/Contribution cooperative with task;organized    Pt joined group and was goal directed, social and engaged.  Pt was independent with task.  Mood appeared calm, affect was congruent.

## 2023-08-23 NOTE — PLAN OF CARE
Brief Individual Therapy Note    Checked in with Pt briefly before OT group. Pt shared that she is doing much better and has not had any SI since admitting to hospital. Pt has been connecting with family who have been supportive and has cut back time commitment taking care of parents. Pt reports attending all groups and feels ready to discharge. Pt interested in group therapy outpatient.    Met with Pt for 15 minutes from 1115 to 1130.

## 2023-08-23 NOTE — PLAN OF CARE
Team Note Due:  Friday     Assessment/Intervention/Current Symtoms and Care Coordination:  Chart review and met with team, discussed pt progress, symptomology, and response to treatment.  Discussed the discharge plan and any potential impediments to discharge.  -Writer spoke with pt and she requested a new medication provider. Writer was able to secure a new provider appointment in a few weeks.      Discharge Plan or Goal:  Home with day treatment     Barriers to Discharge:  Suicidal ideation      Referral Status:  Pt has been referred to the 55+ program     Legal Status:  Voluntary      Contacts:  None      Upcoming Meetings and Dates/Important Information and next steps:  AVS complete

## 2023-08-24 VITALS
SYSTOLIC BLOOD PRESSURE: 140 MMHG | WEIGHT: 185 LBS | BODY MASS INDEX: 30.82 KG/M2 | TEMPERATURE: 98.3 F | RESPIRATION RATE: 16 BRPM | OXYGEN SATURATION: 95 % | DIASTOLIC BLOOD PRESSURE: 86 MMHG | HEIGHT: 65 IN | HEART RATE: 88 BPM

## 2023-08-24 PROCEDURE — 250N000013 HC RX MED GY IP 250 OP 250 PS 637

## 2023-08-24 PROCEDURE — 99238 HOSP IP/OBS DSCHRG MGMT 30/<: CPT | Mod: GC | Performed by: PSYCHIATRY & NEUROLOGY

## 2023-08-24 PROCEDURE — 250N000013 HC RX MED GY IP 250 OP 250 PS 637: Performed by: PSYCHIATRY & NEUROLOGY

## 2023-08-24 RX ORDER — PANTOPRAZOLE SODIUM 40 MG/1
40 TABLET, DELAYED RELEASE ORAL
Qty: 30 TABLET | Refills: 0 | Status: SHIPPED | OUTPATIENT
Start: 2023-08-25

## 2023-08-24 RX ORDER — MIRTAZAPINE 7.5 MG/1
7.5 TABLET, FILM COATED ORAL AT BEDTIME
Qty: 30 TABLET | Refills: 0 | Status: SHIPPED | OUTPATIENT
Start: 2023-08-24

## 2023-08-24 RX ORDER — VENLAFAXINE HYDROCHLORIDE 75 MG/1
75 CAPSULE, EXTENDED RELEASE ORAL
Qty: 30 CAPSULE | Refills: 0 | Status: SHIPPED | OUTPATIENT
Start: 2023-08-25

## 2023-08-24 RX ORDER — ATORVASTATIN CALCIUM 20 MG/1
20 TABLET, FILM COATED ORAL DAILY
Qty: 30 TABLET | Refills: 0 | Status: SHIPPED | OUTPATIENT
Start: 2023-08-25

## 2023-08-24 RX ORDER — GABAPENTIN 300 MG/1
300 CAPSULE ORAL 2 TIMES DAILY
Qty: 60 CAPSULE | Refills: 0 | Status: SHIPPED | OUTPATIENT
Start: 2023-08-24

## 2023-08-24 RX ORDER — SALIVA STIMULANT COMB. NO.3
2 SPRAY, NON-AEROSOL (ML) MUCOUS MEMBRANE 4 TIMES DAILY
Qty: 44.3 ML | Refills: 0 | Status: SHIPPED | OUTPATIENT
Start: 2023-08-24

## 2023-08-24 RX ADMIN — CONJUGATED ESTROGENS AND MEDROXYPROGESTERONE ACETATE 1 TABLET: .3; 1.5 TABLET, SUGAR COATED ORAL at 08:20

## 2023-08-24 RX ADMIN — Medication 25 MCG: at 08:19

## 2023-08-24 RX ADMIN — LIDOCAINE PATCH 4% 2 PATCH: 40 PATCH TOPICAL at 08:20

## 2023-08-24 RX ADMIN — VENLAFAXINE HYDROCHLORIDE 75 MG: 75 CAPSULE, EXTENDED RELEASE ORAL at 08:19

## 2023-08-24 RX ADMIN — PANTOPRAZOLE SODIUM 40 MG: 40 TABLET, DELAYED RELEASE ORAL at 08:19

## 2023-08-24 RX ADMIN — GABAPENTIN 300 MG: 300 CAPSULE ORAL at 08:19

## 2023-08-24 RX ADMIN — ATORVASTATIN CALCIUM 20 MG: 20 TABLET, FILM COATED ORAL at 08:19

## 2023-08-24 RX ADMIN — Medication 2 SPRAY: at 08:20

## 2023-08-24 RX ADMIN — Medication 1 CAPSULE: at 08:19

## 2023-08-24 RX ADMIN — AMLODIPINE BESYLATE 5 MG: 5 TABLET ORAL at 08:19

## 2023-08-24 RX ADMIN — LORATADINE 10 MG: 10 TABLET ORAL at 08:19

## 2023-08-24 ASSESSMENT — ACTIVITIES OF DAILY LIVING (ADL)
ADLS_ACUITY_SCORE: 29

## 2023-08-24 NOTE — PLAN OF CARE
Team Note Due:  Friday     Assessment/Intervention/Current Symtoms and Care Coordination:  Chart review and met with team, discussed pt progress, symptomology, and response to treatment.  Discussed the discharge plan and any potential impediments to discharge.  - Denies SI/SIB/HI.     Discharge Plan or Goal:  Home with day treatment     Barriers to Discharge:  None     Referral Status:  Pt has been referred to the 55+ program     Legal Status:  Voluntary      Contacts:  None      Upcoming Meetings and Dates/Important Information and next steps:  AVS complete

## 2023-08-24 NOTE — PLAN OF CARE
"Goal Outcome Evaluation:    Plan of Care Reviewed With: patient      Problem: Adult Behavioral Health Plan of Care  Goal: Plan of Care Review  8/24/2023 1040 by Lynn Wells, RN  Outcome: Adequate for Care Transition  8/24/2023 0949 by Lynn Wells, RN  Outcome: Progressing  Flowsheets (Taken 8/24/2023 0900)  Patient Agreement with Plan of Care: agrees  Goal: Patient-Specific Goal (Individualization)  Description: You can add care plan individualizations to a care plan. Examples of Individualization might be:  \"Parent requests to be called daily at 9am for status\", \"I have a hard time hearing out of my right ear\", or \"Do not touch me to wake me up as it startles me\".  Outcome: Adequate for Care Transition  Goal: Individualized Daily Interaction Plan (IDIP)  Outcome: Adequate for Care Transition  Goal: Adheres to Safety Considerations for Self and Others  Outcome: Adequate for Care Transition  Goal: Absence of New-Onset Illness or Injury  Outcome: Adequate for Care Transition  Goal: Optimized Coping Skills in Response to Life Stressors  Outcome: Adequate for Care Transition  Goal: Develops/Participates in Therapeutic [place] to Support Successful Transition  Outcome: Adequate for Care Transition     Problem: Sleep Disturbance  Goal: Adequate Sleep/Rest  Outcome: Adequate for Care Transition     Problem: Anxiety  Goal: Anxiety Reduction or Resolution  Outcome: Adequate for Care Transition     Problem: Depression  Goal: Improved Mood  Outcome: Adequate for Care Transition     Problem: Pain Acute  Goal: Optimal Pain Control and Function  Outcome: Adequate for Care Transition     Problem: Suicidal Behavior  Goal: Suicidal Behavior is Absent or Managed  Outcome: Adequate for Care Transition     Problem: Psychotic Signs/Symptoms  Goal: Improved Behavioral Control (Psychotic Signs/Symptoms)  Outcome: Adequate for Care Transition  Goal: Optimal Cognitive Function (Psychotic Signs/Symptoms)  Outcome: Adequate for " Care Transition  Goal: Increased Participation and Engagement (Psychotic Signs/Symptoms)  Outcome: Adequate for Care Transition  Goal: Improved Mood Symptoms  Outcome: Adequate for Care Transition  Goal: Improved Psychomotor Symptoms (Psychotic Signs/Symptoms)  Outcome: Adequate for Care Transition  Goal: Decreased Sensory Symptoms (Psychotic Signs/Symptoms)  Outcome: Adequate for Care Transition  Goal: Improved Sleep (Psychotic Signs/Symptoms)  Outcome: Adequate for Care Transition  Goal: Enhanced Social, Occupational or Functional Skills (Psychotic Signs/Symptoms)  Outcome: Adequate for Care Transition

## 2023-08-24 NOTE — PROGRESS NOTES
Behavioral Health  Note   Behavioral Health  Spirituality Group Note     Unit [facility]    Name: Nancy Montemayor    YOB: 1960   MRN: [id number]    Age: 62 year old     Patient attended -led group, which included discussion of spirituality, coping with illness and building resilience.   Patient attended group for [facility] - spirituality groups are not billed.   The patient actively participated in group discussion     [facility]  Staff    Page [phone]

## 2023-08-24 NOTE — PLAN OF CARE
Goal Outcome Evaluation:    Plan of Care Reviewed With: patient      Problem: Adult Behavioral Health Plan of Care  Goal: Plan of Care Review  Outcome: Progressing  Flowsheets (Taken 8/24/2023 0900)  Patient Agreement with Plan of Care: agrees  Pt was visible on the unit. Was calm and cooperative, denied pain or discomfort. Pt stated that she slept well. She denied SI, HI and she contracted for safety. She was medication compliant and she denied side effects.   Pt is discharging today and she is looking forward to it. Discharge instructions were given to patient. All personal belongings and discharge meds al;so given to pt. She was escorted to her ride by staff. She was verbalized understanding of the plan of care after discharge.

## 2023-08-24 NOTE — PLAN OF CARE
Problem: Adult Behavioral Health Plan of Care  Goal: Plan of Care Review  Outcome: Progressing  Flowsheets (Taken 8/23/2023 1635)  Patient Agreement with Plan of Care: agrees   Goal Outcome Evaluation:    Plan of Care Reviewed With: patient          Pt was sleeping beginning of the shift and was up at about 1700. Pt spent the rest of the shift out in the lounge watching TV with peers. Her affect was flat but pleasant on approach. She socialized and interacted with peers. She was out for dinner and snacks and had good food and fluids intake. Vitals stable with b/p 140/86. Pt was asymptomatic. Pt denied pain and all psych symptoms. Contracted for safety. She was cooperative and med compliant. Prn Melatonin 1 mg was given for sleep. Pt attended OT evening group and said it went well. No other concern or outburst behavior noted this shift. Will continue to monitor and will assist if need arise.

## 2023-08-24 NOTE — PROGRESS NOTES
08/23/23 2100   Group Therapy Session   Group Attendance attended group session   Time Session Began 2000   Time Session Ended 2055   Total Time (minutes) 50   Total # Attendees 9   Group Type recreation   Group Topic Covered relaxation techniques   Group Session Detail stress reduction   Patient Response/Contribution cooperative with task   Patient Participation Detail Pt actively participated in a structured Therapeutic Recreation group with a focus on leisure participation, socializing, and exercise. Pt participated in the guided exercise for the full duration of the group. Pt followed along, engaged in the guided chair exercise routine and added to the discussion prompts throughout the routine.  Pt was encouraged to use positive imagery with the deep breathing and stretching to foster relaxation, improves focus, and reduce stress.

## 2023-08-24 NOTE — PLAN OF CARE
Problem: Sleep Disturbance  Goal: Adequate Sleep/Rest  Outcome: Progressing     Pt appeared to slept 7 hours based on Q 15 mins rounds with regular unlabored respirations. No PRN medication was requested or given in this shift.

## 2023-08-25 ENCOUNTER — PATIENT OUTREACH (OUTPATIENT)
Dept: CARE COORDINATION | Facility: CLINIC | Age: 63
End: 2023-08-25
Payer: COMMERCIAL
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
## 2023-08-29 ENCOUNTER — TELEPHONE (OUTPATIENT)
Dept: BEHAVIORAL HEALTH | Facility: CLINIC | Age: 63
End: 2023-08-29
Payer: COMMERCIAL

## 2023-09-15 ENCOUNTER — TELEPHONE (OUTPATIENT)
Dept: BEHAVIORAL HEALTH | Facility: CLINIC | Age: 63
End: 2023-09-15
Payer: COMMERCIAL

## 2023-09-15 NOTE — TELEPHONE ENCOUNTER
"Writer contacted patient regarding 55+ program, patient reported she \"doesn't need that much therapy\" and isn't interested at this time.    Kristie Aguilera [facility] on 9/15/2023 at 1:29 PM    "

## 2023-12-18 NOTE — PLAN OF CARE
Care Coordinator scheduled the following appointment:     Primary Care appointment: Thursday, August 31, 2023 @ 1:30pm In-person  Provider: Dr. Anisha Cosme  Location: [facility]  [address] [facility], [place] [address]  Phone: [phone]  Fax: [phone]    CC updated CTC and AVS    Madhavi Medina  Care Coordinator  [email or web address]  [phone]    oral

## 2024-05-02 NOTE — PLAN OF CARE
ADMISSION to [address] [place] [address] UNIT:    Nancy Montemayor was admitted from [facility] ED for suicide attempt with intentional overdose at 2000.   2 RN skin assessment: completed  Result of skin assessment and interventions/actions: None at this time  Height, weight: completed? Yes  Patient belongings & admission documents: see Flowsheets, completed? Yes  MDRO education added to care plan: N/A    Pt is Aox4 with some intermittent confusion. Up SBA in room with GB and walker. Pt having moderate tremors and exhibiting unbalanced gait when moving around. PRN Ativan given x1 for tremors. Denies pain, n/v, and sob. Reports tingling bilaterally in hands. Tele in place showing NSR. 1:1 at bedside for safety and suicide precautions. 72 hour hold in place until 8/18 1434. Plan to touch base again with poison control in AM.   
"  VS: BP (!) 140/105 (BP Location: Left arm)   Pulse 96   Temp 98.7  F (37.1  C) (Oral)   Resp 16   Ht 1.575 m (5' 2\")   Wt 83.7 kg (184 lb 9.6 oz)   SpO2 95%   BMI 33.76 kg/m       O2: 95% on RA   Output: Pt is continent of B&B. Pt voids spontaneously and without issue in bathroom.   Last BM: 08/18/23   Activity: Independent   Skin: No known issues   Pain: Denies   CMS: AOX4 Denies pain, CP, SOB, numbness, tingling.   Dressing: N/A   Diet: Regular diet, thin liquids, takes pills whole with water,   Plan: [facility]. Pt wound up transferring to [address] in the [facility]. Pt left with flowers in vase and suitcase of personal belongings.                          "
A&Ox4, calm and cooperative, able to make needs known with call light in reach.  Independent in room.  VSS, on room air, voids spontaneously, LBM 8/18.  Denied SOB, chest pain, numbness/tingling, nausea/vomiting, pain.  Pt denied suicidal ideation at this time.  No observed skin concerns this shift.  No IV access.  RN managed potassium.  Sleep and hydration promoted.  Continue POC.  
Goal Outcome Evaluation:      Plan of Care Reviewed With: patient    Overall Patient Progress: improvingOverall Patient Progress: improving         Pt alert and oriented x4, denied pain, no IV access. Denied SI. Pt looking forward to [facility] placement, wants to get better from her depression. Has a sister who will clean up area where she had planned to commit suicide. Eating and drinking well. No tremors noted this shift. Pt started gabapentin for anxiety. Able to make needs known, continue POC  
Goal Outcome Evaluation:      Plan of Care Reviewed With: patient    Overall Patient Progress: improvingOverall Patient Progress: improving       Pt alert and oriented x4, denied pain, eating well. Tele discontinued. Pt showered today. She denied SI. Looks forward to going to [facility] when a bed opens. Has tremors and slightly anxious this evening, gave 1 mg ativan with tolerable results. Up independently in room but calls for assist as advised when tremors act up. Pt uses call light appropriately. Voluntary status. Continue POC    
Nancy URIBE Albina  August 15, 2023  Plan of Care Hand-off Note     Patient Care Path: medical    Plan for Care:   Patient identifies stressors including fibromyalgia, caretaking for her parents, unemployment and financial strains, and loneliness. Patient stockpiled her medication over the past 2 weeks and cancelled her outpatient appointments. Patient wrote suicide notes, made arrangements for her belongings, and ingested all of her psychiatric medication combined with NyQuil and alcohol. Patient planned to do this on a Tuesday when her  would be there to find her . Patient is receiving medical workup and will likely require medical admission. Patient would otherwise benefit from inpatient mental health admission. Patient expressed understanding.    Identified Goals and Safety Issues:  recent ingestion    Overview:  Kavita Wooten (Sister) [phone]            Legal Status: Legal Status at Admission: Voluntary/Patient has signed consent for treatment    Psychiatry Consult: ordered       Updated  MD regarding plan of care.           RAMIREZ Luis       
Call bell/Fall precautions

## 2024-06-16 ENCOUNTER — HEALTH MAINTENANCE LETTER (OUTPATIENT)
Age: 64
End: 2024-06-16

## 2025-04-19 ENCOUNTER — HEALTH MAINTENANCE LETTER (OUTPATIENT)
Age: 65
End: 2025-04-19

## 2025-06-21 ENCOUNTER — HEALTH MAINTENANCE LETTER (OUTPATIENT)
Age: 65
End: 2025-06-21